# Patient Record
Sex: MALE | Race: WHITE | NOT HISPANIC OR LATINO | URBAN - METROPOLITAN AREA
[De-identification: names, ages, dates, MRNs, and addresses within clinical notes are randomized per-mention and may not be internally consistent; named-entity substitution may affect disease eponyms.]

---

## 2017-06-10 ENCOUNTER — EMERGENCY (EMERGENCY)
Facility: HOSPITAL | Age: 72
LOS: 1 days | Discharge: HOME | End: 2017-06-10
Attending: EMERGENCY MEDICINE | Admitting: EMERGENCY MEDICINE
Payer: MEDICARE

## 2017-06-10 VITALS
SYSTOLIC BLOOD PRESSURE: 149 MMHG | TEMPERATURE: 97 F | DIASTOLIC BLOOD PRESSURE: 91 MMHG | HEART RATE: 94 BPM | RESPIRATION RATE: 19 BRPM

## 2017-06-10 VITALS
HEART RATE: 85 BPM | TEMPERATURE: 97 F | SYSTOLIC BLOOD PRESSURE: 157 MMHG | OXYGEN SATURATION: 100 % | RESPIRATION RATE: 18 BRPM | DIASTOLIC BLOOD PRESSURE: 101 MMHG

## 2017-06-10 DIAGNOSIS — F39 UNSPECIFIED MOOD [AFFECTIVE] DISORDER: ICD-10-CM

## 2017-06-10 DIAGNOSIS — F32.9 MAJOR DEPRESSIVE DISORDER, SINGLE EPISODE, UNSPECIFIED: ICD-10-CM

## 2017-06-10 DIAGNOSIS — G47.00 INSOMNIA, UNSPECIFIED: ICD-10-CM

## 2017-06-10 DIAGNOSIS — F60.9 PERSONALITY DISORDER, UNSPECIFIED: ICD-10-CM

## 2017-06-10 DIAGNOSIS — F41.9 ANXIETY DISORDER, UNSPECIFIED: ICD-10-CM

## 2017-06-10 DIAGNOSIS — Z79.899 OTHER LONG TERM (CURRENT) DRUG THERAPY: ICD-10-CM

## 2017-06-10 DIAGNOSIS — Z91.048 OTHER NONMEDICINAL SUBSTANCE ALLERGY STATUS: ICD-10-CM

## 2017-06-10 LAB
ALBUMIN SERPL ELPH-MCNC: 4.5 G/DL — SIGNIFICANT CHANGE UP (ref 3.3–5)
ALP SERPL-CCNC: 67 U/L — SIGNIFICANT CHANGE UP (ref 40–120)
ALT FLD-CCNC: 22 U/L RC — SIGNIFICANT CHANGE UP (ref 10–45)
ANION GAP SERPL CALC-SCNC: 14 MMOL/L — SIGNIFICANT CHANGE UP (ref 5–17)
APAP SERPL-MCNC: <15 UG/ML — SIGNIFICANT CHANGE UP (ref 10–30)
AST SERPL-CCNC: 17 U/L — SIGNIFICANT CHANGE UP (ref 10–40)
BASOPHILS # BLD AUTO: 0 K/UL — SIGNIFICANT CHANGE UP (ref 0–0.2)
BASOPHILS NFR BLD AUTO: 0.2 % — SIGNIFICANT CHANGE UP (ref 0–2)
BILIRUB SERPL-MCNC: 1.4 MG/DL — HIGH (ref 0.2–1.2)
BUN SERPL-MCNC: 20 MG/DL — SIGNIFICANT CHANGE UP (ref 7–23)
CALCIUM SERPL-MCNC: 10.2 MG/DL — SIGNIFICANT CHANGE UP (ref 8.4–10.5)
CHLORIDE SERPL-SCNC: 103 MMOL/L — SIGNIFICANT CHANGE UP (ref 96–108)
CO2 SERPL-SCNC: 22 MMOL/L — SIGNIFICANT CHANGE UP (ref 22–31)
CREAT SERPL-MCNC: 1.08 MG/DL — SIGNIFICANT CHANGE UP (ref 0.5–1.3)
EOSINOPHIL # BLD AUTO: 0 K/UL — SIGNIFICANT CHANGE UP (ref 0–0.5)
EOSINOPHIL NFR BLD AUTO: 0.5 % — SIGNIFICANT CHANGE UP (ref 0–6)
GLUCOSE SERPL-MCNC: 85 MG/DL — SIGNIFICANT CHANGE UP (ref 70–99)
HCT VFR BLD CALC: 45.1 % — SIGNIFICANT CHANGE UP (ref 39–50)
HGB BLD-MCNC: 15.8 G/DL — SIGNIFICANT CHANGE UP (ref 13–17)
LYMPHOCYTES # BLD AUTO: 1.3 K/UL — SIGNIFICANT CHANGE UP (ref 1–3.3)
LYMPHOCYTES # BLD AUTO: 14.6 % — SIGNIFICANT CHANGE UP (ref 13–44)
MCHC RBC-ENTMCNC: 35 GM/DL — SIGNIFICANT CHANGE UP (ref 32–36)
MCHC RBC-ENTMCNC: 35.2 PG — HIGH (ref 27–34)
MCV RBC AUTO: 100 FL — SIGNIFICANT CHANGE UP (ref 80–100)
MONOCYTES # BLD AUTO: 0.7 K/UL — SIGNIFICANT CHANGE UP (ref 0–0.9)
MONOCYTES NFR BLD AUTO: 7.5 % — SIGNIFICANT CHANGE UP (ref 2–14)
NEUTROPHILS # BLD AUTO: 6.8 K/UL — SIGNIFICANT CHANGE UP (ref 1.8–7.4)
NEUTROPHILS NFR BLD AUTO: 77.2 % — HIGH (ref 43–77)
PLATELET # BLD AUTO: 267 K/UL — SIGNIFICANT CHANGE UP (ref 150–400)
POTASSIUM SERPL-MCNC: 4.1 MMOL/L — SIGNIFICANT CHANGE UP (ref 3.5–5.3)
POTASSIUM SERPL-SCNC: 4.1 MMOL/L — SIGNIFICANT CHANGE UP (ref 3.5–5.3)
PROT SERPL-MCNC: 7.7 G/DL — SIGNIFICANT CHANGE UP (ref 6–8.3)
RBC # BLD: 4.49 M/UL — SIGNIFICANT CHANGE UP (ref 4.2–5.8)
RBC # FLD: 12 % — SIGNIFICANT CHANGE UP (ref 10.3–14.5)
SALICYLATES SERPL-MCNC: <2 MG/DL — LOW (ref 15–30)
SODIUM SERPL-SCNC: 139 MMOL/L — SIGNIFICANT CHANGE UP (ref 135–145)
TSH SERPL-MCNC: 2.28 UIU/ML — SIGNIFICANT CHANGE UP (ref 0.27–4.2)
WBC # BLD: 8.8 K/UL — SIGNIFICANT CHANGE UP (ref 3.8–10.5)
WBC # FLD AUTO: 8.8 K/UL — SIGNIFICANT CHANGE UP (ref 3.8–10.5)

## 2017-06-10 PROCEDURE — 90792 PSYCH DIAG EVAL W/MED SRVCS: CPT

## 2017-06-10 PROCEDURE — 84443 ASSAY THYROID STIM HORMONE: CPT

## 2017-06-10 PROCEDURE — 99284 EMERGENCY DEPT VISIT MOD MDM: CPT | Mod: 25

## 2017-06-10 PROCEDURE — 93005 ELECTROCARDIOGRAM TRACING: CPT

## 2017-06-10 PROCEDURE — 80053 COMPREHEN METABOLIC PANEL: CPT

## 2017-06-10 PROCEDURE — 93010 ELECTROCARDIOGRAM REPORT: CPT

## 2017-06-10 PROCEDURE — 85027 COMPLETE CBC AUTOMATED: CPT

## 2017-06-10 PROCEDURE — 99284 EMERGENCY DEPT VISIT MOD MDM: CPT | Mod: 25,GC

## 2017-06-10 PROCEDURE — 80307 DRUG TEST PRSMV CHEM ANLYZR: CPT

## 2017-06-10 RX ADMIN — Medication 1 MILLIGRAM(S): at 14:48

## 2017-06-10 NOTE — ED PROVIDER NOTE - PHYSICAL EXAMINATION
Well Appearing, Nontoxic, NAD;  Symm Facies, PERRL 3mm, (-)Pallor, Anicteric, MMM;  No JVD/Bruits or stridor;  RRR w/o m/g/r;   CTAB w/o w/r/r;   Abd soft, nt/nd, +bs;  No CVAT;  No edema/calf tender;  No rash;  AOX3, Normal speech, normal strength/sensation/gait;  anxious/crying

## 2017-06-10 NOTE — ED BEHAVIORAL HEALTH ASSESSMENT NOTE - DESCRIPTION
prostate CA, melanoma lives alone, works from home, single and no kids slightly restless, offered ativan prn for anxiety

## 2017-06-10 NOTE — ED BEHAVIORAL HEALTH ASSESSMENT NOTE - DIFFERENTIAL
Mood disorder, Bipolar disorder, personality disorder with dependent, borderline and narcissistic traits

## 2017-06-10 NOTE — ED BEHAVIORAL HEALTH ASSESSMENT NOTE - OTHER PAST PSYCHIATRIC HISTORY (INCLUDE DETAILS REGARDING ONSET, COURSE OF ILLNESS, INPATIENT/OUTPATIENT TREATMENT)
multiple psych admissions, SA by OD 5 years ago, last admitted [facility] 1/17, received ect, was d/c on zyprexa, ativan, depakote

## 2017-06-10 NOTE — ED ADULT NURSE NOTE - OBJECTIVE STATEMENT
This pt is a 70 y/o male with history of psychiatric hospitalizations. Pt  complained that he is having a mental breakdown and denied feeling depressed, suicidal or homicidal. Pt was begging to be treated but was asking to leave at the same time. Pt stated he has not been seeing his therapist because "that last woman was crazy"(referring to therapist), Pt was loud and anxious upon his arrival to the ED.  Pt is currently on 1:1 for safety

## 2017-06-10 NOTE — ED PROVIDER NOTE - PLAN OF CARE
Follow up with PMD in 1-2 days.  Use tylenol as needed for pain. Tylenol 1000mg every 8 hours.    Read all handouts provided. Return to ED if you have worsening pain or further concerns.

## 2017-06-10 NOTE — ED BEHAVIORAL HEALTH ASSESSMENT NOTE - HPI (INCLUDE ILLNESS QUALITY, SEVERITY, DURATION, TIMING, CONTEXT, MODIFYING FACTORS, ASSOCIATED SIGNS AND SYMPTOMS)
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  Pt anxious during interview, yelling at times, perseverates on not being able to function, unable to give clear examples of how he is not able to function. Pt denies feeling depressed, no anhdonia, fair ADLs, fair sleep, poor appetite, no si/hi, no access to guns. Pt denies anxiety, panic attacks, no psychosis, bree. Pt is not in current psych tx, not on psych meds. Pt states he has difficult time focusing doing work from home, couldn't elaborate details. Pt demanding a pill to "take it all away". Pt repeatedly stated he has been "suffering from mental illness for decades", believes no medication will help him. 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  Pt anxious during interview, yelling at times, perseverates on not being able to function, unable to give clear examples of how he is not able to function. Pt denies feeling depressed, no anhdonia, fair ADLs, fair sleep, poor appetite, no si/hi, no access to guns. Pt denies anxiety, panic attacks, no psychosis, bree. Pt is not in current psych tx, not on psych meds. Pt states he has difficult time focusing doing work from home, couldn't elaborate details. Pt demanding a pill to "take it all away". Pt repeatedly stated he has been "suffering from mental illness for decades", believes no medication will help him. Pt states he doesn't want to be admitted to psych, doesn't feel its necessary. Pt was planning to call a psychiatrist on Monday and make an appointment.

## 2017-06-10 NOTE — ED BEHAVIORAL HEALTH ASSESSMENT NOTE - RISK ASSESSMENT
Risk factors: impulsivity, noncompliance, h/o SA, h/o multiple admissions, poor social supports, poor therapeutic relationship     Protective factors: no guns, no current SI/HI    Pt is at an acutely elevated risk and requires admission

## 2017-06-10 NOTE — ED PROVIDER NOTE - ATTENDING CONTRIBUTION TO CARE
------------ATTENDING NOTE------------   72 yo M c/o increasing depressed mood, describing "nothing matters anymore" and "no one can help", crying in room, denies and headache or confusion or pain, no new medications/changes, no recent trauma, no drug or intoxicant use, awaiting labs/imaging and d/w Psych -->  - Manjinder King MD   ------------------------------------------------------------------------------------------- ------------ATTENDING NOTE------------   70 yo M c/o increasing depressed mood, describing "nothing matters anymore" and "no one can help", crying in room, denies and headache or confusion or pain, no new medications/changes, no recent trauma, no drug or intoxicant use, awaiting labs/imaging and d/w Psych --> evaluated by Psych and very well known to them, he is at his baseline, pt asking only for dose oral benzo and now feels safe/appropriate for d/c, cleared by albert and they feel he is himself for d/c, pt understands ddx, tx, colin, alexia.  - Manjinder King MD   -------------------------------------------------------------------------------------------

## 2017-06-10 NOTE — ED PROVIDER NOTE - CARE PLAN
Principal Discharge DX:	Depression  Secondary Diagnosis:	Melancholia Principal Discharge DX:	Depression  Instructions for follow-up, activity and diet:	Follow up with PMD in 1-2 days.  Use tylenol as needed for pain. Tylenol 1000mg every 8 hours.    Read all handouts provided. Return to ED if you have worsening pain or further concerns.  Secondary Diagnosis:	Melancholia

## 2017-06-10 NOTE — ED BEHAVIORAL HEALTH ASSESSMENT NOTE - SUMMARY
71M single, domiciled, retired with PPhx Bipolar disorder, personality disorder with dependent and narcissistic traits, multiple ED visits and psychiatric admissions in the past, last one in July 2016, 1 SA by OD 5 years ago, noncompliant with outpatient care, no h/o legal/arrests/violence, no active substance abuse, no PMH, self-referred to ER c/o inability to function, pt had fair ADLS, denies mood symptoms, no si/h, appears anxious, med seeking, attention seeking behavior, received ativan in ER, 71M single, domiciled, retired with PPhx Bipolar disorder, personality disorder with dependent and narcissistic traits, multiple ED visits and psychiatric admissions in the past, last one in July 2016, 1 SA by OD 5 years ago, noncompliant with outpatient care, no h/o legal/arrests/violence, no active substance abuse, no PMH, self-referred to ER c/o inability to function, pt had fair ADLS, denies mood symptoms, no si/h, appears anxious, med seeking, attention seeking behavior, received ativan in ER, reported feeling some relief after Ativan, less anxious, wants to be d/c home with outpt f/u, pt doesn't warrant psych admission.

## 2017-06-11 ENCOUNTER — EMERGENCY (EMERGENCY)
Facility: HOSPITAL | Age: 72
LOS: 1 days | Discharge: HOME | End: 2017-06-11
Attending: EMERGENCY MEDICINE | Admitting: EMERGENCY MEDICINE
Payer: MEDICARE

## 2017-06-11 VITALS
RESPIRATION RATE: 19 BRPM | DIASTOLIC BLOOD PRESSURE: 88 MMHG | TEMPERATURE: 98 F | OXYGEN SATURATION: 99 % | HEART RATE: 82 BPM | SYSTOLIC BLOOD PRESSURE: 143 MMHG

## 2017-06-11 DIAGNOSIS — Z91.048 OTHER NONMEDICINAL SUBSTANCE ALLERGY STATUS: ICD-10-CM

## 2017-06-11 DIAGNOSIS — F41.9 ANXIETY DISORDER, UNSPECIFIED: ICD-10-CM

## 2017-06-11 DIAGNOSIS — R45.1 RESTLESSNESS AND AGITATION: ICD-10-CM

## 2017-06-11 DIAGNOSIS — Z79.899 OTHER LONG TERM (CURRENT) DRUG THERAPY: ICD-10-CM

## 2017-06-11 LAB
ALBUMIN SERPL ELPH-MCNC: 4.2 G/DL — SIGNIFICANT CHANGE UP (ref 3.3–5)
ALP SERPL-CCNC: 61 U/L — SIGNIFICANT CHANGE UP (ref 40–120)
ALT FLD-CCNC: 22 U/L RC — SIGNIFICANT CHANGE UP (ref 10–45)
ANION GAP SERPL CALC-SCNC: 14 MMOL/L — SIGNIFICANT CHANGE UP (ref 5–17)
APAP SERPL-MCNC: <15 UG/ML — SIGNIFICANT CHANGE UP (ref 10–30)
APPEARANCE UR: CLEAR — SIGNIFICANT CHANGE UP
AST SERPL-CCNC: 19 U/L — SIGNIFICANT CHANGE UP (ref 10–40)
BASOPHILS # BLD AUTO: 0.1 K/UL — SIGNIFICANT CHANGE UP (ref 0–0.2)
BASOPHILS NFR BLD AUTO: 0.9 % — SIGNIFICANT CHANGE UP (ref 0–2)
BILIRUB SERPL-MCNC: 1.1 MG/DL — SIGNIFICANT CHANGE UP (ref 0.2–1.2)
BILIRUB UR-MCNC: NEGATIVE — SIGNIFICANT CHANGE UP
BUN SERPL-MCNC: 22 MG/DL — SIGNIFICANT CHANGE UP (ref 7–23)
CALCIUM SERPL-MCNC: 9.8 MG/DL — SIGNIFICANT CHANGE UP (ref 8.4–10.5)
CHLORIDE SERPL-SCNC: 107 MMOL/L — SIGNIFICANT CHANGE UP (ref 96–108)
CO2 SERPL-SCNC: 23 MMOL/L — SIGNIFICANT CHANGE UP (ref 22–31)
COLOR SPEC: YELLOW — SIGNIFICANT CHANGE UP
CREAT SERPL-MCNC: 1.14 MG/DL — SIGNIFICANT CHANGE UP (ref 0.5–1.3)
DIFF PNL FLD: NEGATIVE — SIGNIFICANT CHANGE UP
EOSINOPHIL # BLD AUTO: 0.1 K/UL — SIGNIFICANT CHANGE UP (ref 0–0.5)
EOSINOPHIL NFR BLD AUTO: 1.2 % — SIGNIFICANT CHANGE UP (ref 0–6)
ETHANOL SERPL-MCNC: SIGNIFICANT CHANGE UP MG/DL (ref 0–10)
GLUCOSE SERPL-MCNC: 83 MG/DL — SIGNIFICANT CHANGE UP (ref 70–99)
GLUCOSE UR QL: NEGATIVE — SIGNIFICANT CHANGE UP
HCT VFR BLD CALC: 42.6 % — SIGNIFICANT CHANGE UP (ref 39–50)
HGB BLD-MCNC: 14.8 G/DL — SIGNIFICANT CHANGE UP (ref 13–17)
KETONES UR-MCNC: NEGATIVE — SIGNIFICANT CHANGE UP
LEUKOCYTE ESTERASE UR-ACNC: NEGATIVE — SIGNIFICANT CHANGE UP
LYMPHOCYTES # BLD AUTO: 1.8 K/UL — SIGNIFICANT CHANGE UP (ref 1–3.3)
LYMPHOCYTES # BLD AUTO: 20.3 % — SIGNIFICANT CHANGE UP (ref 13–44)
MCHC RBC-ENTMCNC: 34.7 GM/DL — SIGNIFICANT CHANGE UP (ref 32–36)
MCHC RBC-ENTMCNC: 35.1 PG — HIGH (ref 27–34)
MCV RBC AUTO: 101 FL — HIGH (ref 80–100)
MONOCYTES # BLD AUTO: 0.9 K/UL — SIGNIFICANT CHANGE UP (ref 0–0.9)
MONOCYTES NFR BLD AUTO: 9.8 % — SIGNIFICANT CHANGE UP (ref 2–14)
NEUTROPHILS # BLD AUTO: 6.2 K/UL — SIGNIFICANT CHANGE UP (ref 1.8–7.4)
NEUTROPHILS NFR BLD AUTO: 67.8 % — SIGNIFICANT CHANGE UP (ref 43–77)
NITRITE UR-MCNC: NEGATIVE — SIGNIFICANT CHANGE UP
PCP SPEC-MCNC: SIGNIFICANT CHANGE UP
PH UR: 6.5 — SIGNIFICANT CHANGE UP (ref 5–8)
PLATELET # BLD AUTO: 236 K/UL — SIGNIFICANT CHANGE UP (ref 150–400)
POTASSIUM SERPL-MCNC: 4.1 MMOL/L — SIGNIFICANT CHANGE UP (ref 3.5–5.3)
POTASSIUM SERPL-SCNC: 4.1 MMOL/L — SIGNIFICANT CHANGE UP (ref 3.5–5.3)
PROT SERPL-MCNC: 7.3 G/DL — SIGNIFICANT CHANGE UP (ref 6–8.3)
PROT UR-MCNC: SIGNIFICANT CHANGE UP
RBC # BLD: 4.22 M/UL — SIGNIFICANT CHANGE UP (ref 4.2–5.8)
RBC # FLD: 12.1 % — SIGNIFICANT CHANGE UP (ref 10.3–14.5)
SALICYLATES SERPL-MCNC: <2 MG/DL — LOW (ref 15–30)
SODIUM SERPL-SCNC: 144 MMOL/L — SIGNIFICANT CHANGE UP (ref 135–145)
SP GR SPEC: 1.03 — HIGH (ref 1.01–1.02)
TSH SERPL-MCNC: 2.57 UIU/ML — SIGNIFICANT CHANGE UP (ref 0.27–4.2)
UROBILINOGEN FLD QL: NEGATIVE — SIGNIFICANT CHANGE UP
WBC # BLD: 9.1 K/UL — SIGNIFICANT CHANGE UP (ref 3.8–10.5)
WBC # FLD AUTO: 9.1 K/UL — SIGNIFICANT CHANGE UP (ref 3.8–10.5)

## 2017-06-11 PROCEDURE — 99284 EMERGENCY DEPT VISIT MOD MDM: CPT | Mod: 25,GC

## 2017-06-11 PROCEDURE — 93010 ELECTROCARDIOGRAM REPORT: CPT

## 2017-06-11 PROCEDURE — 99285 EMERGENCY DEPT VISIT HI MDM: CPT

## 2017-06-11 RX ORDER — FOLIC ACID 0.8 MG
1 TABLET ORAL ONCE
Qty: 0 | Refills: 0 | Status: COMPLETED | OUTPATIENT
Start: 2017-06-11 | End: 2017-06-11

## 2017-06-11 RX ORDER — HYDROXYZINE HCL 10 MG
50 TABLET ORAL ONCE
Qty: 0 | Refills: 0 | Status: COMPLETED | OUTPATIENT
Start: 2017-06-11 | End: 2017-06-11

## 2017-06-11 RX ORDER — THIAMINE MONONITRATE (VIT B1) 100 MG
100 TABLET ORAL ONCE
Qty: 0 | Refills: 0 | Status: COMPLETED | OUTPATIENT
Start: 2017-06-11 | End: 2017-06-11

## 2017-06-11 RX ADMIN — Medication 50 MILLIGRAM(S): at 21:04

## 2017-06-11 RX ADMIN — Medication 2 MILLIGRAM(S): at 11:58

## 2017-06-11 NOTE — ED PROVIDER NOTE - OBJECTIVE STATEMENT
71 year old male female chart review noted to have dx of bipolar, pt states "I cannot function" but cannot describe how. States that he wants to go to [facility] and get ECT. Denies si / hi. Is able to perform ADLs but states "I want to be able to go to work" and states that he works with [employer] and [employer]. Was attempting to drive to [facility] but came here instead, seen yesterday but declined admission at that time. no cough / sneeze / urinary symptoms. states he feels well. Had been planning to see psych outpt on monday as discussed yesterday. denies alcohol use.

## 2017-06-11 NOTE — ED PROVIDER NOTE - PLAN OF CARE
1. Take Ativan as prescribed for anxiety  2. Please follow-up with psychiatry within the next 2-3 days, please refer to numbers given to you by psychiatry doctor  3. Return immediately for any worsening or concerning symptoms including any thoughts of wanting to hurt yourself

## 2017-06-11 NOTE — ED BEHAVIORAL HEALTH ASSESSMENT NOTE - OTHER PAST PSYCHIATRIC HISTORY (INCLUDE DETAILS REGARDING ONSET, COURSE OF ILLNESS, INPATIENT/OUTPATIENT TREATMENT)
multiple psych admissions, SA by OD 5 years ago, last admitted [facility] 1/17, received ect, was d/c on zyprexa, ativan, depakote

## 2017-06-11 NOTE — ED ADULT NURSE NOTE - CHPI ED SYMPTOMS NEG
no change in level of consciousness/no disorientation/no hallucinations/no homicidal/no confusion/no suicidal

## 2017-06-11 NOTE — ED PROVIDER NOTE - ATTENDING CONTRIBUTION TO CARE
------------ATTENDING NOTE------------   70 yo M returning to ED c/o increased anxiety and feeling unwell, evaluated for same yesterday,   - Manjinder King MD   ----------------------------------------------------- ------------ATTENDING NOTE------------   72 yo M returning to ED c/o increased anxiety and feeling unwell, evaluated for same yesterday, eval by Psych, transfer for inpt psych, no new/additional complaints/concerns.  - Manjinder King MD   -----------------------------------------------------

## 2017-06-11 NOTE — ED PROVIDER NOTE - NS ED ROS FT
ROS: GENERAL: no fevers, no chills EYE: no visual changes ENT: no epistaxis,  no throat pain CHEST: no pain with breathing,  no hemoptysis CARDIAC: no chest pain, no upper back pain GI: no abdominal pain, no hematemesis, no bright red blood per rectum : no dysuria, no hematuria MSK: no arm pain, no leg pain, no back pain SKIN: no rash NEURO: no headache, no neck pain HEME: no easy bruising, no easy bleeding -jesus

## 2017-06-11 NOTE — ED BEHAVIORAL HEALTH ASSESSMENT NOTE - HPI (INCLUDE ILLNESS QUALITY, SEVERITY, DURATION, TIMING, CONTEXT, MODIFYING FACTORS, ASSOCIATED SIGNS AND SYMPTOMS)
71M single, domiciled, retired with PPhx Bipolar disorder, personality disorder with dependent and narcissistic traits, multiple ED visits and psychiatric admissions in the past, last one in 1/17 SA rain by STARR 5 years ago, noncompliant with outpatient care, no h/o legal/arrests/violence, no active substance abuse, no PMH, self-referred to ER c/o inability to function,. pt was seen yesterday by writer as well for similar complaints. Pt returns today stating he cant wait til monday to call a psychiatrist and set up appointment. Pt requesting to be admitted to psych, "cant function", extremely anxious, couldn't sleep, no active si currently.   Pt anxious during interview, yelling at times, perseverates on not being able to function, unable to give clear examples of how he is not able to function. Pt denies feeling depressed, no anhdonia, fair ADLs, fair sleep, poor appetite, no si/hi, no access to guns. Pt denies psychosis, bree. Pt is not in current psych tx, not on psych meds. Pt states he has difficult time focusing doing work from home, couldn't elaborate details. Pt demanding a pill to "take it all away". Pt repeatedly stated he has been "suffering from mental illness for decades", believes no medication will help him.

## 2017-06-11 NOTE — ED PROVIDER NOTE - PROGRESS NOTE DETAILS
d/w psych will see pt -[facility] to transfer. d/w silas. still looking for bed -jesus Michael Daniel DO: Received sign out for patient awaiting transfer. Will start ativan 1mg PO BID as per psych recs. to be signed out to night attending, awaiting transfer. Attending MD Negrete: patient received in sign out from Dr. Michael, patient was awaiting transfer for voluntary admission to [facility] for severe anxiety. Patient now wishes to go home, denies SI or HI at this time. Psychiatry paged to review case to ensure patient has good follow up plan Attending MD Negrete: psych paged x3, no response yet. pt re-assessed by psychiatry. deneis SI/HI, no apparent risk to self, wishes to be d/c'd home and f/u with psychiatrist as outpatient. pt given multiple referral numbers for follow-up by psychiatry. will d/c with two day supply of ativan. pt stable for d/c home. - Jayant Lanier MD

## 2017-06-11 NOTE — ED ADULT NURSE NOTE - OBJECTIVE STATEMENT
Pt is a   y/o male who recently visited ER yesterday for the same complaint stating "I cant take it anymore" but not endorsing suicidal or homicidal thoughts. Pt was fairly dressed   and appropriate during interview but loudly emphasizes his diificulty coping with issues. Pt is very labile but redirectable. Pt is currently not on 1:1 observation, Pt is now seeking inpatient psychiatric hospitalization. Pt complied with security admission procedures and

## 2017-06-11 NOTE — ED BEHAVIORAL HEALTH ASSESSMENT NOTE - SUMMARY
71M single, domiciled, retired with PPhx Bipolar disorder, personality disorder with dependent and narcissistic traits, multiple ED visits and psychiatric admissions in the past, last one in July 2016, 1 SA by OD 5 years ago, noncompliant with outpatient care, no h/o legal/arrests/violence, no active substance abuse, no PMH, self-referred to ER c/o inability to function, pt was seen yesterday in [facility] ER for same complaints, today pt stating he has worsening anxiety, cant function, needs immediate treatment, no si/hi, cant wait til monday to make appointment, pt requesting psych admission, pt will be admitted to psych for mood stabilization, severe anxiety.

## 2017-06-11 NOTE — ED ADULT NURSE REASSESSMENT NOTE - NS ED NURSE REASSESS COMMENT FT1
patient refused to take folic acid and thiamine. Psychiatrist on call  [place] was informed. patient was anxious and irritable requested medication to feel better. Atarax 50mg po given at 9:07pm as per psychiatrist Dr. Mcmillan. Emotional support provided and encouraged to relax and practice deep breathing exercise. Will monitor effect. Behavior is in control.

## 2017-06-12 VITALS
SYSTOLIC BLOOD PRESSURE: 125 MMHG | RESPIRATION RATE: 18 BRPM | DIASTOLIC BLOOD PRESSURE: 75 MMHG | OXYGEN SATURATION: 98 % | HEART RATE: 68 BPM | TEMPERATURE: 98 F

## 2017-06-12 PROCEDURE — 80307 DRUG TEST PRSMV CHEM ANLYZR: CPT

## 2017-06-12 PROCEDURE — 80053 COMPREHEN METABOLIC PANEL: CPT

## 2017-06-12 PROCEDURE — 99285 EMERGENCY DEPT VISIT HI MDM: CPT | Mod: 25

## 2017-06-12 PROCEDURE — 85027 COMPLETE CBC AUTOMATED: CPT

## 2017-06-12 PROCEDURE — 84443 ASSAY THYROID STIM HORMONE: CPT

## 2017-06-12 PROCEDURE — 93005 ELECTROCARDIOGRAM TRACING: CPT

## 2017-06-12 PROCEDURE — 81003 URINALYSIS AUTO W/O SCOPE: CPT

## 2017-06-12 RX ORDER — TRAZODONE HCL 50 MG
50 TABLET ORAL ONCE
Qty: 0 | Refills: 0 | Status: COMPLETED | OUTPATIENT
Start: 2017-06-12 | End: 2017-06-12

## 2017-06-12 RX ADMIN — Medication 50 MILLIGRAM(S): at 01:37

## 2017-06-12 RX ADMIN — Medication 1 MILLIGRAM(S): at 01:23

## 2017-06-12 NOTE — ED ADULT NURSE REASSESSMENT NOTE - NS ED NURSE REASSESS COMMENT FT1
Patient is calm and cooperative. Behavior is in control. Had sandwiches and drank apple juice and ginger ale x 2. Affect bright. Awaiting for transfer to [facility] when bed available am

## 2017-06-12 NOTE — ED BEHAVIORAL HEALTH NOTE - BEHAVIORAL HEALTH NOTE
Pt seen and examined, chart reviewed, Pt states he "feels much better", doesn't want to be admitted to psych unit. Pt wants to be discharged home, and find his own psychiatrist for therapy/med mgt. Pt denies si/hi, is more comfortable, less anxious.   MSE: pt appears stated age, calm, cooperative, speech is loud volume at times, slightly rapid pressured, normal tone. mood is "Ok', affect is constricted, anxious at times. TP: tangential, overinclusive, denies si/hi/i/p, no delusions, preoccupied with getting an appointment with psychiatrist, denies hallucinations. judgment and insight is fair, impulse control is fair. Vitals wnl.   A/P: Pt is a 72y/o male, mult psych admissions, hx depression, anxiety, personality d/o, bib self for inablity to function, anxiety, pt denies si/hi, Pt can be discharged home, given list of referrals. can give ativan 1mg po q6hr prn anxiety 3 day supply. discussed with ER team, silas Bravo, aware of plan.

## 2017-06-12 NOTE — ED BEHAVIORAL HEALTH NOTE - BEHAVIORAL HEALTH NOTE
Pt  reports that he feels "much better " and "wants to go home"..pt is hyperverbal..but in control ..Pt stating that he "doesn't want to go to [facility] for ECT because it made (him) lose his memory the last time (he) had it"..Will monitor.

## 2017-06-12 NOTE — ED ADULT NURSE REASSESSMENT NOTE - NS ED NURSE REASSESS COMMENT FT1
patient intermittently loud, irritable and requested medication to calm and sleep. Ativan 1mg po given at 1:25am and trazodone 50mg po at 1:37am. V/S are stable. Resting in bed currently. Will monitor effect. Awaiting transfer to [facility] when bed available in am

## 2017-06-16 ENCOUNTER — EMERGENCY (EMERGENCY)
Facility: HOSPITAL | Age: 72
LOS: 1 days | Discharge: HOME | End: 2017-06-16
Admitting: EMERGENCY MEDICINE
Payer: MEDICARE

## 2017-06-16 VITALS
DIASTOLIC BLOOD PRESSURE: 100 MMHG | OXYGEN SATURATION: 100 % | HEART RATE: 75 BPM | TEMPERATURE: 98 F | RESPIRATION RATE: 18 BRPM | SYSTOLIC BLOOD PRESSURE: 145 MMHG

## 2017-06-16 DIAGNOSIS — F60.9 PERSONALITY DISORDER, UNSPECIFIED: ICD-10-CM

## 2017-06-16 DIAGNOSIS — F32.9 MAJOR DEPRESSIVE DISORDER, SINGLE EPISODE, UNSPECIFIED: ICD-10-CM

## 2017-06-16 PROCEDURE — 99284 EMERGENCY DEPT VISIT MOD MDM: CPT | Mod: 25

## 2017-06-16 PROCEDURE — 90792 PSYCH DIAG EVAL W/MED SRVCS: CPT

## 2017-06-16 PROCEDURE — 93010 ELECTROCARDIOGRAM REPORT: CPT

## 2017-06-16 RX ORDER — HALOPERIDOL DECANOATE 100 MG/ML
5 INJECTION INTRAMUSCULAR ONCE
Qty: 0 | Refills: 0 | Status: COMPLETED | OUTPATIENT
Start: 2017-06-16 | End: 2017-06-16

## 2017-06-16 RX ADMIN — Medication 2 MILLIGRAM(S): at 17:19

## 2017-06-16 RX ADMIN — HALOPERIDOL DECANOATE 5 MILLIGRAM(S): 100 INJECTION INTRAMUSCULAR at 17:19

## 2017-06-16 NOTE — ED BEHAVIORAL HEALTH ASSESSMENT NOTE - CASE SUMMARY
71 year old male with personality disorder with narcissistic, histrionic, and dependent traits, multiple prior psychiatric admissions including ECT treatment, 1 prior SA by OD 5 years ago, presenting stating he is unable to function. He endorses anxiety and depressive symptoms, including passive SI since yesterday. He denies active SI/HI/I/P, denies manic or psychotic symptoms and can care for himself. He does not present an acute danger to self or others. He declines voluntary admission, and he does not meet criteria for involuntary psychiatric hospitalization at this time. Plan as above.

## 2017-06-16 NOTE — ED BEHAVIORAL HEALTH NOTE - BEHAVIORAL HEALTH NOTE
Writer was asked to speak with the patient regarding an urgent referral, which the patient refused. Writer informed the evaluating clinician of same, and with his approval, provided listings and psychoeducation on [employer]. The patient mentioned before leaving, however, that he has the name of a different psychiatrist he plans to see.

## 2017-06-16 NOTE — ED BEHAVIORAL HEALTH ASSESSMENT NOTE - HPI (INCLUDE ILLNESS QUALITY, SEVERITY, DURATION, TIMING, CONTEXT, MODIFYING FACTORS, ASSOCIATED SIGNS AND SYMPTOMS)
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 Patient was seen twice over the past weekend at [facility] ED for the same complaints.. Pt returns today stating that he felt better Monday when he was discharged from the ED, but Wednesday, when he went to [place], he forgot to take his transistor radio. He says that he got lonely. He says that, Thursday, he became extremely anxious, anhedonic, spent all day in bed, had decreased appetite, anergia, difficulty concentrating, and insomnia. He also developed hopelessness and passive SI. He talks about how he has been suffering from this since he was 20 when he left college, which he still regrets. He says that no one knows what is wrong with him and asks writer if he has ever seen anything like what he has. He says that he is not seeing a psychiatrist or on medication, although he was given some medication (Ativan) when he was discharged from the ED on Monday. He says that he wanted to see a new psychiatrist today, but did not contact the psychiatrist because he was feeling too poor today. He is anxious during the interview, yelling at times, trying to grab interviewer's hand for consolation, perseverating on not being able to function, unable to give clear examples of how he is not able to function or what he is experiencing. He says that he is still showering, cooking, and caring for himself. He denies psychosis or bree currently or in the past. 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 Patient was seen twice over the past weekend at [facility] ED for the same complaints.. Pt returns today stating that he felt better Monday when he was discharged from the ED, but Wednesday, when he went to [place], he forgot to take his transistor radio. He says that he got lonely. He says that, Thursday, he became extremely anxious, anhedonic, spent all day in bed, had decreased appetite, anergia, difficulty concentrating, and insomnia. He also developed hopelessness and passive SI. He denies active SI, intent, or plan. He talks about how he has been suffering from this since he was 20 when he left college, which he still regrets. He says that no one knows what is wrong with him and asks writer if he has ever seen anything like what he has. He says that he is not seeing a psychiatrist or on medication, although he was given some medication (Ativan) when he was discharged from the ED on Monday. He says that he wanted to see a new psychiatrist today, but did not contact the psychiatrist because he was feeling too poor today. He is anxious during the interview, yelling at times, trying to grab interviewer's hand for consolation, perseverating on not being able to function, unable to give clear examples of how he is not able to function or what he is experiencing. He says that he is still showering, cooking, and caring for himself. He denies psychosis or bree currently or in the past.

## 2017-06-16 NOTE — ED PROVIDER NOTE - MEDICAL DECISION MAKING DETAILS
70 y/o M hx BPH, Depression, HTN  No evidence of physical injuries, broken skin or deformities.  Medical evaluation performed. There is no clinical evidence of intoxication or any acute medical problem requiring immediate intervention. Patient is awaiting psychiatric consultation. Final disposition will be determined by psychiatrist.

## 2017-06-16 NOTE — ED PROVIDER NOTE - PMH
BPH (Benign Prostatic Hypertrophy)    Depression    Environmental Allergies    HTN (hypertension)    Insomnia

## 2017-06-16 NOTE — ED PROVIDER NOTE - OBJECTIVE STATEMENT
70 y/o M hx BPH, Depression, HTN  presents to ER  w c/o " not feeling mentally well". States that he lives alone, bored and frequently suffers from insomnia.   Denies dizziness,  falling , punching or kicking any objects. Denies pain, SOB, fever, chills, chest/ abdominal discomfort. Denies SI/HI/AH/VH. Denies use of alcohol or  illicit drugs.

## 2017-06-16 NOTE — ED BEHAVIORAL HEALTH ASSESSMENT NOTE - OTHER PAST PSYCHIATRIC HISTORY (INCLUDE DETAILS REGARDING ONSET, COURSE OF ILLNESS, INPATIENT/OUTPATIENT TREATMENT)
multiple psych admissions, SA by OD 5 years ago, last admitted [facility] 1/17, received ect, was d/c on zyprexa, ativan, depakote

## 2017-06-16 NOTE — ED BEHAVIORAL HEALTH ASSESSMENT NOTE - SUMMARY
71 year old male with personality disorder with narcissistic, histrionic, and dependent traits, multiple prior psychiatric admission including ECT treatment, 1 prior SA by OD 5 years ago, presenting because he is unable to function. He endorses anxiety and depressive symptoms, including passive SI since yesterday. He denies active SI/HI/I/P or psychotic symptoms and can care for himself. He is not an acute risk to himself or anyone else and can be discharged to the outpatient level of care. 71 year old male with personality disorder with narcissistic, histrionic, and dependent traits, multiple prior psychiatric admissions including ECT treatment, 1 prior SA by OD 5 years ago, presenting stating he is unable to function. He endorses anxiety and depressive symptoms, including passive SI since yesterday. He denies active SI/HI/I/P, denies manic or psychotic symptoms and can care for himself. He is not an acute risk to himself or anyone else and can be discharged to the outpatient level of care.

## 2017-06-16 NOTE — ED BEHAVIORAL HEALTH ASSESSMENT NOTE - RISK ASSESSMENT
Patient has chronic risk factors, including a history of mood disorder, personality disorder, severe anxiety, multiple prior inpatient psychiatric hospitalizations, a prior suicide attempt. He has acute risk factors, including current depressive episode, passive SI, severe anxiety. He has protective factors, including seeking treatment, engagement in work, and future orientation. He does not have SI/HI/I/P and is not an acute risk of suicide or homicide. Patient can care for himself. Patient has chronic risk factors, including a history of mood disorder, personality disorder, severe anxiety, multiple prior inpatient psychiatric hospitalizations, a prior suicide attempt. He has acute risk factors, including current depressive episode, passive SI, severe anxiety. He has protective factors, including seeking treatment, engagement in work, and future orientation. He does not have active SI/HI/I/P and is not an acute risk of suicide or homicide. Patient can care for himself.

## 2017-06-16 NOTE — ED BEHAVIORAL HEALTH ASSESSMENT NOTE - REFERRAL / APPOINTMENT DETAILS
Patient to have  referral. Patient given information about [facility]. Patient agrees to set up his own appointment.

## 2017-07-10 ENCOUNTER — EMERGENCY (EMERGENCY)
Facility: HOSPITAL | Age: 72
LOS: 1 days | Discharge: PSYCHIATRIC OR SUBSTANCE USE FACILITY | End: 2017-07-10
Attending: EMERGENCY MEDICINE | Admitting: EMERGENCY MEDICINE
Payer: MEDICARE

## 2017-07-10 ENCOUNTER — INPATIENT (INPATIENT)
Facility: HOSPITAL | Age: 72
LOS: 17 days | Discharge: HOME | End: 2017-07-28
Attending: PSYCHIATRY & NEUROLOGY | Admitting: PSYCHIATRY & NEUROLOGY
Payer: MEDICARE

## 2017-07-10 VITALS — HEIGHT: 71 IN | RESPIRATION RATE: 18 BRPM | TEMPERATURE: 99 F | WEIGHT: 169.98 LBS

## 2017-07-10 VITALS
SYSTOLIC BLOOD PRESSURE: 144 MMHG | DIASTOLIC BLOOD PRESSURE: 88 MMHG | OXYGEN SATURATION: 99 % | TEMPERATURE: 98 F | HEART RATE: 86 BPM | RESPIRATION RATE: 17 BRPM

## 2017-07-10 VITALS
DIASTOLIC BLOOD PRESSURE: 66 MMHG | TEMPERATURE: 98 F | RESPIRATION RATE: 18 BRPM | OXYGEN SATURATION: 99 % | HEART RATE: 86 BPM | SYSTOLIC BLOOD PRESSURE: 132 MMHG

## 2017-07-10 DIAGNOSIS — F41.9 ANXIETY DISORDER, UNSPECIFIED: ICD-10-CM

## 2017-07-10 DIAGNOSIS — F31.9 BIPOLAR DISORDER, UNSPECIFIED: ICD-10-CM

## 2017-07-10 DIAGNOSIS — F60.9 PERSONALITY DISORDER, UNSPECIFIED: ICD-10-CM

## 2017-07-10 LAB
ANION GAP SERPL CALC-SCNC: 14 MMOL/L — SIGNIFICANT CHANGE UP (ref 5–17)
APAP SERPL-MCNC: <15 UG/ML — SIGNIFICANT CHANGE UP (ref 10–30)
BUN SERPL-MCNC: 17 MG/DL — SIGNIFICANT CHANGE UP (ref 7–23)
CALCIUM SERPL-MCNC: 10.2 MG/DL — SIGNIFICANT CHANGE UP (ref 8.4–10.5)
CHLORIDE SERPL-SCNC: 102 MMOL/L — SIGNIFICANT CHANGE UP (ref 96–108)
CO2 SERPL-SCNC: 22 MMOL/L — SIGNIFICANT CHANGE UP (ref 22–31)
CREAT SERPL-MCNC: 1.04 MG/DL — SIGNIFICANT CHANGE UP (ref 0.5–1.3)
ETHANOL SERPL-MCNC: SIGNIFICANT CHANGE UP MG/DL (ref 0–10)
GLUCOSE SERPL-MCNC: 99 MG/DL — SIGNIFICANT CHANGE UP (ref 70–99)
HCT VFR BLD CALC: 46.4 % — SIGNIFICANT CHANGE UP (ref 39–50)
HGB BLD-MCNC: 16.2 G/DL — SIGNIFICANT CHANGE UP (ref 13–17)
MCHC RBC-ENTMCNC: 35 GM/DL — SIGNIFICANT CHANGE UP (ref 32–36)
MCHC RBC-ENTMCNC: 35.1 PG — HIGH (ref 27–34)
MCV RBC AUTO: 100 FL — SIGNIFICANT CHANGE UP (ref 80–100)
PLATELET # BLD AUTO: 239 K/UL — SIGNIFICANT CHANGE UP (ref 150–400)
POTASSIUM SERPL-MCNC: 4.2 MMOL/L — SIGNIFICANT CHANGE UP (ref 3.5–5.3)
POTASSIUM SERPL-SCNC: 4.2 MMOL/L — SIGNIFICANT CHANGE UP (ref 3.5–5.3)
RBC # BLD: 4.63 M/UL — SIGNIFICANT CHANGE UP (ref 4.2–5.8)
RBC # FLD: 11.6 % — SIGNIFICANT CHANGE UP (ref 10.3–14.5)
SALICYLATES SERPL-MCNC: <2 MG/DL — LOW (ref 15–30)
SODIUM SERPL-SCNC: 138 MMOL/L — SIGNIFICANT CHANGE UP (ref 135–145)
TSH SERPL-MCNC: 2.01 UIU/ML — SIGNIFICANT CHANGE UP (ref 0.27–4.2)
WBC # BLD: 7.8 K/UL — SIGNIFICANT CHANGE UP (ref 3.8–10.5)
WBC # FLD AUTO: 7.8 K/UL — SIGNIFICANT CHANGE UP (ref 3.8–10.5)

## 2017-07-10 PROCEDURE — 85027 COMPLETE CBC AUTOMATED: CPT

## 2017-07-10 PROCEDURE — 99285 EMERGENCY DEPT VISIT HI MDM: CPT | Mod: 25,GC

## 2017-07-10 PROCEDURE — 84443 ASSAY THYROID STIM HORMONE: CPT

## 2017-07-10 PROCEDURE — 99285 EMERGENCY DEPT VISIT HI MDM: CPT

## 2017-07-10 PROCEDURE — 99285 EMERGENCY DEPT VISIT HI MDM: CPT | Mod: 25

## 2017-07-10 PROCEDURE — 99222 1ST HOSP IP/OBS MODERATE 55: CPT

## 2017-07-10 PROCEDURE — 80307 DRUG TEST PRSMV CHEM ANLYZR: CPT

## 2017-07-10 PROCEDURE — 93010 ELECTROCARDIOGRAM REPORT: CPT

## 2017-07-10 PROCEDURE — 93005 ELECTROCARDIOGRAM TRACING: CPT

## 2017-07-10 PROCEDURE — 80048 BASIC METABOLIC PNL TOTAL CA: CPT

## 2017-07-10 RX ORDER — QUETIAPINE FUMARATE 200 MG/1
25 TABLET, FILM COATED ORAL ONCE
Qty: 0 | Refills: 0 | Status: DISCONTINUED | OUTPATIENT
Start: 2017-07-10 | End: 2017-07-14

## 2017-07-10 RX ADMIN — Medication 1 MILLIGRAM(S): at 21:08

## 2017-07-10 NOTE — ED PROVIDER NOTE - ATTENDING CONTRIBUTION TO CARE
****ATTENDING**** 72yo male hx Bipolar disorder presents stating that "he does not feel well, he cannot sleep, and feels mentally unstable. States he is not able to control his thoughts and needs help." Denies SI/HI. Requesting shock treatments. Denies any recent illness, cough, URI, fever or chills. No cp, palp, sob. On exam, patient is anxious, screaming intermittently for help. PERRL, CTA B/L, +S1S2, Abd soft nd/nt +BS. Ext no swelling.   Patient placed in CO, Check Labs, EKG. Consult psych and closley monitor patient.

## 2017-07-10 NOTE — ED PROVIDER NOTE - PHYSICAL EXAMINATION
Gen: agitated, requires redirection, psychotic but responds to verbal commands, AOx3  Head: NCAT  HEENT: PERRL, +4 b/l, oral mucosa moist, normal conjunctiva, neck supple  Lung: CTAB, no respiratory distress  CV: rrr, no murmur, Normal perfusion  Abd: soft, NTND  MSK: No edema, no visible deformities  Neuro: No focal neurologic deficits, no rigidity, no clonus  Skin: No rash   Psych: anxious, hyperactive, poor eye contact, tearful

## 2017-07-10 NOTE — ED BEHAVIORAL HEALTH ASSESSMENT NOTE - HPI (INCLUDE ILLNESS QUALITY, SEVERITY, DURATION, TIMING, CONTEXT, MODIFYING FACTORS, ASSOCIATED SIGNS AND SYMPTOMS)
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 Pt was seen multiple times in the ED in the last month for similar complaints. Pt stated that he has been trying to get into treatment with OP providers and hasn't' been successful in getting an appt. Pt. stated that last time he was admitted he had ECT which helped him. Pt is c/o of being extremely anxious, cant sleep, no active si currently. Pt c/o decrease in ADLs. NO hi/i/p. no a/v/h. Pt has been calling psychiatric dept. multiple times in the last 2 weeks, asking for help and threatening to hurt self.   Pt anxious during interview, yelling at times, perseverates on not being able to function. no access to guns. Pt denies psychosis, bree. Pt is not in current psych tx, not on psych meds. Pt states he has difficult time focusing doing work from home.   Pt repeatedly stated he has been "suffering from mental illness for decades", believes no medication will help him.  at the end of the interview pt was agreeable to psychiatric admission.

## 2017-07-10 NOTE — ED ADULT NURSE NOTE - OBJECTIVE STATEMENT
Pt came in voluntarily complaining that he cannot function. Pt has a long psychiatric history and he stated that he wants to get better. Pt denied suicidal and homicidal ideation. Looking into inpatient admission. Pt is very labile and has tearful outbursts requiring redirection because he gets loud. He is evasive and loud at times. Pt is placed on 1:1 for safety. No acute physical compliants

## 2017-07-10 NOTE — ED BEHAVIORAL HEALTH ASSESSMENT NOTE - OTHER
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

## 2017-07-10 NOTE — ED BEHAVIORAL HEALTH ASSESSMENT NOTE - OTHER PAST PSYCHIATRIC HISTORY (INCLUDE DETAILS REGARDING ONSET, COURSE OF ILLNESS, INPATIENT/OUTPATIENT TREATMENT)
multiple psych admissions, SA by OD 5 years ago, last admitted [facility] 1/17, received ect, was d/c on zyprexa, ativan, depakote

## 2017-07-10 NOTE — ED PROVIDER NOTE - OBJECTIVE STATEMENT
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 no SI/HI. no hallucinations. +racing thoughts. getting worse in last week. no EtOH, no drugs. not on meds. requesting 'shock treatment'

## 2017-07-10 NOTE — ED ADULT NURSE NOTE - ASSIGNED BED LOCATION
[facility] 5/ Db [facility] 5 / Db [facility]

## 2017-07-10 NOTE — ED PROVIDER NOTE - MEDICAL DECISION MAKING DETAILS
patient manic/psychotic, responds to verbal commands, will hold off on meds, psych consult, EKG, labs, likely admission

## 2017-07-11 LAB
ALBUMIN SERPL ELPH-MCNC: 4.6 G/DL — SIGNIFICANT CHANGE UP (ref 3.3–5)
ALP SERPL-CCNC: 68 U/L — SIGNIFICANT CHANGE UP (ref 40–120)
ALT FLD-CCNC: 29 U/L — SIGNIFICANT CHANGE UP (ref 4–41)
AST SERPL-CCNC: 26 U/L — SIGNIFICANT CHANGE UP (ref 4–40)
BASOPHILS # BLD AUTO: 0.04 K/UL — SIGNIFICANT CHANGE UP (ref 0–0.2)
BASOPHILS NFR BLD AUTO: 0.5 % — SIGNIFICANT CHANGE UP (ref 0–2)
BILIRUB SERPL-MCNC: 1.5 MG/DL — HIGH (ref 0.2–1.2)
BUN SERPL-MCNC: 21 MG/DL — SIGNIFICANT CHANGE UP (ref 7–23)
CALCIUM SERPL-MCNC: 9.9 MG/DL — SIGNIFICANT CHANGE UP (ref 8.4–10.5)
CHLORIDE SERPL-SCNC: 103 MMOL/L — SIGNIFICANT CHANGE UP (ref 98–107)
CHOLEST SERPL-MCNC: 153 MG/DL — SIGNIFICANT CHANGE UP (ref 120–199)
CO2 SERPL-SCNC: 21 MMOL/L — LOW (ref 22–31)
CREAT SERPL-MCNC: 0.99 MG/DL — SIGNIFICANT CHANGE UP (ref 0.5–1.3)
EOSINOPHIL # BLD AUTO: 0.14 K/UL — SIGNIFICANT CHANGE UP (ref 0–0.5)
EOSINOPHIL NFR BLD AUTO: 1.8 % — SIGNIFICANT CHANGE UP (ref 0–6)
GLUCOSE SERPL-MCNC: 85 MG/DL — SIGNIFICANT CHANGE UP (ref 70–99)
HCT VFR BLD CALC: 47.5 % — SIGNIFICANT CHANGE UP (ref 39–50)
HDLC SERPL-MCNC: 54 MG/DL — SIGNIFICANT CHANGE UP (ref 35–55)
HGB BLD-MCNC: 16.7 G/DL — SIGNIFICANT CHANGE UP (ref 13–17)
IMM GRANULOCYTES # BLD AUTO: 0.02 # — SIGNIFICANT CHANGE UP
IMM GRANULOCYTES NFR BLD AUTO: 0.3 % — SIGNIFICANT CHANGE UP (ref 0–1.5)
LIPID PNL WITH DIRECT LDL SERPL: 90 MG/DL — SIGNIFICANT CHANGE UP
LYMPHOCYTES # BLD AUTO: 2.15 K/UL — SIGNIFICANT CHANGE UP (ref 1–3.3)
LYMPHOCYTES # BLD AUTO: 27.1 % — SIGNIFICANT CHANGE UP (ref 13–44)
MCHC RBC-ENTMCNC: 34.3 PG — HIGH (ref 27–34)
MCHC RBC-ENTMCNC: 35.2 % — SIGNIFICANT CHANGE UP (ref 32–36)
MCV RBC AUTO: 97.5 FL — SIGNIFICANT CHANGE UP (ref 80–100)
MONOCYTES # BLD AUTO: 0.8 K/UL — SIGNIFICANT CHANGE UP (ref 0–0.9)
MONOCYTES NFR BLD AUTO: 10.1 % — SIGNIFICANT CHANGE UP (ref 2–14)
NEUTROPHILS # BLD AUTO: 4.77 K/UL — SIGNIFICANT CHANGE UP (ref 1.8–7.4)
NEUTROPHILS NFR BLD AUTO: 60.2 % — SIGNIFICANT CHANGE UP (ref 43–77)
NRBC # FLD: 0 — SIGNIFICANT CHANGE UP
PLATELET # BLD AUTO: 229 K/UL — SIGNIFICANT CHANGE UP (ref 150–400)
PMV BLD: 10.5 FL — SIGNIFICANT CHANGE UP (ref 7–13)
POTASSIUM SERPL-MCNC: 4.3 MMOL/L — SIGNIFICANT CHANGE UP (ref 3.5–5.3)
POTASSIUM SERPL-SCNC: 4.3 MMOL/L — SIGNIFICANT CHANGE UP (ref 3.5–5.3)
PROT SERPL-MCNC: 7.5 G/DL — SIGNIFICANT CHANGE UP (ref 6–8.3)
RBC # BLD: 4.87 M/UL — SIGNIFICANT CHANGE UP (ref 4.2–5.8)
RBC # FLD: 13.1 % — SIGNIFICANT CHANGE UP (ref 10.3–14.5)
SODIUM SERPL-SCNC: 141 MMOL/L — SIGNIFICANT CHANGE UP (ref 135–145)
TRIGL SERPL-MCNC: 97 MG/DL — SIGNIFICANT CHANGE UP (ref 10–149)
TSH SERPL-MCNC: 3.69 UIU/ML — SIGNIFICANT CHANGE UP (ref 0.27–4.2)
VALPROATE SERPL-MCNC: < 3.2 UG/ML — LOW (ref 50–100)
WBC # BLD: 7.92 K/UL — SIGNIFICANT CHANGE UP (ref 3.8–10.5)
WBC # FLD AUTO: 7.92 K/UL — SIGNIFICANT CHANGE UP (ref 3.8–10.5)

## 2017-07-11 PROCEDURE — 99222 1ST HOSP IP/OBS MODERATE 55: CPT

## 2017-07-11 RX ADMIN — Medication 0.5 MILLIGRAM(S): at 09:12

## 2017-07-11 RX ADMIN — Medication 0.5 MILLIGRAM(S): at 13:11

## 2017-07-11 RX ADMIN — Medication 1 MILLIGRAM(S): at 16:34

## 2017-07-11 RX ADMIN — Medication 0.5 MILLIGRAM(S): at 21:20

## 2017-07-11 NOTE — CHART NOTE - NSCHARTNOTEFT_GEN_A_CORE
Patient Admitted from: [facility] ED     [facility] admitting diagnosis: Bipolar affective disorder    PAST MEDICAL & SURGICAL HISTORY:  HTN (hypertension)  BPH (Benign Prostatic Hypertrophy)  Environmental Allergies  Insomnia  Depression  S/P TURP (Transurethral Resection of Prostate)        Allergies    No Known Drug Allergies  POLLEN AND GRASS (Unknown)    Intolerances        Social History:     FAMILY HISTORY:  No pertinent family history in first degree relatives      MEDICATIONS  (STANDING):  LORazepam     Tablet 0.5 milliGRAM(s) Oral three times a day    MEDICATIONS  (PRN):  LORazepam     Tablet 1 milliGRAM(s) Oral every 6 hours PRN Agitation  LORazepam   Injectable 1 milliGRAM(s) IntraMuscular once PRN Agitation      Vital Signs Last 24 Hrs  T(C): 36.8 (07-11-17 @ 15:26), Max: 36.8 (07-11-17 @ 15:26)  HR: -- 74  BP: -- 102/67  RR: 18 (07-11-17 @ 20:42) (18 - 20)  SpO2: --  CAPILLARY BLOOD GLUCOSE        I&O's Summary      PHYSICAL EXAM:  GENERAL: NAD, well-developed  HEAD:  Atraumatic, Normocephalic  EYES: EOMI, PERRLA, conjunctiva and sclera clear  NECK: Supple, No JVD  CHEST/LUNG: Clear to auscultation bilaterally; No wheeze  HEART: Regular rate and rhythm; No murmurs, rubs, or gallops  ABDOMEN: Soft, Nontender, Nondistended; Bowel sounds present  EXTREMITIES:  2+ Peripheral Pulses, No clubbing, cyanosis, or edema  PSYCH: AAOx3  NEUROLOGY: non-focal  SKIN: No rashes or lesions    LABS:                        16.7   7.92  )-----------( 229      ( 11 Jul 2017 08:30 )             47.5     07-11    141  |  103  |  21  ----------------------------<  85  4.3   |  21<L>  |  0.99    Ca    9.9      11 Jul 2017 08:30    TPro  7.5  /  Alb  4.6  /  TBili  1.5<H>  /  DBili  x   /  AST  26  /  ALT  29  /  AlkPhos  68  07-11          RADIOLOGY & ADDITIONAL TESTS:    EKG ordered     Assessment and Plan: 71 yr. old male with a PMH of HTN and BPH (corrected with TURP), and a psychiatric h/o of bipolar affective disorder. He is awaiting a dental consult, an ECG, and a chest x-ray pending ECT clearance. He denies any symptoms or complaints at this time.   1. Bipolar affective disorder: Continue treatment as per primary psychiatry team: Lorazepam standing; Lorazepam PRN  2. HTN: Pt's BP is optimized with no medication

## 2017-07-12 DIAGNOSIS — F32.9 MAJOR DEPRESSIVE DISORDER, SINGLE EPISODE, UNSPECIFIED: ICD-10-CM

## 2017-07-12 DIAGNOSIS — I10 ESSENTIAL (PRIMARY) HYPERTENSION: ICD-10-CM

## 2017-07-12 DIAGNOSIS — N40.0 BENIGN PROSTATIC HYPERPLASIA WITHOUT LOWER URINARY TRACT SYMPTOMS: ICD-10-CM

## 2017-07-12 PROCEDURE — 99223 1ST HOSP IP/OBS HIGH 75: CPT

## 2017-07-12 PROCEDURE — 99232 SBSQ HOSP IP/OBS MODERATE 35: CPT

## 2017-07-12 RX ORDER — FINASTERIDE 5 MG/1
5 TABLET, FILM COATED ORAL DAILY
Qty: 0 | Refills: 0 | Status: DISCONTINUED | OUTPATIENT
Start: 2017-07-12 | End: 2017-07-28

## 2017-07-12 RX ORDER — CHOLECALCIFEROL (VITAMIN D3) 125 MCG
400 CAPSULE ORAL DAILY
Qty: 0 | Refills: 0 | Status: DISCONTINUED | OUTPATIENT
Start: 2017-07-12 | End: 2017-07-28

## 2017-07-12 RX ADMIN — Medication 1 MILLIGRAM(S): at 12:00

## 2017-07-12 RX ADMIN — Medication 1 MILLIGRAM(S): at 09:11

## 2017-07-12 RX ADMIN — Medication 1 MILLIGRAM(S): at 20:25

## 2017-07-12 RX ADMIN — Medication 1 MILLIGRAM(S): at 15:35

## 2017-07-12 NOTE — CONSULT NOTE ADULT - PROBLEM SELECTOR RECOMMENDATION 9
ECT specific risk assessment: pt without history of increased ICP, aneurysm, active pulmonary disease, valvular disease, CAD, cerebral vascular disease.   EKG TWI are partly chronic. in the absence of symptoms and chronicity of these inversions, low suspicion for underlying ischemia.   Cardiovascular risk assessment: Pt evaluated using the revised cardiac risk index and is felt to be low cardiovascular risk for a low cardiovascular risk procedure based on these criteria. The risk has been discussed with the patient  and the patient wishes to proceed understanding that ECT is a low risk procedure.     Risk for complication is low. Patient is optimized for ECT treatment without further intervention and can proceed with treatment.

## 2017-07-12 NOTE — CONSULT NOTE ADULT - SUBJECTIVE AND OBJECTIVE BOX
Patient is a 71y old  Male who presents with a chief complaint of depression  HPI: 71 M with PMHx of HTN, BPH here for ECT. patient is very anxious but directable and can answer questions denies any hc of cardiovascular disease. no hx of smoking, DM, or significant family hx of CAD. has good exercise tolerance and functional capacity. denies CP, SOB, LOC, palpitations. no prior ischemic w.u.     SUBJECTIVE / OVERNIGHT EVENTS: no events     MEDICATIONS  (STANDING):  LORazepam     Tablet 1 milliGRAM(s) Oral three times a day    MEDICATIONS  (PRN):  LORazepam     Tablet 1 milliGRAM(s) Oral every 6 hours PRN Agitation  LORazepam   Injectable 1 milliGRAM(s) IntraMuscular once PRN Agitation      T(C): 36.4 (07-12-17 @ 08:10)  HR: --68-89  BP: --120/76  RR: 12 (07-11-17 @ 20:42)  SpO2: --  CAPILLARY BLOOD GLUCOSE        I&O's Summary      PHYSICAL EXAM:  GENERAL: NAD, well-developed, AOx3  HEAD:  Atraumatic, Normocephalic  EYES: EOMI, PERRL, conjunctiva and sclera clear  NECK: Supple, No JVD  CHEST/LUNG: Clear to auscultation bilaterally  HEART: Regular rate and rhythm; No murmurs, rubs, or gallops, No Edema  ABDOMEN: Soft, Nontender, Nondistended; Bowel sounds present  EXTREMITIES:  2+ Peripheral Pulses, No clubbing, cyanosis  PSYCH: No SI/HI  NEUROLOGY: non-focal  SKIN: No rashes or lesions    LABS:                        16.7   7.92  )-----------( 229      ( 11 Jul 2017 08:30 )             47.5     07-11    141  |  103  |  21  ----------------------------<  85  4.3   |  21<L>  |  0.99    Ca    9.9      11 Jul 2017 08:30    TPro  7.5  /  Alb  4.6  /  TBili  1.5<H>  /  DBili  x   /  AST  26  /  ALT  29  /  AlkPhos  68  07-11                  RADIOLOGY & ADDITIONAL TESTS: personally reviewed EKG with TWI 2 and AVF, compared to prior EKG TWI present in 2, flat T wave in AVF    Imaging Personally Reviewed:    Consultant(s) Notes Reviewed:      Care Discussed with Consultants/Other Providers:

## 2017-07-13 PROCEDURE — 99232 SBSQ HOSP IP/OBS MODERATE 35: CPT

## 2017-07-13 RX ADMIN — Medication 1 MILLIGRAM(S): at 22:15

## 2017-07-13 RX ADMIN — FINASTERIDE 5 MILLIGRAM(S): 5 TABLET, FILM COATED ORAL at 08:11

## 2017-07-13 RX ADMIN — Medication 1 MILLIGRAM(S): at 06:37

## 2017-07-13 RX ADMIN — Medication 1 MILLIGRAM(S): at 21:20

## 2017-07-13 RX ADMIN — Medication 1 MILLIGRAM(S): at 12:18

## 2017-07-13 RX ADMIN — Medication 400 UNIT(S): at 08:11

## 2017-07-14 PROCEDURE — 99232 SBSQ HOSP IP/OBS MODERATE 35: CPT

## 2017-07-14 PROCEDURE — 71020: CPT | Mod: 26

## 2017-07-14 RX ADMIN — FINASTERIDE 5 MILLIGRAM(S): 5 TABLET, FILM COATED ORAL at 08:11

## 2017-07-14 RX ADMIN — Medication 400 UNIT(S): at 08:11

## 2017-07-14 RX ADMIN — Medication 1 MILLIGRAM(S): at 15:21

## 2017-07-14 RX ADMIN — Medication 1 MILLIGRAM(S): at 21:11

## 2017-07-14 RX ADMIN — Medication 1 MILLIGRAM(S): at 06:26

## 2017-07-15 RX ADMIN — Medication 400 UNIT(S): at 09:11

## 2017-07-15 RX ADMIN — Medication 1 MILLIGRAM(S): at 16:20

## 2017-07-15 RX ADMIN — Medication 1 MILLIGRAM(S): at 12:02

## 2017-07-15 RX ADMIN — FINASTERIDE 5 MILLIGRAM(S): 5 TABLET, FILM COATED ORAL at 09:11

## 2017-07-15 RX ADMIN — Medication 1 MILLIGRAM(S): at 21:14

## 2017-07-15 RX ADMIN — Medication 1 MILLIGRAM(S): at 06:37

## 2017-07-16 PROCEDURE — 99231 SBSQ HOSP IP/OBS SF/LOW 25: CPT

## 2017-07-16 RX ADMIN — Medication 1 MILLIGRAM(S): at 20:52

## 2017-07-16 RX ADMIN — Medication 1 MILLIGRAM(S): at 06:07

## 2017-07-16 RX ADMIN — Medication 1 MILLIGRAM(S): at 12:31

## 2017-07-16 RX ADMIN — Medication 400 UNIT(S): at 09:11

## 2017-07-16 RX ADMIN — Medication 1 MILLIGRAM(S): at 23:04

## 2017-07-16 RX ADMIN — FINASTERIDE 5 MILLIGRAM(S): 5 TABLET, FILM COATED ORAL at 09:11

## 2017-07-17 PROCEDURE — 90870 ELECTROCONVULSIVE THERAPY: CPT

## 2017-07-17 RX ADMIN — FINASTERIDE 5 MILLIGRAM(S): 5 TABLET, FILM COATED ORAL at 15:20

## 2017-07-17 RX ADMIN — Medication 1 MILLIGRAM(S): at 06:23

## 2017-07-17 RX ADMIN — Medication 1 MILLIGRAM(S): at 20:34

## 2017-07-17 RX ADMIN — Medication 400 UNIT(S): at 15:19

## 2017-07-17 RX ADMIN — Medication 1 MILLIGRAM(S): at 17:30

## 2017-07-17 RX ADMIN — Medication 1 MILLIGRAM(S): at 15:19

## 2017-07-18 PROCEDURE — 90832 PSYTX W PT 30 MINUTES: CPT

## 2017-07-18 PROCEDURE — 99232 SBSQ HOSP IP/OBS MODERATE 35: CPT

## 2017-07-18 RX ADMIN — Medication 400 UNIT(S): at 08:47

## 2017-07-18 RX ADMIN — Medication 1 MILLIGRAM(S): at 13:19

## 2017-07-18 RX ADMIN — Medication 1 MILLIGRAM(S): at 06:02

## 2017-07-18 RX ADMIN — Medication 1 MILLIGRAM(S): at 20:16

## 2017-07-18 RX ADMIN — FINASTERIDE 5 MILLIGRAM(S): 5 TABLET, FILM COATED ORAL at 08:47

## 2017-07-18 RX ADMIN — Medication 1 MILLIGRAM(S): at 09:10

## 2017-07-19 PROCEDURE — 99232 SBSQ HOSP IP/OBS MODERATE 35: CPT | Mod: 25

## 2017-07-19 PROCEDURE — 90870 ELECTROCONVULSIVE THERAPY: CPT

## 2017-07-19 RX ADMIN — Medication 1 MILLIGRAM(S): at 21:51

## 2017-07-19 RX ADMIN — FINASTERIDE 5 MILLIGRAM(S): 5 TABLET, FILM COATED ORAL at 14:10

## 2017-07-19 RX ADMIN — Medication 1 MILLIGRAM(S): at 06:09

## 2017-07-19 RX ADMIN — Medication 400 UNIT(S): at 14:10

## 2017-07-19 RX ADMIN — Medication 1 MILLIGRAM(S): at 14:09

## 2017-07-19 RX ADMIN — Medication 1 MILLIGRAM(S): at 20:30

## 2017-07-20 PROCEDURE — 99232 SBSQ HOSP IP/OBS MODERATE 35: CPT

## 2017-07-20 PROCEDURE — 90832 PSYTX W PT 30 MINUTES: CPT

## 2017-07-20 RX ORDER — HALOPERIDOL DECANOATE 100 MG/ML
0.5 INJECTION INTRAMUSCULAR EVERY 6 HOURS
Qty: 0 | Refills: 0 | Status: DISCONTINUED | OUTPATIENT
Start: 2017-07-20 | End: 2017-07-28

## 2017-07-20 RX ADMIN — Medication 1 MILLIGRAM(S): at 12:35

## 2017-07-20 RX ADMIN — Medication 400 UNIT(S): at 08:27

## 2017-07-20 RX ADMIN — HALOPERIDOL DECANOATE 0.5 MILLIGRAM(S): 100 INJECTION INTRAMUSCULAR at 07:36

## 2017-07-20 RX ADMIN — FINASTERIDE 5 MILLIGRAM(S): 5 TABLET, FILM COATED ORAL at 08:27

## 2017-07-20 RX ADMIN — Medication 1 MILLIGRAM(S): at 03:10

## 2017-07-20 RX ADMIN — HALOPERIDOL DECANOATE 0.5 MILLIGRAM(S): 100 INJECTION INTRAMUSCULAR at 15:28

## 2017-07-20 RX ADMIN — Medication 1 MILLIGRAM(S): at 08:55

## 2017-07-20 RX ADMIN — Medication 1 MILLIGRAM(S): at 20:37

## 2017-07-21 PROCEDURE — 99232 SBSQ HOSP IP/OBS MODERATE 35: CPT | Mod: 25

## 2017-07-21 PROCEDURE — 90870 ELECTROCONVULSIVE THERAPY: CPT

## 2017-07-21 RX ADMIN — HALOPERIDOL DECANOATE 0.5 MILLIGRAM(S): 100 INJECTION INTRAMUSCULAR at 15:39

## 2017-07-21 RX ADMIN — Medication 1 MILLIGRAM(S): at 20:42

## 2017-07-21 RX ADMIN — Medication 1 MILLIGRAM(S): at 13:34

## 2017-07-21 RX ADMIN — Medication 1 MILLIGRAM(S): at 06:27

## 2017-07-22 PROCEDURE — 99232 SBSQ HOSP IP/OBS MODERATE 35: CPT

## 2017-07-22 RX ADMIN — Medication 400 UNIT(S): at 08:56

## 2017-07-22 RX ADMIN — Medication 1 MILLIGRAM(S): at 13:40

## 2017-07-22 RX ADMIN — Medication 1 MILLIGRAM(S): at 06:06

## 2017-07-22 RX ADMIN — Medication 1 MILLIGRAM(S): at 20:32

## 2017-07-22 RX ADMIN — FINASTERIDE 5 MILLIGRAM(S): 5 TABLET, FILM COATED ORAL at 08:56

## 2017-07-23 PROCEDURE — 99232 SBSQ HOSP IP/OBS MODERATE 35: CPT

## 2017-07-23 RX ORDER — LANOLIN ALCOHOL/MO/W.PET/CERES
3 CREAM (GRAM) TOPICAL AT BEDTIME
Qty: 0 | Refills: 0 | Status: DISCONTINUED | OUTPATIENT
Start: 2017-07-23 | End: 2017-07-28

## 2017-07-23 RX ADMIN — Medication 1 MILLIGRAM(S): at 06:29

## 2017-07-23 RX ADMIN — Medication 400 UNIT(S): at 09:17

## 2017-07-23 RX ADMIN — Medication 1 MILLIGRAM(S): at 20:01

## 2017-07-23 RX ADMIN — Medication 1 MILLIGRAM(S): at 13:58

## 2017-07-23 RX ADMIN — HALOPERIDOL DECANOATE 0.5 MILLIGRAM(S): 100 INJECTION INTRAMUSCULAR at 11:09

## 2017-07-23 RX ADMIN — FINASTERIDE 5 MILLIGRAM(S): 5 TABLET, FILM COATED ORAL at 09:17

## 2017-07-24 PROCEDURE — 90870 ELECTROCONVULSIVE THERAPY: CPT

## 2017-07-24 PROCEDURE — 99232 SBSQ HOSP IP/OBS MODERATE 35: CPT | Mod: 25

## 2017-07-24 RX ORDER — HALOPERIDOL DECANOATE 100 MG/ML
2 INJECTION INTRAMUSCULAR
Qty: 0 | Refills: 0 | Status: DISCONTINUED | OUTPATIENT
Start: 2017-07-24 | End: 2017-07-28

## 2017-07-24 RX ADMIN — Medication 1 MILLIGRAM(S): at 06:22

## 2017-07-24 RX ADMIN — HALOPERIDOL DECANOATE 2 MILLIGRAM(S): 100 INJECTION INTRAMUSCULAR at 21:08

## 2017-07-24 RX ADMIN — HALOPERIDOL DECANOATE 0.5 MILLIGRAM(S): 100 INJECTION INTRAMUSCULAR at 20:22

## 2017-07-24 RX ADMIN — Medication 1 MILLIGRAM(S): at 21:04

## 2017-07-24 RX ADMIN — Medication 1 MILLIGRAM(S): at 13:43

## 2017-07-24 RX ADMIN — HALOPERIDOL DECANOATE 0.5 MILLIGRAM(S): 100 INJECTION INTRAMUSCULAR at 14:16

## 2017-07-24 RX ADMIN — Medication 3 MILLIGRAM(S): at 21:52

## 2017-07-25 PROCEDURE — 99232 SBSQ HOSP IP/OBS MODERATE 35: CPT

## 2017-07-25 RX ADMIN — FINASTERIDE 5 MILLIGRAM(S): 5 TABLET, FILM COATED ORAL at 08:37

## 2017-07-25 RX ADMIN — Medication 1 MILLIGRAM(S): at 12:22

## 2017-07-25 RX ADMIN — Medication 1 MILLIGRAM(S): at 06:08

## 2017-07-25 RX ADMIN — HALOPERIDOL DECANOATE 2 MILLIGRAM(S): 100 INJECTION INTRAMUSCULAR at 08:37

## 2017-07-25 RX ADMIN — Medication 400 UNIT(S): at 08:37

## 2017-07-25 RX ADMIN — Medication 1 MILLIGRAM(S): at 20:27

## 2017-07-25 RX ADMIN — HALOPERIDOL DECANOATE 2 MILLIGRAM(S): 100 INJECTION INTRAMUSCULAR at 20:27

## 2017-07-26 PROCEDURE — 99232 SBSQ HOSP IP/OBS MODERATE 35: CPT | Mod: 25

## 2017-07-26 PROCEDURE — 90870 ELECTROCONVULSIVE THERAPY: CPT

## 2017-07-26 RX ADMIN — Medication 1 MILLIGRAM(S): at 13:00

## 2017-07-26 RX ADMIN — HALOPERIDOL DECANOATE 0.5 MILLIGRAM(S): 100 INJECTION INTRAMUSCULAR at 18:21

## 2017-07-26 RX ADMIN — HALOPERIDOL DECANOATE 2 MILLIGRAM(S): 100 INJECTION INTRAMUSCULAR at 11:46

## 2017-07-26 RX ADMIN — HALOPERIDOL DECANOATE 2 MILLIGRAM(S): 100 INJECTION INTRAMUSCULAR at 21:15

## 2017-07-26 RX ADMIN — Medication 1 MILLIGRAM(S): at 21:15

## 2017-07-26 RX ADMIN — Medication 400 UNIT(S): at 11:45

## 2017-07-26 RX ADMIN — Medication 1 MILLIGRAM(S): at 06:34

## 2017-07-26 RX ADMIN — FINASTERIDE 5 MILLIGRAM(S): 5 TABLET, FILM COATED ORAL at 11:46

## 2017-07-27 PROCEDURE — 99232 SBSQ HOSP IP/OBS MODERATE 35: CPT

## 2017-07-27 RX ORDER — HALOPERIDOL DECANOATE 100 MG/ML
1 INJECTION INTRAMUSCULAR
Qty: 0 | Refills: 0 | COMMUNITY
Start: 2017-07-27

## 2017-07-27 RX ORDER — FINASTERIDE 5 MG/1
1 TABLET, FILM COATED ORAL
Qty: 0 | Refills: 0 | COMMUNITY
Start: 2017-07-27

## 2017-07-27 RX ORDER — CHOLECALCIFEROL (VITAMIN D3) 125 MCG
400 CAPSULE ORAL
Qty: 0 | Refills: 0 | COMMUNITY
Start: 2017-07-27

## 2017-07-27 RX ORDER — LANOLIN ALCOHOL/MO/W.PET/CERES
1 CREAM (GRAM) TOPICAL
Qty: 0 | Refills: 0 | COMMUNITY
Start: 2017-07-27

## 2017-07-27 RX ADMIN — HALOPERIDOL DECANOATE 2 MILLIGRAM(S): 100 INJECTION INTRAMUSCULAR at 20:34

## 2017-07-27 RX ADMIN — HALOPERIDOL DECANOATE 2 MILLIGRAM(S): 100 INJECTION INTRAMUSCULAR at 09:16

## 2017-07-27 RX ADMIN — Medication 400 UNIT(S): at 09:15

## 2017-07-27 RX ADMIN — Medication 1 MILLIGRAM(S): at 05:57

## 2017-07-27 RX ADMIN — Medication 1 MILLIGRAM(S): at 20:34

## 2017-07-27 RX ADMIN — FINASTERIDE 5 MILLIGRAM(S): 5 TABLET, FILM COATED ORAL at 09:16

## 2017-07-27 RX ADMIN — Medication 1 MILLIGRAM(S): at 12:29

## 2017-07-28 ENCOUNTER — OUTPATIENT (OUTPATIENT)
Dept: OUTPATIENT SERVICES | Facility: HOSPITAL | Age: 72
LOS: 1 days | Discharge: HOME | End: 2017-07-28

## 2017-07-28 VITALS — TEMPERATURE: 97 F

## 2017-07-28 DIAGNOSIS — F33.9 MAJOR DEPRESSIVE DISORDER, RECURRENT, UNSPECIFIED: ICD-10-CM

## 2017-07-28 PROCEDURE — 99238 HOSP IP/OBS DSCHRG MGMT 30/<: CPT | Mod: 25

## 2017-07-28 PROCEDURE — 90870 ELECTROCONVULSIVE THERAPY: CPT

## 2017-07-28 RX ADMIN — Medication 400 UNIT(S): at 12:43

## 2017-07-28 RX ADMIN — Medication 1 MILLIGRAM(S): at 06:35

## 2017-07-28 RX ADMIN — Medication 1 MILLIGRAM(S): at 12:43

## 2017-07-28 RX ADMIN — FINASTERIDE 5 MILLIGRAM(S): 5 TABLET, FILM COATED ORAL at 12:43

## 2017-08-30 ENCOUNTER — EMERGENCY (EMERGENCY)
Facility: HOSPITAL | Age: 72
LOS: 1 days | Discharge: HOME | End: 2017-08-30
Attending: EMERGENCY MEDICINE | Admitting: EMERGENCY MEDICINE
Payer: COMMERCIAL

## 2017-08-30 VITALS
HEIGHT: 70 IN | WEIGHT: 179.02 LBS | DIASTOLIC BLOOD PRESSURE: 82 MMHG | SYSTOLIC BLOOD PRESSURE: 125 MMHG | HEART RATE: 62 BPM | TEMPERATURE: 98 F | OXYGEN SATURATION: 98 % | RESPIRATION RATE: 16 BRPM

## 2017-08-30 VITALS
HEART RATE: 71 BPM | DIASTOLIC BLOOD PRESSURE: 85 MMHG | SYSTOLIC BLOOD PRESSURE: 134 MMHG | RESPIRATION RATE: 17 BRPM | OXYGEN SATURATION: 100 % | TEMPERATURE: 98 F

## 2017-08-30 PROCEDURE — 71100 X-RAY EXAM RIBS UNI 2 VIEWS: CPT | Mod: 26,RT

## 2017-08-30 PROCEDURE — 71020: CPT | Mod: 26

## 2017-08-30 PROCEDURE — 71046 X-RAY EXAM CHEST 2 VIEWS: CPT

## 2017-08-30 PROCEDURE — 71101 X-RAY EXAM UNILAT RIBS/CHEST: CPT

## 2017-08-30 PROCEDURE — 99284 EMERGENCY DEPT VISIT MOD MDM: CPT

## 2017-08-30 PROCEDURE — 99284 EMERGENCY DEPT VISIT MOD MDM: CPT | Mod: 25
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
## 2017-08-30 NOTE — ED PROVIDER NOTE - CONSTITUTIONAL, MLM
normal... Well appearing, older white male, well nourished, awake, alert, oriented to person, place, time/situation and in no apparent distress.
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
## 2017-09-03 DIAGNOSIS — R07.9 CHEST PAIN, UNSPECIFIED: ICD-10-CM

## 2017-09-03 DIAGNOSIS — S20.211A CONTUSION OF RIGHT FRONT WALL OF THORAX, INITIAL ENCOUNTER: ICD-10-CM

## 2017-09-03 DIAGNOSIS — I10 ESSENTIAL (PRIMARY) HYPERTENSION: ICD-10-CM
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
## 2018-03-23 ENCOUNTER — INPATIENT (INPATIENT)
Facility: HOSPITAL | Age: 73
LOS: 11 days | Discharge: HOME | End: 2018-04-04
Attending: PSYCHIATRY & NEUROLOGY | Admitting: PSYCHIATRY & NEUROLOGY
Payer: MEDICARE

## 2018-03-23 VITALS — HEART RATE: 104 BPM | DIASTOLIC BLOOD PRESSURE: 102 MMHG | RESPIRATION RATE: 20 BRPM | SYSTOLIC BLOOD PRESSURE: 159 MMHG

## 2018-03-23 DIAGNOSIS — F60.7 DEPENDENT PERSONALITY DISORDER: ICD-10-CM

## 2018-03-23 LAB
ALBUMIN SERPL ELPH-MCNC: 4.9 G/DL — SIGNIFICANT CHANGE UP (ref 3.3–5)
ALP SERPL-CCNC: 78 U/L — SIGNIFICANT CHANGE UP (ref 40–120)
ALT FLD-CCNC: 25 U/L — SIGNIFICANT CHANGE UP (ref 4–41)
APAP SERPL-MCNC: < 15 UG/ML — LOW (ref 15–25)
AST SERPL-CCNC: 20 U/L — SIGNIFICANT CHANGE UP (ref 4–40)
BARBITURATES MEASUREMENT: NEGATIVE — SIGNIFICANT CHANGE UP
BASOPHILS # BLD AUTO: 0.03 K/UL — SIGNIFICANT CHANGE UP (ref 0–0.2)
BASOPHILS NFR BLD AUTO: 0.3 % — SIGNIFICANT CHANGE UP (ref 0–2)
BENZODIAZ SERPL-MCNC: NEGATIVE — SIGNIFICANT CHANGE UP
BILIRUB SERPL-MCNC: 0.7 MG/DL — SIGNIFICANT CHANGE UP (ref 0.2–1.2)
BUN SERPL-MCNC: 19 MG/DL — SIGNIFICANT CHANGE UP (ref 7–23)
CALCIUM SERPL-MCNC: 9.5 MG/DL — SIGNIFICANT CHANGE UP (ref 8.4–10.5)
CHLORIDE SERPL-SCNC: 103 MMOL/L — SIGNIFICANT CHANGE UP (ref 98–107)
CO2 SERPL-SCNC: 28 MMOL/L — SIGNIFICANT CHANGE UP (ref 22–31)
CREAT SERPL-MCNC: 1.09 MG/DL — SIGNIFICANT CHANGE UP (ref 0.5–1.3)
EOSINOPHIL # BLD AUTO: 0.15 K/UL — SIGNIFICANT CHANGE UP (ref 0–0.5)
EOSINOPHIL NFR BLD AUTO: 1.7 % — SIGNIFICANT CHANGE UP (ref 0–6)
ETHANOL BLD-MCNC: < 10 MG/DL — SIGNIFICANT CHANGE UP
GLUCOSE SERPL-MCNC: 91 MG/DL — SIGNIFICANT CHANGE UP (ref 70–99)
HCT VFR BLD CALC: 46.4 % — SIGNIFICANT CHANGE UP (ref 39–50)
HGB BLD-MCNC: 15.9 G/DL — SIGNIFICANT CHANGE UP (ref 13–17)
IMM GRANULOCYTES # BLD AUTO: 0.03 # — SIGNIFICANT CHANGE UP
IMM GRANULOCYTES NFR BLD AUTO: 0.3 % — SIGNIFICANT CHANGE UP (ref 0–1.5)
LYMPHOCYTES # BLD AUTO: 1.98 K/UL — SIGNIFICANT CHANGE UP (ref 1–3.3)
LYMPHOCYTES # BLD AUTO: 22.9 % — SIGNIFICANT CHANGE UP (ref 13–44)
MCHC RBC-ENTMCNC: 32.9 PG — SIGNIFICANT CHANGE UP (ref 27–34)
MCHC RBC-ENTMCNC: 34.3 % — SIGNIFICANT CHANGE UP (ref 32–36)
MCV RBC AUTO: 96.1 FL — SIGNIFICANT CHANGE UP (ref 80–100)
MONOCYTES # BLD AUTO: 0.97 K/UL — HIGH (ref 0–0.9)
MONOCYTES NFR BLD AUTO: 11.2 % — SIGNIFICANT CHANGE UP (ref 2–14)
NEUTROPHILS # BLD AUTO: 5.49 K/UL — SIGNIFICANT CHANGE UP (ref 1.8–7.4)
NEUTROPHILS NFR BLD AUTO: 63.6 % — SIGNIFICANT CHANGE UP (ref 43–77)
NRBC # FLD: 0 — SIGNIFICANT CHANGE UP
PLATELET # BLD AUTO: 275 K/UL — SIGNIFICANT CHANGE UP (ref 150–400)
PMV BLD: 10.2 FL — SIGNIFICANT CHANGE UP (ref 7–13)
POTASSIUM SERPL-MCNC: 4.2 MMOL/L — SIGNIFICANT CHANGE UP (ref 3.5–5.3)
POTASSIUM SERPL-SCNC: 4.2 MMOL/L — SIGNIFICANT CHANGE UP (ref 3.5–5.3)
PROT SERPL-MCNC: 7.9 G/DL — SIGNIFICANT CHANGE UP (ref 6–8.3)
RBC # BLD: 4.83 M/UL — SIGNIFICANT CHANGE UP (ref 4.2–5.8)
RBC # FLD: 12.9 % — SIGNIFICANT CHANGE UP (ref 10.3–14.5)
SALICYLATES SERPL-MCNC: < 5 MG/DL — LOW (ref 15–30)
SODIUM SERPL-SCNC: 142 MMOL/L — SIGNIFICANT CHANGE UP (ref 135–145)
TSH SERPL-MCNC: 2.64 UIU/ML — SIGNIFICANT CHANGE UP (ref 0.27–4.2)
WBC # BLD: 8.65 K/UL — SIGNIFICANT CHANGE UP (ref 3.8–10.5)
WBC # FLD AUTO: 8.65 K/UL — SIGNIFICANT CHANGE UP (ref 3.8–10.5)

## 2018-03-23 PROCEDURE — 99285 EMERGENCY DEPT VISIT HI MDM: CPT

## 2018-03-23 RX ORDER — FINASTERIDE 5 MG/1
5 TABLET, FILM COATED ORAL DAILY
Qty: 0 | Refills: 0 | Status: DISCONTINUED | OUTPATIENT
Start: 2018-03-23 | End: 2018-04-04

## 2018-03-23 RX ORDER — HALOPERIDOL DECANOATE 100 MG/ML
5 INJECTION INTRAMUSCULAR ONCE
Qty: 0 | Refills: 0 | Status: DISCONTINUED | OUTPATIENT
Start: 2018-03-23 | End: 2018-03-26

## 2018-03-23 RX ORDER — HALOPERIDOL DECANOATE 100 MG/ML
5 INJECTION INTRAMUSCULAR EVERY 6 HOURS
Qty: 0 | Refills: 0 | Status: DISCONTINUED | OUTPATIENT
Start: 2018-03-23 | End: 2018-03-26

## 2018-03-23 RX ADMIN — Medication 2 MILLIGRAM(S): at 21:36

## 2018-03-23 NOTE — ED BEHAVIORAL HEALTH ASSESSMENT NOTE - DETAILS
SA by OD 5 years ago parkinsonism from antipsychotic handoff given to MARIN Dr. Arango self-referred

## 2018-03-23 NOTE — ED PROVIDER NOTE - MEDICAL DECISION MAKING DETAILS
73 y/o  M  hx BPH, Depression, HTN  Labs, Urine Tox/UA, EKG. No evidence of physical injuries, broken skin or deformities.   Medical evaluation performed. There is no clinical evidence of intoxication or any acute medical problem requiring immediate intervention. Patient is awaiting psychiatric consultation. Final disposition will be determined by psychiatrist.

## 2018-03-23 NOTE — ED ADULT NURSE NOTE - OBJECTIVE STATEMENT
72y M AaOx3 came in by cab with worsening depression and evidence of failure to thrive. pt has stated that his depression is slowly getting worse and it is affecting his ADL and IADL's. pt denies any thoughts of having SI or HI. pt states that he does not take his medications because he claimed that they do not work. pt denies any medical problems. pt denies any pain/discomfort at this 72y M AaOx3 came in by cab with worsening depression and evidence of failure to thrive. pt has stated that his depression is slowly getting worse and it is affecting his ADL and IADL's. pt denies any thoughts of having SI or HI. pt states that he does not take his medications because he claimed that they do not work. pt denies any medical problems. pt denies any pain/discomfort at this. pt request to have ECT done, but did not wish to have an appointment. pt agreed to be admitted. labs collected and sent , awaiting MD orders.

## 2018-03-23 NOTE — ED BEHAVIORAL HEALTH ASSESSMENT NOTE - DESCRIPTION
prostate CA, melanoma lives alone, works from home, single and no kids very anxious but in behavioral control    Vital Signs Last 24 Hrs  T(C): --  T(F): --  HR: 104 (23 Mar 2018 20:33) (104 - 104)  BP: 159/102 (23 Mar 2018 20:33) (159/102 - 159/102)  BP(mean): --  RR: 20 (23 Mar 2018 20:33) (20 - 20)  SpO2: --

## 2018-03-23 NOTE — ED ADULT NURSE NOTE - NS_BH TRG QUESTION8_ED_ALL_ED
Depression (without Suicidality or Psychosis)/Queenie (includes Bipolar Disorder)/Anxiety (includes Panic, OCD)

## 2018-03-23 NOTE — ED ADULT NURSE NOTE - CHIEF COMPLAINT QUOTE
Pt arrives noted to be extremely anxious and tearful on assessment keeps repeating "I just don't feel right I can't function". Reports admission @ [facility] for depression 7/17. Denies SI or HI, denies drug or alcohol use, denies medical complaints or history at this time, unable to obtain temperature in triage. PHYLLIS Thorne made aware, pt to go to .

## 2018-03-23 NOTE — ED ADULT TRIAGE NOTE - CHIEF COMPLAINT QUOTE
Pt arrives noted to be extremely anxious and tearful on assessment keeps repeating "I just don't feel right I can't function". Reports admission @ [facility] for depression 7/17. Denies SI or HI, denies drug or alcohol use, denies medical complaints or history at this time, unable to obtain temperature in triage. PHYLLIS Thorne made aware, pt to go to .

## 2018-03-23 NOTE — ED BEHAVIORAL HEALTH ASSESSMENT NOTE - OTHER
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

## 2018-03-23 NOTE — ED BEHAVIORAL HEALTH ASSESSMENT NOTE - OTHER PAST PSYCHIATRIC HISTORY (INCLUDE DETAILS REGARDING ONSET, COURSE OF ILLNESS, INPATIENT/OUTPATIENT TREATMENT)
multiple psych admissions, SA by OD 5 years ago, last admitted [facility] 7/17, received ect, was d/c on zyprexa, ativan, depakote

## 2018-03-23 NOTE — ED BEHAVIORAL HEALTH ASSESSMENT NOTE - HPI (INCLUDE ILLNESS QUALITY, SEVERITY, DURATION, TIMING, CONTEXT, MODIFYING FACTORS, ASSOCIATED SIGNS AND SYMPTOMS)
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   Following 7/17 [facility] hospitalization, pt states he felt well and therefore didn't take medication or follow-up with an outpatient psychiatrist. Pt also insists that medications don't help him. He acknowledges ECT helped him during [facility] hospitalization. Of note, pt presents severely anxious throughout assessment and is tearful at times. He is unkempt. Pt states he was feeling well until 3 weeks ago. He denies acute stressors. Pt repeatedly states "I can't function." He has difficulty elaborating on this but ultimately states he isn't caring for himself. States his sleep is poor, he is eating very little, and he is bathing infrequently. He usually works from home but has barely been working the past few weeks. He hasn't been going outside. States he goes out when he feels well. Pt also repeatedly states "I don't feel like myself." Pt is unable to elaborate. When asked if he has been feeling anxious and/or depressed, he responds "I just don't feel like myself!" He denies suicidal ideation, intent, or plan. He is unable/unwilling to answer remaining questions about depressive/anxiety symptoms. He does deny homicidal or violent ideation, intent, or plan. He denies manic or psychotic symptoms. He denies substance use. Pt requests voluntary psychiatric admission for stabilization.

## 2018-03-23 NOTE — ED ADULT TRIAGE NOTE - NS_BH TRG QUESTION8_ED_ALL_ED
Depression (without Suicidality or Psychosis)/Anxiety (includes Panic, OCD)/Queenie (includes Bipolar Disorder)

## 2018-03-24 PROCEDURE — 99223 1ST HOSP IP/OBS HIGH 75: CPT

## 2018-03-24 RX ORDER — LANOLIN ALCOHOL/MO/W.PET/CERES
3 CREAM (GRAM) TOPICAL AT BEDTIME
Qty: 0 | Refills: 0 | Status: DISCONTINUED | OUTPATIENT
Start: 2018-03-24 | End: 2018-03-28

## 2018-03-24 RX ADMIN — Medication 3 MILLIGRAM(S): at 02:10

## 2018-03-24 RX ADMIN — FINASTERIDE 5 MILLIGRAM(S): 5 TABLET, FILM COATED ORAL at 09:45

## 2018-03-25 PROCEDURE — 99231 SBSQ HOSP IP/OBS SF/LOW 25: CPT

## 2018-03-25 RX ADMIN — FINASTERIDE 5 MILLIGRAM(S): 5 TABLET, FILM COATED ORAL at 09:40

## 2018-03-25 RX ADMIN — Medication 1 MILLIGRAM(S): at 11:14

## 2018-03-26 PROCEDURE — 99232 SBSQ HOSP IP/OBS MODERATE 35: CPT

## 2018-03-26 RX ADMIN — Medication 2 MILLIGRAM(S): at 07:44

## 2018-03-26 RX ADMIN — FINASTERIDE 5 MILLIGRAM(S): 5 TABLET, FILM COATED ORAL at 07:44

## 2018-03-27 LAB
CHOLEST SERPL-MCNC: 134 MG/DL — SIGNIFICANT CHANGE UP (ref 120–199)
HBA1C BLD-MCNC: 4.9 % — SIGNIFICANT CHANGE UP (ref 4–5.6)
HDLC SERPL-MCNC: 53 MG/DL — SIGNIFICANT CHANGE UP (ref 35–55)
LIPID PNL WITH DIRECT LDL SERPL: 76 MG/DL — SIGNIFICANT CHANGE UP
TRIGL SERPL-MCNC: 99 MG/DL — SIGNIFICANT CHANGE UP (ref 10–149)

## 2018-03-27 PROCEDURE — 99232 SBSQ HOSP IP/OBS MODERATE 35: CPT

## 2018-03-27 RX ORDER — CHOLECALCIFEROL (VITAMIN D3) 125 MCG
1000 CAPSULE ORAL DAILY
Qty: 0 | Refills: 0 | Status: DISCONTINUED | OUTPATIENT
Start: 2018-03-27 | End: 2018-04-04

## 2018-03-27 RX ADMIN — FINASTERIDE 5 MILLIGRAM(S): 5 TABLET, FILM COATED ORAL at 09:22

## 2018-03-27 RX ADMIN — Medication 3 MILLIGRAM(S): at 01:10

## 2018-03-27 NOTE — ED BEHAVIORAL HEALTH NOTE - BEHAVIORAL HEALTH NOTE
Writer was informed Db determined Patient had insurance today and wanted writer to call to obtain pre-certification for db on 3/23.  Writer called [facility]na   and spoke to Chelsea Dubon.  Ling transferred call to clinician to accept clinical and writer was placed on HOLD. Lynetter was informed Db determined Patient had insurance today and wanted lynetter to call to obtain pre-certification for db on 3/23.  Writer called tna   and spoke to Chelsea Dubon.  Ling transferred call to clinician to accept clinical and  was provided a pending auth #[id number] placed on HOLD for a clinician. Writer was informed Db determined Patient had insurance today and wanted writer to call to obtain pre-certification for db on 3/23.  Writer called [employer]   and spoke to Chelsea Dubon.  Ling transferred call to clinician to accept clinical and writer was provided a pending auth #[id number] placed on HOLD for a clinician.  Writer spoke to Freya ROWE who accepted clinical information.  She states precert must be done within 24 hours based on clinical necessity. Writer was informed [facility] determined Patient had insurance today and wanted writer to call to obtain pre-certification for [facility] on 3/23.  Writer called [facility]   and spoke to Cheslea Dubon.  Ling transferred call to clinician to accept clinical and writer was provided a pending auth #[id number] placed on HOLD for a clinician.  Writer spoke to Freya ROWE who accepted clinical information.  She states precert must be done within 24 hours based on clinical necessity.  She states patient is not meeting criteria for admission at this time.  She requests [facility] send Urine drug screen Urinalysis, Notes regarding suicidality, outpatient providers phone numbers and Primary care doctor, updated LABS, MAR, H+P, and does he need ECT ??   She states it needs to be received by 2:20 pm 3/28/18  [phone]  fax : [phone] Lynetter was informed Db determined Patient had insurance today and wanted writer to call to obtain pre-certification for [facility] on 3/23.  Writer called [facility]   and spoke to Chelsea Dubon.  Ling transferred call to clinician to accept clinical and writer was provided a pending auth #[id number] placed on HOLD for a clinician.  Writer spoke to Freya ROWE who accepted clinical information.  She states precert must be done within 24 hours based on clinical necessity.  She states patient is not meeting criteria for admission at this time.  She requests [facility] send Urine drug screen Urinalysis, Notes regarding suicidality, outpatient providers phone numbers and Primary care doctor, updated LABS, MAR, H+P, and does he need ECT ??   She states it needs to be received by 2:20 pm 3/28/18  [phone]  fax : [phone].   Lynetter emailed [facility] with above information to send patient's chart to the insurance company for authorization.

## 2018-03-28 PROCEDURE — 90870 ELECTROCONVULSIVE THERAPY: CPT

## 2018-03-28 PROCEDURE — 99232 SBSQ HOSP IP/OBS MODERATE 35: CPT | Mod: 25

## 2018-03-28 RX ORDER — LANOLIN ALCOHOL/MO/W.PET/CERES
5 CREAM (GRAM) TOPICAL AT BEDTIME
Qty: 0 | Refills: 0 | Status: DISCONTINUED | OUTPATIENT
Start: 2018-03-28 | End: 2018-03-29

## 2018-03-28 RX ADMIN — Medication 5 MILLIGRAM(S): at 20:21

## 2018-03-28 RX ADMIN — Medication 1 MILLIGRAM(S): at 15:40

## 2018-03-28 RX ADMIN — Medication 1000 UNIT(S): at 10:25

## 2018-03-28 RX ADMIN — Medication 1 MILLIGRAM(S): at 14:40

## 2018-03-28 RX ADMIN — Medication 1 MILLIGRAM(S): at 20:40

## 2018-03-28 RX ADMIN — Medication 1 MILLIGRAM(S): at 02:12

## 2018-03-28 RX ADMIN — FINASTERIDE 5 MILLIGRAM(S): 5 TABLET, FILM COATED ORAL at 10:25

## 2018-03-29 RX ORDER — LANOLIN ALCOHOL/MO/W.PET/CERES
10 CREAM (GRAM) TOPICAL AT BEDTIME
Qty: 0 | Refills: 0 | Status: DISCONTINUED | OUTPATIENT
Start: 2018-03-29 | End: 2018-03-29

## 2018-03-29 RX ORDER — LANOLIN ALCOHOL/MO/W.PET/CERES
5 CREAM (GRAM) TOPICAL AT BEDTIME
Qty: 0 | Refills: 0 | Status: DISCONTINUED | OUTPATIENT
Start: 2018-03-29 | End: 2018-04-04

## 2018-03-29 RX ADMIN — Medication 1000 UNIT(S): at 09:40

## 2018-03-29 RX ADMIN — Medication 5 MILLIGRAM(S): at 22:26

## 2018-03-29 RX ADMIN — FINASTERIDE 5 MILLIGRAM(S): 5 TABLET, FILM COATED ORAL at 09:40

## 2018-03-29 NOTE — ED BEHAVIORAL HEALTH NOTE - BEHAVIORAL HEALTH NOTE
On 3/27/18, lynetter called Freya, but she had left the office. Lynetter gathered the following from [facility] and [place] and faxed it to her: labs and urine tox screen done at time of ED presentation (do not have updated labs in [place]), notes regarding the planned start of ECT for tomorrow, and the fact that the patient was screened for it and agreed, all orders at [facility] ED and on the unit at [facility] (cannot access MAR’s), H + P done in [facility] ED, and the one statement that the patient made at [facility] regarding suicidality.  left Radhika voicemail message stating the OP provider and PCP can be provided at the time of concurrent review, as well as more information regarding suicidality, as per the [facility] . Lynetter emailed colleagues the above information.

## 2018-03-30 PROCEDURE — 90870 ELECTROCONVULSIVE THERAPY: CPT

## 2018-03-30 PROCEDURE — 99232 SBSQ HOSP IP/OBS MODERATE 35: CPT | Mod: 25

## 2018-03-30 RX ADMIN — Medication 1000 UNIT(S): at 12:37

## 2018-03-30 RX ADMIN — Medication 1 MILLIGRAM(S): at 21:30

## 2018-03-30 RX ADMIN — FINASTERIDE 5 MILLIGRAM(S): 5 TABLET, FILM COATED ORAL at 12:37

## 2018-03-30 RX ADMIN — Medication 5 MILLIGRAM(S): at 21:30

## 2018-03-30 RX ADMIN — Medication 1 MILLIGRAM(S): at 16:22

## 2018-03-31 PROCEDURE — 99231 SBSQ HOSP IP/OBS SF/LOW 25: CPT

## 2018-03-31 RX ADMIN — FINASTERIDE 5 MILLIGRAM(S): 5 TABLET, FILM COATED ORAL at 08:21

## 2018-03-31 RX ADMIN — Medication 1000 UNIT(S): at 08:21

## 2018-03-31 RX ADMIN — Medication 1 MILLIGRAM(S): at 08:59

## 2018-04-01 RX ADMIN — Medication 1000 UNIT(S): at 10:30

## 2018-04-01 RX ADMIN — FINASTERIDE 5 MILLIGRAM(S): 5 TABLET, FILM COATED ORAL at 10:28

## 2018-04-02 PROCEDURE — 99232 SBSQ HOSP IP/OBS MODERATE 35: CPT | Mod: 25

## 2018-04-02 PROCEDURE — 90870 ELECTROCONVULSIVE THERAPY: CPT

## 2018-04-02 RX ADMIN — FINASTERIDE 5 MILLIGRAM(S): 5 TABLET, FILM COATED ORAL at 10:43

## 2018-04-02 RX ADMIN — Medication 1 MILLIGRAM(S): at 20:30

## 2018-04-02 RX ADMIN — Medication 1000 UNIT(S): at 10:43

## 2018-04-02 RX ADMIN — Medication 5 MILLIGRAM(S): at 20:30

## 2018-04-03 PROCEDURE — 99232 SBSQ HOSP IP/OBS MODERATE 35: CPT

## 2018-04-03 RX ADMIN — Medication 1000 UNIT(S): at 08:59

## 2018-04-03 RX ADMIN — Medication 5 MILLIGRAM(S): at 21:55

## 2018-04-03 RX ADMIN — FINASTERIDE 5 MILLIGRAM(S): 5 TABLET, FILM COATED ORAL at 08:59

## 2018-04-04 VITALS — RESPIRATION RATE: 18 BRPM | TEMPERATURE: 98 F

## 2018-04-04 PROCEDURE — 99238 HOSP IP/OBS DSCHRG MGMT 30/<: CPT | Mod: 25

## 2018-04-04 RX ORDER — FINASTERIDE 5 MG/1
1 TABLET, FILM COATED ORAL
Qty: 0 | Refills: 0 | DISCHARGE
Start: 2018-04-04

## 2018-04-04 RX ORDER — CHOLECALCIFEROL (VITAMIN D3) 125 MCG
1000 CAPSULE ORAL
Qty: 0 | Refills: 0 | DISCHARGE
Start: 2018-04-04

## 2018-04-04 RX ADMIN — Medication 1000 UNIT(S): at 10:59

## 2018-04-04 RX ADMIN — FINASTERIDE 5 MILLIGRAM(S): 5 TABLET, FILM COATED ORAL at 10:59

## 2018-04-09 ENCOUNTER — OUTPATIENT (OUTPATIENT)
Dept: OUTPATIENT SERVICES | Facility: HOSPITAL | Age: 73
LOS: 1 days | Discharge: HOME | End: 2018-04-09

## 2018-04-09 DIAGNOSIS — F33.9 MAJOR DEPRESSIVE DISORDER, RECURRENT, UNSPECIFIED: ICD-10-CM

## 2018-04-30 ENCOUNTER — EMERGENCY (EMERGENCY)
Facility: HOSPITAL | Age: 73
LOS: 1 days | Discharge: HOME | End: 2018-04-30
Admitting: EMERGENCY MEDICINE
Payer: MEDICARE

## 2018-04-30 VITALS
HEART RATE: 96 BPM | DIASTOLIC BLOOD PRESSURE: 92 MMHG | SYSTOLIC BLOOD PRESSURE: 151 MMHG | OXYGEN SATURATION: 100 % | RESPIRATION RATE: 18 BRPM | TEMPERATURE: 98 F

## 2018-04-30 DIAGNOSIS — F39 UNSPECIFIED MOOD [AFFECTIVE] DISORDER: ICD-10-CM

## 2018-04-30 DIAGNOSIS — F84.0 AUTISTIC DISORDER: ICD-10-CM

## 2018-04-30 PROCEDURE — 90792 PSYCH DIAG EVAL W/MED SRVCS: CPT

## 2018-04-30 PROCEDURE — 99284 EMERGENCY DEPT VISIT MOD MDM: CPT

## 2018-04-30 NOTE — ED BEHAVIORAL HEALTH ASSESSMENT NOTE - HPI (INCLUDE ILLNESS QUALITY, SEVERITY, DURATION, TIMING, CONTEXT, MODIFYING FACTORS, ASSOCIATED SIGNS AND SYMPTOMS)
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 Pt states that he is here get connected with ECT. Pt states he was sent here by his most recent inpatient psychiatrist and he Crisis clinic told him to get medical clearance for possible future ECT. Pt states that he doesn't know why they sent him her to the ED because he is not having any active suicidal ideation, homicidal ideation, auditory/visual hallucinations, or manic sx.  Pt states that he need is the name of any doctor who performs ECT. Pt states that multiple times, " I'm not having suicidal ideation; it ridiculous that they sent me her."

## 2018-04-30 NOTE — ED ADULT TRIAGE NOTE - CHIEF COMPLAINT QUOTE
"I need permission to go for ECT so they sent me here."  Pt requesting to see psychiatrist.  "savannah had this condition for 50 years."  Pt unable to state diagnosis.  denies suicidal/homicidal ideation.   h/o psych

## 2018-04-30 NOTE — ED PROVIDER NOTE - OBJECTIVE STATEMENT
This is a 72  year old Male PM hx BPH, Depression, HTN came to ED for ECT treatment. patient reports he wants ECT Treatment and is here for an evaluation to have it tomorrow. Reports he does not have an appointment . States " I need it It will help me" Reports recent discharge from [facility] and states he was not discharged on any medication. Patient becomes easily and increasingly agitated, yelling, and belligerent. Denies SI/HI Denies AH/VH Denies ETOH/Illicit drugs

## 2018-04-30 NOTE — ED BEHAVIORAL HEALTH ASSESSMENT NOTE - SUMMARY
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 Pt now presents asking for referral to ECT clinic. Pt states that he was told to come to the ED to get medical clearance for his ECT and that he is not suicidal. Pt states that its ridiculous that he was told to come into the ER. Pt denies any active suicidal ideation, homicidal ideation, auditory/visual hallucinations, manic sx.  Pt report  no safety concern in going home and states that all he needs is a referral to an outpatient psychiatrist who does ECT.  Pt offered VOL which he feel is not needed and would be ridiculous to do so.  Pt also doesn't meet criteria 9.39 admission and was given resource and referral for ECT.

## 2018-04-30 NOTE — ED BEHAVIORAL HEALTH ASSESSMENT NOTE - DIFFERENTIAL
Mood disorder, Bipolar disorder, personality disorder with dependent, borderline and narcissistic traits, r/o Autism spectrum d/o.

## 2018-04-30 NOTE — ED BEHAVIORAL HEALTH ASSESSMENT NOTE - RISK ASSESSMENT
Pt is currently at low imminent risk given that he reports no active suicidal ideation, homicidal ideation, auditory/visual hallucinations or bree. Pt is however, chronic moderate risk 2/2 h/o treatment  non-compliance.

## 2018-04-30 NOTE — ED ADULT NURSE REASSESSMENT NOTE - NS ED NURSE REASSESS COMMENT FT1
Patient in improved and stable condition, discharged as per NP Dania order, discharge instructions given, pt verbalized understanding and left ER a&ox3

## 2018-04-30 NOTE — ED BEHAVIORAL HEALTH ASSESSMENT NOTE - OTHER PAST PSYCHIATRIC HISTORY (INCLUDE DETAILS REGARDING ONSET, COURSE OF ILLNESS, INPATIENT/OUTPATIENT TREATMENT)
multiple psych admissions, SA by OD 5 years ago, last admitted [facility] 3/23/18, received ECT, was d/c on zyprexa, ativan, depakote

## 2018-04-30 NOTE — ED PROVIDER NOTE - MEDICAL DECISION MAKING DETAILS
This is a 72  year old Male PM hx BPH, Depression, HTN came to ED for ECT treatment. patient reports he wants ECT Treatment and is here for an evaluation to have it tomorrow. Medical evaluation performed. There is no clinical evidence of intoxication or any acute medical problem requiring immediate intervention. Final disposition will be determined by psychiatrist.

## 2018-04-30 NOTE — ED BEHAVIORAL HEALTH ASSESSMENT NOTE - DESCRIPTION
Calm and cooperative   Vital Signs Last 24 Hrs  T(C): 36.4 (30 Apr 2018 15:52), Max: 36.4 (30 Apr 2018 15:52)  T(F): 97.6 (30 Apr 2018 15:52), Max: 97.6 (30 Apr 2018 15:52)  HR: 96 (30 Apr 2018 15:52) (96 - 96)  BP: 151/92 (30 Apr 2018 15:52) (151/92 - 151/92)  BP(mean): --  RR: 18 (30 Apr 2018 15:52) (18 - 18)  SpO2: 100% (30 Apr 2018 15:52) (100% - 100%) prostate CA, melanoma lives alone, works from home, single and no kids

## 2018-04-30 NOTE — ED BEHAVIORAL HEALTH NOTE - BEHAVIORAL HEALTH NOTE
Writer attempted to obtain collateral information. The patient stated there is no one in his life that he keeps in touch with. He stated he was referred to go to ECT by inpatient providers. The EMR [facility], but he has not attended there. He stated there is no one is his life to contact for collateral information. Writer called numbers in the EMR, including a Lynetter attempted to obtain collateral information. The patient stated there is no one in his life that he keeps in touch with. He stated he was referred to go to ECT by inpatient providers. The EMR reports the [facility] would [facility], but he has not attended there. He stated there is no one is his life to contact for collateral information. Lynetter called numbers in the EMR, including a second cousin, Abdirizak Flowers, [phone], who verified that patient's brother, Keyon Radford, [phone] (number is not currently in service) is , which the patient had reported. The patient stated he does not speak with his sisters Melba and Joe, which Mr. Flowers verified. Mr. Flowers stated he last spoke with the patient in 2017, at which time the patient told Mr. Flowers to never call him again, and to stop sending him letters. Collateral information could not be obtained.      was asked to discuss going to [facility] with the patient, and getting referred to ECT by providers there, to which he agreed. Lynetter provided material regarding the [facility] to the patient.

## 2018-05-16 ENCOUNTER — EMERGENCY (EMERGENCY)
Facility: HOSPITAL | Age: 73
LOS: 1 days | Discharge: HOME | End: 2018-05-16
Attending: EMERGENCY MEDICINE | Admitting: EMERGENCY MEDICINE
Payer: MEDICARE

## 2018-05-16 ENCOUNTER — OUTPATIENT (OUTPATIENT)
Dept: OUTPATIENT SERVICES | Facility: HOSPITAL | Age: 73
LOS: 1 days | Discharge: STILL ADMITTED OR ADMINISTRATIVE | End: 2018-05-16
Payer: MEDICARE

## 2018-05-16 VITALS
RESPIRATION RATE: 16 BRPM | DIASTOLIC BLOOD PRESSURE: 101 MMHG | TEMPERATURE: 98 F | OXYGEN SATURATION: 100 % | SYSTOLIC BLOOD PRESSURE: 158 MMHG | HEART RATE: 76 BPM

## 2018-05-16 LAB
ALBUMIN SERPL ELPH-MCNC: 4.6 G/DL — SIGNIFICANT CHANGE UP (ref 3.3–5)
ALP SERPL-CCNC: 81 U/L — SIGNIFICANT CHANGE UP (ref 40–120)
ALT FLD-CCNC: 22 U/L — SIGNIFICANT CHANGE UP (ref 4–41)
APAP SERPL-MCNC: < 15 UG/ML — LOW (ref 15–25)
AST SERPL-CCNC: 20 U/L — SIGNIFICANT CHANGE UP (ref 4–40)
BASOPHILS # BLD AUTO: 0.04 K/UL — SIGNIFICANT CHANGE UP (ref 0–0.2)
BASOPHILS NFR BLD AUTO: 0.5 % — SIGNIFICANT CHANGE UP (ref 0–2)
BILIRUB SERPL-MCNC: 1.1 MG/DL — SIGNIFICANT CHANGE UP (ref 0.2–1.2)
BUN SERPL-MCNC: 19 MG/DL — SIGNIFICANT CHANGE UP (ref 7–23)
CALCIUM SERPL-MCNC: 9.9 MG/DL — SIGNIFICANT CHANGE UP (ref 8.4–10.5)
CHLORIDE SERPL-SCNC: 101 MMOL/L — SIGNIFICANT CHANGE UP (ref 98–107)
CO2 SERPL-SCNC: 22 MMOL/L — SIGNIFICANT CHANGE UP (ref 22–31)
CREAT SERPL-MCNC: 1.05 MG/DL — SIGNIFICANT CHANGE UP (ref 0.5–1.3)
EOSINOPHIL # BLD AUTO: 0.12 K/UL — SIGNIFICANT CHANGE UP (ref 0–0.5)
EOSINOPHIL NFR BLD AUTO: 1.5 % — SIGNIFICANT CHANGE UP (ref 0–6)
ETHANOL BLD-MCNC: < 10 MG/DL — SIGNIFICANT CHANGE UP
GLUCOSE SERPL-MCNC: 105 MG/DL — HIGH (ref 70–99)
HCT VFR BLD CALC: 47.6 % — SIGNIFICANT CHANGE UP (ref 39–50)
HGB BLD-MCNC: 16.6 G/DL — SIGNIFICANT CHANGE UP (ref 13–17)
IMM GRANULOCYTES # BLD AUTO: 0.04 # — SIGNIFICANT CHANGE UP
IMM GRANULOCYTES NFR BLD AUTO: 0.5 % — SIGNIFICANT CHANGE UP (ref 0–1.5)
LYMPHOCYTES # BLD AUTO: 1.87 K/UL — SIGNIFICANT CHANGE UP (ref 1–3.3)
LYMPHOCYTES # BLD AUTO: 24 % — SIGNIFICANT CHANGE UP (ref 13–44)
MCHC RBC-ENTMCNC: 33.9 PG — SIGNIFICANT CHANGE UP (ref 27–34)
MCHC RBC-ENTMCNC: 34.9 % — SIGNIFICANT CHANGE UP (ref 32–36)
MCV RBC AUTO: 97.1 FL — SIGNIFICANT CHANGE UP (ref 80–100)
MONOCYTES # BLD AUTO: 0.71 K/UL — SIGNIFICANT CHANGE UP (ref 0–0.9)
MONOCYTES NFR BLD AUTO: 9.1 % — SIGNIFICANT CHANGE UP (ref 2–14)
NEUTROPHILS # BLD AUTO: 5.02 K/UL — SIGNIFICANT CHANGE UP (ref 1.8–7.4)
NEUTROPHILS NFR BLD AUTO: 64.4 % — SIGNIFICANT CHANGE UP (ref 43–77)
NRBC # FLD: 0 — SIGNIFICANT CHANGE UP
PLATELET # BLD AUTO: 247 K/UL — SIGNIFICANT CHANGE UP (ref 150–400)
PMV BLD: 10.4 FL — SIGNIFICANT CHANGE UP (ref 7–13)
POTASSIUM SERPL-MCNC: 4.2 MMOL/L — SIGNIFICANT CHANGE UP (ref 3.5–5.3)
POTASSIUM SERPL-SCNC: 4.2 MMOL/L — SIGNIFICANT CHANGE UP (ref 3.5–5.3)
PROT SERPL-MCNC: 7.7 G/DL — SIGNIFICANT CHANGE UP (ref 6–8.3)
RBC # BLD: 4.9 M/UL — SIGNIFICANT CHANGE UP (ref 4.2–5.8)
RBC # FLD: 13 % — SIGNIFICANT CHANGE UP (ref 10.3–14.5)
SALICYLATES SERPL-MCNC: < 5 MG/DL — LOW (ref 15–30)
SODIUM SERPL-SCNC: 138 MMOL/L — SIGNIFICANT CHANGE UP (ref 135–145)
TSH SERPL-MCNC: 4.05 UIU/ML — SIGNIFICANT CHANGE UP (ref 0.27–4.2)
WBC # BLD: 7.8 K/UL — SIGNIFICANT CHANGE UP (ref 3.8–10.5)
WBC # FLD AUTO: 7.8 K/UL — SIGNIFICANT CHANGE UP (ref 3.8–10.5)

## 2018-05-16 PROCEDURE — 90792 PSYCH DIAG EVAL W/MED SRVCS: CPT

## 2018-05-16 PROCEDURE — 99283 EMERGENCY DEPT VISIT LOW MDM: CPT

## 2018-05-16 NOTE — ED BEHAVIORAL HEALTH ASSESSMENT NOTE - DIFFERENTIAL
personality disorder with dependent, borderline and narcissistic traits, r/o Autism spectrum d/o.  Mood disorder, Bipolar disorder by history

## 2018-05-16 NOTE — ED PROVIDER NOTE - OBJECTIVE STATEMENT
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  Denies fever/chills, cp, sob, n/v/d.  Was recently admitted last month to [facility].  Patient presents today requesting admission and ECT.  Denies SI/HI and hallucinations.

## 2018-05-16 NOTE — ED BEHAVIORAL HEALTH NOTE - BEHAVIORAL HEALTH NOTE
Worker met with patient to obtain collateral information. Patient reports that he does not have any close family members or friends for worker to call. Patient refused to provided any information for worker to utilize for collateral.

## 2018-05-16 NOTE — ED BEHAVIORAL HEALTH ASSESSMENT NOTE - HPI (INCLUDE ILLNESS QUALITY, SEVERITY, DURATION, TIMING, CONTEXT, MODIFYING FACTORS, ASSOCIATED SIGNS AND SYMPTOMS)
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 where he received ECT but subsequently didn't follow-up outpatient, SA by OD 5 years ago, noncompliant with outpatient care, no h/o legal/arrests/violence, no active substance abuse, no PMH, self-referred to ER c/o inability to function.    Per 4/4/18 [facility] discharge summary, "pt was regressed, needy, intrusive, loud with poor boundaries and impulse control, throughout hospitalization." "He consistently refused to consider starting medication, stating that no medication has ever helped him. He also did not want SW to make a referral at time of discharge, for after care, stating that he would find his own private psychiatrist in [place]. His personality disorder greatly undermined his ability to be accepting of substantive treatment and this should be considered in the future, if the patient returns, requesting inpatient hospitalization without acute safety concerns."     Today (5/16/18) in  ED, pt is regressed, needy, demanding. States he came to the ED today because "I can't function!" He states "I don't feel well mentally." When asked if he has been feeling depressed or anxious, he responds "I don't know, I can't function!" Pt alleges that for the past 2.5 weeks, he hasn't been able to sleep, eat, or bathe. Upon further questioning, pt states he is sleeping at least 5 hours per night, he is eating 3 small meals per day, and he is bathing 5 days per week. He denies suicidal ideation, intent, or plan. When writer pointed out that he has a history of not following up outpatient, including most recent hospitalization when he refused outpatient referrals, pt exclaims "that's not true! They (inpatient team) dismissed me!" He adds "you think you're a know-it-all, you don't know anything! I've had this condition for 50 years, and I know that medication doesn't work for me! Just be quiet and give me ECT!"   Pt denies homicidal or violent ideation, intent, or plan. He denies manic or psychotic symptoms. He denies substance use.  Pt states he has "nobody" in his life that ED team can speak with for collateral information.

## 2018-05-16 NOTE — ED ADULT NURSE REASSESSMENT NOTE - NS ED NURSE REASSESS COMMENT FT1
Patient presents anxious, denies S/I, pt placed in BH room 3, blood work and EKG done, needs met, pt currently awaiting further psych MD evaluation, will continue to monitor pt.

## 2018-05-16 NOTE — ED BEHAVIORAL HEALTH ASSESSMENT NOTE - OTHER PAST PSYCHIATRIC HISTORY (INCLUDE DETAILS REGARDING ONSET, COURSE OF ILLNESS, INPATIENT/OUTPATIENT TREATMENT)
multiple psych admissions, SA by OD 5 years ago, last admitted [facility] 3/23/18, received ECT, refused outpatient follow-up

## 2018-05-16 NOTE — ED ADULT TRIAGE NOTE - CHIEF COMPLAINT QUOTE
c/o increased depression, states he's unable to function, unable to sleep or eat, crying in traige, denies any SI or HI, no drugs or alcohol, calm and cooperative,  x 2.5 weeks PMH: depression, insomnia, BPH, TURP,

## 2018-05-16 NOTE — ED BEHAVIORAL HEALTH ASSESSMENT NOTE - SUMMARY
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    On further exploration, pt is in fact bathing, eating, and sleeping adequately. Pt denies suicidal ideation, intent, or plan. He denies homicidal or violent ideation, intent, or plan. He does not appear acutely depressed, he is not manic or psychotic. Pt exhibits prominent Axis II pathology. He continues to refuse outpatient referrals. Of note, at 4/30/18  ED visit, pt was provided with resources and referrals for outpatient ECT/psychiatric treatment.

## 2018-05-16 NOTE — ED ADULT NURSE REASSESSMENT NOTE - NS ED NURSE REASSESS COMMENT FT1
Patient medically cleared, pt in improved and stable condition, discharged as per MD Phillips order, discharge instructions given, pt verbalized understanding and left ER a&ox3.

## 2018-05-16 NOTE — ED BEHAVIORAL HEALTH ASSESSMENT NOTE - OTHER
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

## 2018-05-16 NOTE — ED BEHAVIORAL HEALTH ASSESSMENT NOTE - DESCRIPTION
needy, loud, demanding but remains in behavioral control; he did not require prn medications or restraints     Vital Signs Last 24 Hrs  T(C): 36.6 (16 May 2018 08:28), Max: 36.6 (16 May 2018 08:28)  T(F): 97.8 (16 May 2018 08:28), Max: 97.8 (16 May 2018 08:28)  HR: 76 (16 May 2018 08:28) (76 - 76)  BP: 158/101 (16 May 2018 08:28) (158/101 - 158/101)  BP(mean): --  RR: 16 (16 May 2018 08:28) (16 - 16)  SpO2: 100% (16 May 2018 08:28) (100% - 100%) prostate CA, melanoma lives alone, works from home, single and no kids

## 2018-05-17 DIAGNOSIS — F60.9 PERSONALITY DISORDER, UNSPECIFIED: ICD-10-CM

## 2018-05-17 DIAGNOSIS — F31.9 BIPOLAR DISORDER, UNSPECIFIED: ICD-10-CM

## 2018-05-17 DIAGNOSIS — N40.0 BENIGN PROSTATIC HYPERPLASIA WITHOUT LOWER URINARY TRACT SYMPTOMS: ICD-10-CM

## 2018-08-24 ENCOUNTER — EMERGENCY (EMERGENCY)
Facility: HOSPITAL | Age: 73
LOS: 1 days | Discharge: HOME | End: 2018-08-24
Attending: EMERGENCY MEDICINE
Payer: MEDICARE

## 2018-08-24 VITALS — DIASTOLIC BLOOD PRESSURE: 98 MMHG | SYSTOLIC BLOOD PRESSURE: 153 MMHG | HEART RATE: 81 BPM

## 2018-08-24 VITALS
TEMPERATURE: 98 F | HEART RATE: 57 BPM | RESPIRATION RATE: 18 BRPM | WEIGHT: 175.93 LBS | SYSTOLIC BLOOD PRESSURE: 167 MMHG | OXYGEN SATURATION: 99 % | HEIGHT: 70 IN | DIASTOLIC BLOOD PRESSURE: 96 MMHG

## 2018-08-24 PROBLEM — I10 ESSENTIAL (PRIMARY) HYPERTENSION: Chronic | Status: ACTIVE | Noted: 2017-06-16

## 2018-08-24 PROCEDURE — 99282 EMERGENCY DEPT VISIT SF MDM: CPT

## 2018-08-24 PROCEDURE — 99283 EMERGENCY DEPT VISIT LOW MDM: CPT

## 2018-08-24 NOTE — ED PROVIDER NOTE - MEDICAL DECISION MAKING DETAILS
Eleanor Grace MD - Attending Physician: Pt known to this facility, here wanting someone to talk to. Feels better after speaking with TERRY Hughes. Does not feel like he needs psych. Denies SI/HI, denies hallucinations, does not want admission. Clean, cared for, oriented and aware of his surroundings. Has had no SI or S attempts in past. No concern for harm to self or others. Follow-up outpatient

## 2018-08-24 NOTE — ED ADULT NURSE NOTE - NSIMPLEMENTINTERV_GEN_ALL_ED
Implemented All Universal Safety Interventions:  [place] to call system. Call bell, personal items and telephone within reach. Instruct patient to call for assistance. Room bathroom lighting operational. Non-slip footwear when patient is off stretcher. Physically safe environment: no spills, clutter or unnecessary equipment. Stretcher in lowest position, wheels locked, appropriate side rails in place.

## 2018-08-24 NOTE — ED ADULT NURSE NOTE - OBJECTIVE STATEMENT
72 year old male BIB self for decompensation. A&O; denies JIMMY; denies AVH; denies ETOH and drug use; denies Axis III. C/O "not functioning I can't take it anymore"; pt is well known to this writer, multiple prior hospitalizations with similar presentation, recent ECT at [facility] in April; endorses depression anxiety and hopelessness but states "I would never hurt myself I never hurt myself I just want to feel better"; upon arrival pt is loud and tearful but responding somewhat to support; pronounced personalty component with resistance to TX on the past. Security applied metal detection and took belongings, valuables sent to safe. Continue to monitor.

## 2018-08-24 NOTE — ED PROVIDER NOTE - OBJECTIVE STATEMENT
Pt history of bipolar/depression, long history of noncompliance. No prior SI/S attempts. Has received ECT 4/18. Presents today for "not functioning." Pt denies SI/HI, denies hallucinations, not currently under the care of psychiatry. He denies feeling unsafe at home. He admits to being here wanting someone to talk to. Wants to go home. Denies any acute concerns and does not want to see psychiatry as he feels safe and would "never do anything to myself." Denies medication overuse, alcohol, drug use.

## 2018-08-25 ENCOUNTER — EMERGENCY (EMERGENCY)
Facility: HOSPITAL | Age: 73
LOS: 1 days | Discharge: HOME | End: 2018-08-25
Attending: EMERGENCY MEDICINE
Payer: MEDICARE

## 2018-08-25 VITALS
SYSTOLIC BLOOD PRESSURE: 139 MMHG | DIASTOLIC BLOOD PRESSURE: 90 MMHG | HEART RATE: 98 BPM | RESPIRATION RATE: 17 BRPM | OXYGEN SATURATION: 98 %

## 2018-08-25 VITALS
HEART RATE: 92 BPM | OXYGEN SATURATION: 100 % | HEIGHT: 66 IN | SYSTOLIC BLOOD PRESSURE: 147 MMHG | TEMPERATURE: 98 F | DIASTOLIC BLOOD PRESSURE: 96 MMHG | WEIGHT: 175.93 LBS | RESPIRATION RATE: 16 BRPM

## 2018-08-25 DIAGNOSIS — F39 UNSPECIFIED MOOD [AFFECTIVE] DISORDER: ICD-10-CM

## 2018-08-25 DIAGNOSIS — F60.9 PERSONALITY DISORDER, UNSPECIFIED: ICD-10-CM

## 2018-08-25 LAB
ALBUMIN SERPL ELPH-MCNC: 5 G/DL — SIGNIFICANT CHANGE UP (ref 3.3–5)
ALP SERPL-CCNC: 85 U/L — SIGNIFICANT CHANGE UP (ref 40–120)
ALT FLD-CCNC: 22 U/L — SIGNIFICANT CHANGE UP (ref 10–45)
ANION GAP SERPL CALC-SCNC: 13 MMOL/L — SIGNIFICANT CHANGE UP (ref 5–17)
APAP SERPL-MCNC: <15 UG/ML — SIGNIFICANT CHANGE UP (ref 10–30)
AST SERPL-CCNC: 22 U/L — SIGNIFICANT CHANGE UP (ref 10–40)
BASOPHILS # BLD AUTO: 0.1 K/UL — SIGNIFICANT CHANGE UP (ref 0–0.2)
BASOPHILS NFR BLD AUTO: 0.8 % — SIGNIFICANT CHANGE UP (ref 0–2)
BILIRUB SERPL-MCNC: 1.3 MG/DL — HIGH (ref 0.2–1.2)
BUN SERPL-MCNC: 18 MG/DL — SIGNIFICANT CHANGE UP (ref 7–23)
CALCIUM SERPL-MCNC: 10.3 MG/DL — SIGNIFICANT CHANGE UP (ref 8.4–10.5)
CHLORIDE SERPL-SCNC: 103 MMOL/L — SIGNIFICANT CHANGE UP (ref 96–108)
CO2 SERPL-SCNC: 20 MMOL/L — LOW (ref 22–31)
CREAT SERPL-MCNC: 1.05 MG/DL — SIGNIFICANT CHANGE UP (ref 0.5–1.3)
EOSINOPHIL # BLD AUTO: 0.1 K/UL — SIGNIFICANT CHANGE UP (ref 0–0.5)
EOSINOPHIL NFR BLD AUTO: 1.2 % — SIGNIFICANT CHANGE UP (ref 0–6)
ETHANOL SERPL-MCNC: SIGNIFICANT CHANGE UP MG/DL (ref 0–10)
GLUCOSE SERPL-MCNC: 113 MG/DL — HIGH (ref 70–99)
HCT VFR BLD CALC: 48.7 % — SIGNIFICANT CHANGE UP (ref 39–50)
HGB BLD-MCNC: 16.6 G/DL — SIGNIFICANT CHANGE UP (ref 13–17)
LYMPHOCYTES # BLD AUTO: 1.6 K/UL — SIGNIFICANT CHANGE UP (ref 1–3.3)
LYMPHOCYTES # BLD AUTO: 20.3 % — SIGNIFICANT CHANGE UP (ref 13–44)
MCHC RBC-ENTMCNC: 33.1 PG — SIGNIFICANT CHANGE UP (ref 27–34)
MCHC RBC-ENTMCNC: 34.1 GM/DL — SIGNIFICANT CHANGE UP (ref 32–36)
MCV RBC AUTO: 97.1 FL — SIGNIFICANT CHANGE UP (ref 80–100)
MONOCYTES # BLD AUTO: 0.7 K/UL — SIGNIFICANT CHANGE UP (ref 0–0.9)
MONOCYTES NFR BLD AUTO: 8.7 % — SIGNIFICANT CHANGE UP (ref 2–14)
NEUTROPHILS # BLD AUTO: 5.4 K/UL — SIGNIFICANT CHANGE UP (ref 1.8–7.4)
NEUTROPHILS NFR BLD AUTO: 69 % — SIGNIFICANT CHANGE UP (ref 43–77)
PLATELET # BLD AUTO: 279 K/UL — SIGNIFICANT CHANGE UP (ref 150–400)
POTASSIUM SERPL-MCNC: 4.2 MMOL/L — SIGNIFICANT CHANGE UP (ref 3.5–5.3)
POTASSIUM SERPL-SCNC: 4.2 MMOL/L — SIGNIFICANT CHANGE UP (ref 3.5–5.3)
PROT SERPL-MCNC: 8.1 G/DL — SIGNIFICANT CHANGE UP (ref 6–8.3)
RBC # BLD: 5.02 M/UL — SIGNIFICANT CHANGE UP (ref 4.2–5.8)
RBC # FLD: 12.1 % — SIGNIFICANT CHANGE UP (ref 10.3–14.5)
SALICYLATES SERPL-MCNC: <2 MG/DL — LOW (ref 15–30)
SODIUM SERPL-SCNC: 136 MMOL/L — SIGNIFICANT CHANGE UP (ref 135–145)
WBC # BLD: 7.8 K/UL — SIGNIFICANT CHANGE UP (ref 3.8–10.5)
WBC # FLD AUTO: 7.8 K/UL — SIGNIFICANT CHANGE UP (ref 3.8–10.5)

## 2018-08-25 PROCEDURE — 99284 EMERGENCY DEPT VISIT MOD MDM: CPT | Mod: 25

## 2018-08-25 PROCEDURE — 80053 COMPREHEN METABOLIC PANEL: CPT

## 2018-08-25 PROCEDURE — 90792 PSYCH DIAG EVAL W/MED SRVCS: CPT | Mod: GC

## 2018-08-25 PROCEDURE — 93010 ELECTROCARDIOGRAM REPORT: CPT

## 2018-08-25 PROCEDURE — 99285 EMERGENCY DEPT VISIT HI MDM: CPT | Mod: 25

## 2018-08-25 PROCEDURE — 85027 COMPLETE CBC AUTOMATED: CPT

## 2018-08-25 PROCEDURE — 84443 ASSAY THYROID STIM HORMONE: CPT

## 2018-08-25 PROCEDURE — 93005 ELECTROCARDIOGRAM TRACING: CPT

## 2018-08-25 PROCEDURE — 80307 DRUG TEST PRSMV CHEM ANLYZR: CPT

## 2018-08-25 NOTE — ED BEHAVIORAL HEALTH ASSESSMENT NOTE - HPI (INCLUDE ILLNESS QUALITY, SEVERITY, DURATION, TIMING, CONTEXT, MODIFYING FACTORS, ASSOCIATED SIGNS AND SYMPTOMS)
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 , with PMH prostate cancer, Parkinson disease, past psychiatric history of personality disorder NOS (narcissistic and dependant traits), multiple psychiatric admissions for bipolar disorder, with last admission to [facility] in March/April 2018 during which he had 3 ECT treatments, multiple ED visits for depression, 1 previous SA by overdose (no known sequelae), no current psychiatric outpatient provider, self-presenting for "suffering from a very complicated illness."    When approached in ED room patient is laying down on bed with hand theatrically placed over his eyes, after introducing myself patient loudly states "I don't want to talk to you! I don't feel safe with you!" When I proceed to terminate interview patient states: "No! Come back! I'm so sorry I'll tell you anything you want!" Throughout interview patient cycles through apologetic and help-rejecting behavior, with extremely labile and superficial affect, frequent tearfulness that easily resolves with re-direction, raising his voice in one moment then becoming very quiet in the next. He is also intrusive and grandiose, and calls me 'the intellectual type,' and 'you're only a resident, you're not even a real doctor.'    Patient states that for the last 3 weeks, he has been feeling depressed and is "unable to function." He cannot identify any recent stressors and stated the feelings come out of the blue. He states "I can't eat, I can't sleep, I can't drive, I can't do anything." When questioned further however, patient reports he ate just now in the ER, was able to sleep through the night  yesterday evening, drove to the [employer] on Tuesday, and has been taking part in his normal hobbies and activities, including going to a dance recently and betting on the stock market. Patient denies suicidality, stating he does not want to die but in fact wants to feel better. Patient requests ECT and states he wants to be admitted to [facility], but in same sentence states that "nothing could help me, the doctors don't know what to do with me! I have a very complicated illness!"    Denies HI. Denies symptom of or history of bree, denies former or current substance use or abuse. Denies history of trauma. 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 , with PMH prostate cancer, Parkinson disease, past psychiatric history of personality disorder NOS (narcissistic and dependant traits), multiple psychiatric admissions for bipolar disorder, with last admission to [facility] in March/2018 during which he had 3 ECT treatments, multiple ED visits for depression, 1 previous SA by overdose (no known sequelae), history of 11 years in outpatient therapy with 2 therapists that , no current psychiatric outpatient provider, self-presenting for "suffering from a very complicated illness."    When approached in ED room patient is laying down on bed with hand theatrically placed over his eyes, after introducing myself patient loudly states "I don't want to talk to you! I don't feel safe with you!" When I proceed to terminate interview patient states: "No! Come back! I'm so sorry I'll tell you anything you want!" Throughout interview patient cycles through apologetic and help-rejecting behavior, with extremely labile and superficial affect, frequent tearfulness that easily resolves with re-direction, raising his voice in one moment then becoming very quiet in the next. He is also intrusive and grandiose, and calls me 'the intellectual type,' and 'you're only a resident, you're not even a real doctor.'    Patient states that for the last 3 weeks, he has been feeling depressed and is "unable to function." He cannot identify any recent stressors and stated the feelings come out of the blue. He states "I can't eat, I can't sleep, I can't drive, I can't do anything." When questioned further however, patient reports he ate just now in the ER, was able to sleep through the night  yesterday evening, drove to the [employer] on Tuesday, and has been taking part in his normal hobbies and activities, including going to a dance recently and betting on the stock market. He states that even though it's hard he is able to push through and get through his normal activities. Patient denies suicidality, stating he does not want to die but in fact wants to feel better. Patient requests ECT and states he wants to be admitted to [facility], but in same sentence states that "nothing could help me, the doctors don't know what to do with me! I have a very complicated illness!" When told that he could have ECT as an outpatient, the patient cries that he called the service, and was told he would have to wait 3 weeks for an appointment, and cannot wait that long.     Denies HI. Denies symptom of or history of bree, denies former or current substance use or abuse. Denies history of trauma. 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 , with PMH prostate cancer, Parkinson disease, past psychiatric history of personality disorder NOS (narcissistic and dependent traits), multiple psychiatric admissions for bipolar disorder, with last admission to [facility] in 2018 during which he had 3 ECT treatments, multiple ED visits for depression, 1 previous SA by overdose (no known sequelae), history of 11 years in outpatient therapy with 2 therapists that , no current psychiatric outpatient provider, self-presenting for "suffering from a very complicated illness."    When approached in ED room patient is laying down on bed with hand theatrically placed over his eyes, after introducing myself patient loudly states "I don't want to talk to you! I don't feel safe with you!" When I proceed to terminate interview patient states: "No! Come back! I'm so sorry I'll tell you anything you want!" Throughout interview patient cycles through apologetic and help-rejecting behavior, with extremely labile and superficial affect, frequent tearfulness that easily resolves with re-direction, raising his voice in one moment then becoming very quiet in the next. He is also intrusive and grandiose, and calls me 'the intellectual type,' and 'you're only a resident, you're not even a real doctor.'    Patient states that for the last 3 weeks, he has been feeling depressed and is "unable to function." He cannot identify any recent stressors and stated the feelings come out of the blue. He states "I can't eat, I can't sleep, I can't drive, I can't do anything." When questioned further however, patient reports he ate just now in the ER, was able to sleep through the night  yesterday evening, drove to the [employer] on Tuesday, and has been taking part in his normal hobbies and activities, including going to a dance recently and betting on the stock market. He states that even though it's hard he is able to push through and get through his normal activities. Patient denies suicidality, stating he does not want to die but in fact wants to feel better. Patient requests ECT and states he wants to be admitted to [facility], but in same sentence states that "nothing could help me, the doctors don't know what to do with me! I have a very complicated illness!" When told that he could have ECT as an outpatient, the patient cries that he called the service, and was told he would have to wait 3 weeks for an appointment, and cannot wait that long.     Denies HI. Denies symptom of or history of bree, denies former or current substance use or abuse. Denies history of trauma.

## 2018-08-25 NOTE — ED BEHAVIORAL HEALTH ASSESSMENT NOTE - SUMMARY
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 , with PMH prostate cancer, Parkinson disease, past psychiatric history of personality disorder NOS (narcissistic and dependant traits), multiple psychiatric admissions for bipolar disorder, with last admission to [facility] in March/April 2018 during which he had 3 ECT treatments, multiple ED visits for depression, 1 previous SA by overdose (no known sequelae), no current psychiatric outpatient provider, self-presenting for "suffering from a very complicated illness."    Patient does not meet criteria for inpatient hospitalization because he is not suicidal, homicidal, and is able to take care of ADLs. His presentation is most consistent with personality pathology, with histrionic, dependent, narcissitic traits. He may also have an underlying mood disorder on the bipolar spectrum, however, this seems less likely given history of multiple admissions with none for bree, and patient denies consistent symptoms. 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 in March/April 2018 during which he had 3 ECT treatments, multiple ED visits for depression, 1 previous SA by overdose (no known sequelae), no current psychiatric outpatient provider, self-presenting for "suffering from a very complicated illness."    Patient does not meet criteria for inpatient hospitalization because he is not suicidal, homicidal, and is able to take care of ADLs. His presentation is most consistent with personality pathology, with histrionic, dependent, narcissistic traits. He may also have an underlying mood disorder on the bipolar spectrum, however, this seems less likely given history of multiple admissions with none for bree, and patient denies consistent symptoms.

## 2018-08-25 NOTE — ED PROVIDER NOTE - OBJECTIVE STATEMENT
attending Mily: 72yM h/o bipolar, prior admissions to [facility] for depression, remote ECT treatments not currently following with psychiatrist or taking psych meds presents with depression and requesting psychiatric admission. Seen in ED yesterday for evaluation, reportedly didn't want to see psych and discharged after evaluation in ED. Not taking PO or sleeping. Feels his "life is out of control". +SI without plan. Denies HI.

## 2018-08-25 NOTE — ED ADULT NURSE NOTE - OBJECTIVE STATEMENT
72 year old male BIB self after being  discharged yesterday in this ER. A&O; denies JIMMY; denies AVH; denies ETOH and drug use; denies Axis III. C/O same compliant as yesterday stating he is "not functioning I and he can't take it anymore"",  pt is well known to this writer, multiple prior hospitalizations with similar presentation, recent ECT at [facility] in April; endorses depression anxiety and hopelessness but states "I would never hurt myself I never hurt myself I just want to feel better"; upon arrival pt is loud and tearful but responding somewhat to support; pronounced personalty component with resistance to TX on the past. Security applied metal detection and took belongings, valuables sent to safe. Continue to monitor.

## 2018-08-25 NOTE — ED BEHAVIORAL HEALTH ASSESSMENT NOTE - DESCRIPTION
Patient received ativan 1mg for anxiety an agitation melanoma, Prostate CA, Mik macario. lives alone, works from home, single and no kids

## 2018-08-25 NOTE — ED BEHAVIORAL HEALTH ASSESSMENT NOTE - OTHER PAST PSYCHIATRIC HISTORY (INCLUDE DETAILS REGARDING ONSET, COURSE OF ILLNESS, INPATIENT/OUTPATIENT TREATMENT)
multiple psych admissions, SA by OD 5 years ago, last admitted [facility] 3/23/18, received ECT, refused outpatient follow-up

## 2018-08-25 NOTE — ED BEHAVIORAL HEALTH ASSESSMENT NOTE - RISK ASSESSMENT
He is at chronically elevated risk of harm given severe personality pathology with lack of social supports, past psychiatric history of multiple admissions, and history of 1 suicide attempt. Protective factors include access to treatment, lack of substance use, future orientation. His current risk is elevated in the setting of mood lability, and he would benefit from outpatient therapy to modify this risk factor. 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 Protective factors include access to treatment, lack of substance use, future orientation. His risk is chronically elevated in the setting of mood lability, and he would benefit from outpatient therapy to modify this risk factor.

## 2018-08-25 NOTE — ED BEHAVIORAL HEALTH ASSESSMENT NOTE - CASE SUMMARY
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  Pt states he is unable to eat, sleep, or bathe, although later reports he ate in ER this morning, has been sleeping 6 hours, showering daily, and went to the grocery store and a dance recently.  Denies SIIP/HIIP, denies substance abuse or access to guns.  Denies manic or psychotic symptoms.  States "I have a complicated illness, no one can help me."  Wants to be admitted to inpatient psychiatry because he wants immediate ECT, although he does acknowledge that ECT has not been consistently helpful in the past, either.  Has not been seeing a therapist recently.  Pt is currently at low imminent risk given that he reports no active suicidal ideation, homicidal ideation, auditory/visual hallucinations or bree. Pt is however, chronic moderate risk 2/2 h/o treatment non-compliance and h/o SA.  Dx: Mood d/o NOS, personality d/o NOS.  Recs: 1. Pt does not meet criteria for inpatient psychiatric hospitalization at this time.  2. Was provided with outpatient psychiatry referral resource list as well as information for [facility].  3. Pt was made aware to call 911 or return to ER for imminent risk of harm to self/others.

## 2018-08-25 NOTE — ED PROVIDER NOTE - MEDICAL DECISION MAKING DETAILS
attending Mily: pt with longstanding history of depression with prior psych admissions and remote ECT presents requesting psych admission for worsening depression. Will obtain ekg, screening labs, psych eval and reassess

## 2018-08-25 NOTE — ED BEHAVIORAL HEALTH ASSESSMENT NOTE - ORAL MEDICATION DETAILS
Patient received ativan 1mg for anxiety an agitation Patient received ativan 1mg for anxiety and agitation

## 2018-08-25 NOTE — ED ADULT NURSE NOTE - NSIMPLEMENTINTERV_GEN_ALL_ED
Implemented All Universal Safety Interventions:  [place] to call system. Call bell, personal items and telephone within reach. Instruct patient to call for assistance. Room bathroom lighting operational. Non-slip footwear when patient is off stretcher. Physically safe environment: no spills, clutter or unnecessary equipment. Stretcher in lowest position, wheels locked, appropriate side rails in place.

## 2018-08-26 LAB — TSH SERPL-MCNC: 2.03 UIU/ML — SIGNIFICANT CHANGE UP (ref 0.27–4.2)

## 2018-10-05 NOTE — ED ADULT TRIAGE NOTE - MODE OF ARRIVAL
EMS
Implemented All Universal Safety Interventions:  [place] to call system. Call bell, personal items and telephone within reach. Instruct patient to call for assistance. Room bathroom lighting operational. Non-slip footwear when patient is off stretcher. Physically safe environment: no spills, clutter or unnecessary equipment. Stretcher in lowest position, wheels locked, appropriate side rails in place.

## 2018-10-23 NOTE — ED PROVIDER NOTE - NORMAL, MLM
2 RN skin check complete. 2 scabs noted on R ear. Pt has genrealized dryness. Pt has back acne that he states itches. Non blanchable area noted on R buttocks. Pressure ulcer with mepilex noted on R hip.   Small pressure ulcer O@A noted on L hip. R foot has large blister on inner foot and pressure ulcer on R pinky. Pressure ulcer noted on L foot.     macrnia all pertinent systems normal

## 2019-01-01 NOTE — ED ADULT NURSE NOTE - RN DISCHARGE SIGNATURE
Chief Complaint   Patient presents with     Ent Problem     Here for tongue tie evaluation. Mother and father present       Wt 24 lb (10.9 kg)     Job Cordon LPN  
30-Apr-2018

## 2019-10-04 NOTE — ED BEHAVIORAL HEALTH ASSESSMENT NOTE - AFFECT CONGRUENCE
[FreeTextEntry1] : tolerated hpv and ipv vaccines without adverse effects\par vis and consent given for flu shot\par rtc 10/2 for hep b #3
Congruent

## 2021-03-15 NOTE — ED BEHAVIORAL HEALTH ASSESSMENT NOTE - AXIS III
[facility] () Dr [place] [facility] Parkinson disease, prostate cancer

## 2021-09-27 NOTE — ED ADULT TRIAGE NOTE - LOCATION:
Right arm; Cephalexin Pregnancy And Lactation Text: This medication is Pregnancy Category B and considered safe during pregnancy.  It is also excreted in breast milk but can be used safely for shorter doses.

## 2022-02-27 ENCOUNTER — EMERGENCY (EMERGENCY)
Facility: HOSPITAL | Age: 77
LOS: 1 days | Discharge: HOME | End: 2022-02-27
Attending: STUDENT IN AN ORGANIZED HEALTH CARE EDUCATION/TRAINING PROGRAM
Payer: MEDICARE

## 2022-02-27 VITALS — HEIGHT: 66 IN

## 2022-02-27 LAB
ALBUMIN SERPL ELPH-MCNC: 4.7 G/DL — SIGNIFICANT CHANGE UP (ref 3.3–5)
ALP SERPL-CCNC: 88 U/L — SIGNIFICANT CHANGE UP (ref 40–120)
ALT FLD-CCNC: 16 U/L — SIGNIFICANT CHANGE UP (ref 10–45)
ANION GAP SERPL CALC-SCNC: 14 MMOL/L — SIGNIFICANT CHANGE UP (ref 5–17)
APAP SERPL-MCNC: <15 UG/ML — SIGNIFICANT CHANGE UP (ref 10–30)
AST SERPL-CCNC: 14 U/L — SIGNIFICANT CHANGE UP (ref 10–40)
BASOPHILS # BLD AUTO: 0.05 K/UL — SIGNIFICANT CHANGE UP (ref 0–0.2)
BASOPHILS NFR BLD AUTO: 0.6 % — SIGNIFICANT CHANGE UP (ref 0–2)
BILIRUB SERPL-MCNC: 0.9 MG/DL — SIGNIFICANT CHANGE UP (ref 0.2–1.2)
BUN SERPL-MCNC: 17 MG/DL — SIGNIFICANT CHANGE UP (ref 7–23)
CALCIUM SERPL-MCNC: 10.2 MG/DL — SIGNIFICANT CHANGE UP (ref 8.4–10.5)
CHLORIDE SERPL-SCNC: 105 MMOL/L — SIGNIFICANT CHANGE UP (ref 96–108)
CO2 SERPL-SCNC: 22 MMOL/L — SIGNIFICANT CHANGE UP (ref 22–31)
COVID-19 SPIKE DOMAIN AB INTERP: POSITIVE
COVID-19 SPIKE DOMAIN ANTIBODY RESULT: >250 U/ML — HIGH
CREAT SERPL-MCNC: 0.97 MG/DL — SIGNIFICANT CHANGE UP (ref 0.5–1.3)
EOSINOPHIL # BLD AUTO: 0.16 K/UL — SIGNIFICANT CHANGE UP (ref 0–0.5)
EOSINOPHIL NFR BLD AUTO: 2 % — SIGNIFICANT CHANGE UP (ref 0–6)
ETHANOL SERPL-MCNC: SIGNIFICANT CHANGE UP MG/DL (ref 0–10)
GLUCOSE SERPL-MCNC: 134 MG/DL — HIGH (ref 70–99)
HCT VFR BLD CALC: 45.5 % — SIGNIFICANT CHANGE UP (ref 39–50)
HGB BLD-MCNC: 15.6 G/DL — SIGNIFICANT CHANGE UP (ref 13–17)
IMM GRANULOCYTES NFR BLD AUTO: 0.2 % — SIGNIFICANT CHANGE UP (ref 0–1.5)
LYMPHOCYTES # BLD AUTO: 1.8 K/UL — SIGNIFICANT CHANGE UP (ref 1–3.3)
LYMPHOCYTES # BLD AUTO: 22.2 % — SIGNIFICANT CHANGE UP (ref 13–44)
MCHC RBC-ENTMCNC: 33.9 PG — SIGNIFICANT CHANGE UP (ref 27–34)
MCHC RBC-ENTMCNC: 34.3 GM/DL — SIGNIFICANT CHANGE UP (ref 32–36)
MCV RBC AUTO: 98.9 FL — SIGNIFICANT CHANGE UP (ref 80–100)
MONOCYTES # BLD AUTO: 0.73 K/UL — SIGNIFICANT CHANGE UP (ref 0–0.9)
MONOCYTES NFR BLD AUTO: 9 % — SIGNIFICANT CHANGE UP (ref 2–14)
NEUTROPHILS # BLD AUTO: 5.34 K/UL — SIGNIFICANT CHANGE UP (ref 1.8–7.4)
NEUTROPHILS NFR BLD AUTO: 66 % — SIGNIFICANT CHANGE UP (ref 43–77)
NRBC # BLD: 0 /100 WBCS — SIGNIFICANT CHANGE UP (ref 0–0)
PLATELET # BLD AUTO: 257 K/UL — SIGNIFICANT CHANGE UP (ref 150–400)
POTASSIUM SERPL-MCNC: 4 MMOL/L — SIGNIFICANT CHANGE UP (ref 3.5–5.3)
POTASSIUM SERPL-SCNC: 4 MMOL/L — SIGNIFICANT CHANGE UP (ref 3.5–5.3)
PROT SERPL-MCNC: 7.5 G/DL — SIGNIFICANT CHANGE UP (ref 6–8.3)
RBC # BLD: 4.6 M/UL — SIGNIFICANT CHANGE UP (ref 4.2–5.8)
RBC # FLD: 12.7 % — SIGNIFICANT CHANGE UP (ref 10.3–14.5)
SALICYLATES SERPL-MCNC: <2 MG/DL — LOW (ref 15–30)
SARS-COV-2 IGG+IGM SERPL QL IA: >250 U/ML — HIGH
SARS-COV-2 IGG+IGM SERPL QL IA: POSITIVE
SODIUM SERPL-SCNC: 141 MMOL/L — SIGNIFICANT CHANGE UP (ref 135–145)
WBC # BLD: 8.1 K/UL — SIGNIFICANT CHANGE UP (ref 3.8–10.5)
WBC # FLD AUTO: 8.1 K/UL — SIGNIFICANT CHANGE UP (ref 3.8–10.5)

## 2022-02-27 PROCEDURE — 85025 COMPLETE CBC W/AUTO DIFF WBC: CPT

## 2022-02-27 PROCEDURE — 80307 DRUG TEST PRSMV CHEM ANLYZR: CPT

## 2022-02-27 PROCEDURE — 84443 ASSAY THYROID STIM HORMONE: CPT

## 2022-02-27 PROCEDURE — U0003: CPT

## 2022-02-27 PROCEDURE — 99285 EMERGENCY DEPT VISIT HI MDM: CPT

## 2022-02-27 PROCEDURE — 80053 COMPREHEN METABOLIC PANEL: CPT

## 2022-02-27 PROCEDURE — 93005 ELECTROCARDIOGRAM TRACING: CPT

## 2022-02-27 PROCEDURE — U0005: CPT

## 2022-02-27 PROCEDURE — 86769 SARS-COV-2 COVID-19 ANTIBODY: CPT

## 2022-02-27 NOTE — ED BEHAVIORAL HEALTH ASSESSMENT NOTE - REFERRAL / APPOINTMENT DETAILS
gave patient outpatient referrals list as well as phone number for therapists [facility] information provided

## 2022-02-27 NOTE — ED PROVIDER NOTE - OBJECTIVE STATEMENT
77yo M Hx of bipolar disorder, personality disorder with past ECT treatment and psychiatric admissions presenting to the ED for psychiatric eval. pt called EMS stating that he didn't feel well mentally. currently not following with a psychiatrist and taking no medications. states that he has been struggling with his illness on and off for 50 years. has not been able to function mentally for the past 2 weeks, lives alone and states that he has no family. per pt he has been eating and drinking and able to care for himself but that he is not able to function mentally. denies any visual or auditory hallucinations. denies SI/HI. denies alcohol or drug use. wants to see a psychiatrist. pt extremely labile, alternating between crying and laughing throughout exam. he denies any physical complaints at this time.

## 2022-02-27 NOTE — ED ADULT NURSE NOTE - OBJECTIVE STATEMENT
75 y/o male bib ems/police from home, pt. called 911 since he needs help,  " I have mental breakdown". pt. has multiple inpt psych admissions, unable to state the medications for his bipolar, admit to have suicide attempt in the past, but denies any current s/hi, no a/v hallucinations or delusions. pt. is labile, easily distracted and w/ flight of ideas. pt. has been noncompliant w/ current medications or treatment.

## 2022-02-27 NOTE — ED BEHAVIORAL HEALTH ASSESSMENT NOTE - SUMMARY
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 , with PMH prostate cancer, Parkinson disease, past psychiatric history of personality disorder NOS (narcissistic and dependant traits), multiple psychiatric admissions for bipolar disorder, with last admission to [facility] in March/April 2018 during which he had 3 ECT treatments, multiple ED visits for depression, 1 previous SA by overdose (no known sequelae), no current psychiatric outpatient provider, self-presenting for "suffering from a very complicated illness."    Patient does not meet criteria for inpatient hospitalization because he is not suicidal, homicidal, and is able to take care of ADLs. His presentation is most consistent with personality pathology, with histrionic, dependent, narcissistic traits. He may also have an underlying mood disorder on the bipolar spectrum, however, this seems less likely given history of multiple admissions with none for bree, and patient denies consistent symptoms. 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 On evaluation, patient with considerable lability and initial irritability, though both of these are fairly redirectable with firm structure to interview. Patient relays vague complaints of feeling unlike himself, though is without clear depressive/hypo/manic/psychotic symptoms, acute safety concerns noted/reported (SI, SIB, or HI/VI), and is attending to ADLs. As such, he does not meet criteria for inpatient psychiatric hospitalization. His presentation is most consistent with personality pathology, with histrionic, dependent, narcissistic traits. He may also have an underlying mood disorder on the bipolar spectrum, however, this seems less likely given history of multiple admissions with none for bree, and patient denies consistent symptoms. He is amenable to returning home with resources for outpatient follow-up, and as such, it is recommended he be discharged with o/p resources.

## 2022-02-27 NOTE — ED PROVIDER NOTE - PATIENT PORTAL LINK FT
You can access the FollowMyHealth Patient Portal offered by [facility] by registering at the following website: [email or web address]. By joining [employer]’s FollowMyHealth portal, you will also be able to view your health information using other applications (apps) compatible with our system.

## 2022-02-27 NOTE — ED PROVIDER NOTE - ATTENDING CONTRIBUTION TO CARE
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 Pt was bibems after pt called 911 due to his symptoms of "not feeling well" No cp, no sob, no nausea no vomiting, denies any home medications, no auditory nor visual hallucinations, no homicidal/suicidal ideation. pt denies following w/ outpt psychiatry. On exam, pt is awake and alert in no distress he has labile mood intermittently screaming and then is crying and sobbing in the room, he has clear lungs soft abdomen, no lower extremity edema. pt w/ labile mood. able to be verbally descalated, pt concerned about cost of the visit and then intermittently will sob and scream. Concern for acute bree. Plan for labs psych consult.

## 2022-02-27 NOTE — ED BEHAVIORAL HEALTH ASSESSMENT NOTE - DIFFERENTIAL
personality disorder NOS  r/o persistent depressive disorder  r/o bipolar II disorder Attending Only Personality disorder NOS  r/o persistent depressive disorder vs BP2D

## 2022-02-27 NOTE — ED PROVIDER NOTE - NSICDXPASTMEDICALHX_GEN_ALL_CORE_FT
PAST MEDICAL HISTORY:  BPH (Benign Prostatic Hypertrophy)     Depression     Environmental Allergies     HTN (hypertension)     Insomnia

## 2022-02-27 NOTE — ED BEHAVIORAL HEALTH ASSESSMENT NOTE - DESCRIPTION
melanoma, Prostate cca, Parkinson dz Irritable but cooperative    Vital Signs Last 24 Hrs  T(C): 36.6 (27 Feb 2022 19:30), Max: 36.6 (27 Feb 2022 19:30)  T(F): 97.9 (27 Feb 2022 19:30), Max: 97.9 (27 Feb 2022 19:30)  HR: 98 (27 Feb 2022 19:30) (98 - 98)  BP: 174/96 (27 Feb 2022 19:30) (174/96 - 174/96)  BP(mean): --  RR: 20 (27 Feb 2022 19:30) (20 - 20)  SpO2: 97% (27 Feb 2022 19:30) (97% - 97%) lives alone, works from home, single and no kids

## 2022-02-27 NOTE — ED PROVIDER NOTE - PROGRESS NOTE DETAILS
psychiatry consulted and aware of patient psych currently assessing the patient elliot longo pgy3: pt repeatedly stating that he wants to go home. spoke with psychiatry, pt not meeting with criteria at this time for inpatient psychiatric admission at this time. His presentation is most consistent with personality pathology, with histrionic, dependent, narcissistic traits. states that the patient had been amenable to discharge home with outpatient resources. security called to bring belongings.

## 2022-02-27 NOTE — ED PROVIDER NOTE - CLINICAL SUMMARY MEDICAL DECISION MAKING FREE TEXT BOX
77yo M with Hx of bipolar disorder, personality disorder, Hx of several psychiatric admissions and ECT treatment, on no medications and not following with a psychiatrist presenting for psych eval. not feeling well mentally for the past 2 weeks. reports being able to care for himself but unable to function mentally. pt with labile behavior, alternating between crying and laughing throughout exam, rapid, loud and pressured speech. no physical complaints at this time. will obtain labs, ekg and psych eval.

## 2022-02-27 NOTE — ED BEHAVIORAL HEALTH ASSESSMENT NOTE - CASE SUMMARY
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 in 2018 during which he had 3 ECT treatments, 1 previous SA by overdose (, no known sequelae), history of 11 years in outpatient therapy with 2 therapists that , no current psychiatric outpatient treatment, brought in by EMS activated by self for bipolar disorder/bree. On evaluation, patient with considerable lability and initial irritability, though both of these are fairly redirectable with firm structure to interview. Patient relays vague complaints of feeling unlike himself, though is without clear depressive/hypo/manic/psychotic symptoms, acute safety concerns noted/reported (SI, SIB, or HI/VI), and is attending to ADLs. As such, he does not meet criteria for inpatient psychiatric hospitalization. His presentation is most consistent with personality pathology, with histrionic, dependent, narcissistic traits. He may also have an underlying mood disorder on the bipolar spectrum, however, this seems less likely given history of multiple admissions with none for bree, and patient denies consistent symptoms. He is amenable to returning home with resources for outpatient follow-up, and as such, it is recommended he be discharged with o/p resources.

## 2022-02-27 NOTE — ED ADULT NURSE NOTE - ACTIVATING EVENTS/STRESSORS
Triggering events leading to humiliation, shame, and/or despair (e.g. Loss of relationship, financial or health status) (real or anticipated)/Non-compliant or not receiving treatment/Inadequate social supports

## 2022-02-27 NOTE — ED BEHAVIORAL HEALTH ASSESSMENT NOTE - HPI (INCLUDE ILLNESS QUALITY, SEVERITY, DURATION, TIMING, CONTEXT, MODIFYING FACTORS, ASSOCIATED SIGNS AND SYMPTOMS)
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    On interview, patient initially yells at writer, "why aren't you here, I don't want to talk to you!" and turns away from video. Shortly thereafter, he turns back around and pleads with writer, "I need help for my incurable illness!" Patient initially displays poor impulse control during questioning though later is able to answer questions directly when writer maintains firm structure in interview. Patient states he self-activated EMS due to "[not feeling] like myself" for the last several weeks, denying issues with mood/psychosis/ADLs, but rather (points to own head) "up here!" Patient states he needs help, denying currently being in treatment but stating he wants to start seeing a psychiatrist "because of this problem I've been dealing with for 50 years!" and noting specifically "I don't want a therapist, I want a psychiatrist!" Patient states he has not been doing "chores" around the house but is unable to identify which specific tasks when asked; he additionally denies difficulty with eating (states he is eating regular meals), sleeping (avg 5h/night, unchanged from baseline), showering/hygiene (performing tasks daily), or working (states he still works). Patient denies low mood/anhedonia, elated mood or other hypo/manic symptoms including FOI, distractibility, racing thoughts, impulsivity, reckless behaviors, grandiosity, AVH, paranoia, SIIP, NSSIB urges, or HI/VI. He states he would be open to restarting psychiatric care in the outpatient setting, though notes "I want someone who I can see in person!" Patient denies feeling unsafe returning home.

## 2022-02-27 NOTE — ED BEHAVIORAL HEALTH NOTE - BEHAVIORAL HEALTH NOTE
===================      PRE-HOSPITAL COURSE      ===================      SOURCE:  Secondhand EMR documentation.       DETAILS:  Patient BIBEMS; chief complaint of psychiatric evaluation.     ============      ED COURSE:      ============      SOURCE:  RN Note/secondhand EMR documentation.       ARRIVAL:  Patient was cooperative with hospital protocol and allowed for gowning/wanding without incident. Patient presents with fair hygiene/grooming. Patient placed on  1:1 In private room for consult.       BELONGINGS:  None notable.      BEHAVIOR: Blood/urine provided for routine labs without noted incident. Patient denies SI/HI/AH/VH. Patient is AOx4 and does make eye contact; speech of loud volume/ quickened rate accompanied by an illogical/abnormal thought process. Presents with poor judgement/insight to illness and current events. Patient observed to be laughing and crying while in ED, presenting as anxious. Patient has been sitting while in hospital bed.      TREATMENT: Patient did not require medication intervention while in ED; has been in behavioral control.      VISITORS:  Patient presently unaccompanied by social supports while in ED.            COVID Exposure Screen- collateral (i.e. third-party, chart review, belongings, etc; include EMS and ED staff)     1. *Has the patient been tested for COVID-19 in the last 90 days? ( ) Yes (X) No (  ) Unknown- Reason: _____      IF YES PROCEED TO QUESTION #2. IF NO OR UNKNOWN, PLEASE SKIP TO QUESTION #3.      2. Date of test(s), type of test(s), result(s) for ALL tests in last 90 days: _______     3. *Has the patient received a COVID-19 vaccine? ( X Yes ( ) No ( ) Unknown- Reason: _____      IF YES PROCEED TO QUESTION #4. IF NO or UNKNOWN, PLEASE SKIP TO QUESTION #7.      4. Moderna ( ) Pfizer (X) J&J ( )       5. Number of doses: ____3____      6. Date of last dose: ________      7. *In the past 10 days, has the patient been around anyone with a positive COVID-19 test?* ( ) Yes (X) No ( ) Unknown- Reason: ____      IF YES PLEASE ANSWER THE FOLLOWING QUESTIONS:      8. Was the patient within 6 feet of them for at least 15 minutes? ( ) Yes ( ) No ( ) Unknown- Reason: _____      9. Did the patient provide care for them? ( ) Yes ( ) No ( ) Unknown- Reason: ______      10. Did the patient have direct physical contact with them (touched, hugged, or kissed them)? ( ) Yes ( ) No ( ) Unknown- Reason: __      11. Did the patient share eating or drinking utensils with them? ( ) Yes ( ) No ( ) Unknown- Reason: ____      12. Did they sneeze, cough, or somehow get respiratory droplets on the patient? ( ) Yes ( ) No ( ) Unknown- Reason: ______ ===================      PRE-HOSPITAL COURSE      ===================      SOURCE:  Secondhand EMR documentation.       DETAILS:  Patient BIBEMS; chief complaint of psychiatric evaluation.     ============      ED COURSE:      ============      SOURCE:  RN Note/secondhand EMR documentation.       ARRIVAL:  Patient was cooperative with hospital protocol and allowed for gowning/wanding without incident. Patient presents with fair hygiene/grooming. Patient placed on  1:1 In private room for consult.       BELONGINGS:  None notable.      BEHAVIOR: Blood/urine provided for routine labs without noted incident. Patient denies SI/HI/AH/VH. Patient is AOx4 and does make eye contact; speech of loud volume/ quickened rate accompanied by an illogical/abnormal thought process. Presents with poor judgement/insight to illness and current events. Patient observed to be laughing and crying while in ED, presenting as anxious. Patient has been sitting while in hospital bed.      TREATMENT: Patient did not require medication intervention while in ED; has been in behavioral control.      VISITORS:  Patient presently unaccompanied by social supports while in ED.             COVID Exposure Screen- collateral (i.e. third-party, chart review, belongings, etc; include EMS and ED staff)     1. *Has the patient been tested for COVID-19 in the last 90 days? ( ) Yes (X) No (  ) Unknown- Reason: _____      IF YES PROCEED TO QUESTION #2. IF NO OR UNKNOWN, PLEASE SKIP TO QUESTION #3.      2. Date of test(s), type of test(s), result(s) for ALL tests in last 90 days: _______     3. *Has the patient received a COVID-19 vaccine? ( X Yes ( ) No ( ) Unknown- Reason: _____      IF YES PROCEED TO QUESTION #4. IF NO or UNKNOWN, PLEASE SKIP TO QUESTION #7.      4. Moderna ( ) Pfizer (X) J&J ( )       5. Number of doses: ____3____      6. Date of last dose: ________      7. *In the past 10 days, has the patient been around anyone with a positive COVID-19 test?* ( ) Yes (X) No ( ) Unknown- Reason: ____      IF YES PLEASE ANSWER THE FOLLOWING QUESTIONS:      8. Was the patient within 6 feet of them for at least 15 minutes? ( ) Yes ( ) No ( ) Unknown- Reason: _____      9. Did the patient provide care for them? ( ) Yes ( ) No ( ) Unknown- Reason: ______      10. Did the patient have direct physical contact with them (touched, hugged, or kissed them)? ( ) Yes ( ) No ( ) Unknown- Reason: __      11. Did the patient share eating or drinking utensils with them? ( ) Yes ( ) No ( ) Unknown- Reason: ____      12. Did they sneeze, cough, or somehow get respiratory droplets on the patient? ( ) Yes ( ) No ( ) Unknown- Reason: ______

## 2022-02-27 NOTE — ED PROVIDER NOTE - NSFOLLOWUPINSTRUCTIONS_ED_ALL_ED_FT
No signs of emergency medical condition on today's workup.  Presumptive diagnosis made, but further evaluation may be required by your primary care doctor or specialist for a definitive diagnosis.  Therefore, follow up as directed and if symptoms change/worsen or any emergency conditions, please return to the ER.   you were seen in the ED today for psychiatric evaluation.   you had blood work with no emergent findings.   you were seen by a psychiatrist and do not need an inpatient admission at this time.   you were given outpatient resources, please follow up.   continue taking any prescribed medications.   return to the emergency department for any new or worsening symptoms.

## 2022-02-27 NOTE — ED BEHAVIORAL HEALTH ASSESSMENT NOTE - RISK ASSESSMENT
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 Protective factors include access to treatment, lack of substance use, future orientation. His risk is chronically elevated in the setting of mood lability, and he would benefit from outpatient therapy to modify this risk factor. He is at chronically elevated risk of harm to self given personality pathology with lack of social supports, past psychiatric history of multiple admissions, and history of 1 suicide attempt (remote). Modifiable risk factors include not currently being in treatment, present albeit vague MH complaints, and poor therapeutic relationships. Protective factors include access to treatment, lack of substance use, future orientation. His risk is chronically elevated in the setting of mood lability, and he would benefit from outpatient therapy to modify this risk factor. Low Acute Suicide Risk

## 2022-02-27 NOTE — ED BEHAVIORAL HEALTH ASSESSMENT NOTE - OTHER PAST PSYCHIATRIC HISTORY (INCLUDE DETAILS REGARDING ONSET, COURSE OF ILLNESS, INPATIENT/OUTPATIENT TREATMENT)
Multiple psych admissions (9+), SA by OD in 2013, last admitted [facility] 3/23/18, received ECT, refused outpatient follow-up

## 2022-02-28 VITALS
TEMPERATURE: 98 F | OXYGEN SATURATION: 98 % | DIASTOLIC BLOOD PRESSURE: 87 MMHG | RESPIRATION RATE: 20 BRPM | HEART RATE: 68 BPM | SYSTOLIC BLOOD PRESSURE: 152 MMHG

## 2022-02-28 LAB
SARS-COV-2 RNA SPEC QL NAA+PROBE: SIGNIFICANT CHANGE UP
TSH SERPL-MCNC: 4.77 UIU/ML — HIGH (ref 0.27–4.2)

## 2022-03-01 NOTE — ED POST DISCHARGE NOTE - DETAILS
3/1/22: left vm for c/b. -Katlyn Eugene PA-C 3/2/22: No answer, left voice message for pt to call back admin line. - Olivier Powell PA-C 3/3: 3rd attempt without success. Will send certified letter - Josie Botello PA-C

## 2022-03-23 NOTE — ED BEHAVIORAL HEALTH ASSESSMENT NOTE - GAF
30 Interpolation Flap Text: A decision was made to reconstruct the defect utilizing an interpolation axial flap and a staged reconstruction.  A telfa template was made of the defect.  This telfa template was then used to outline the interpolation flap.  The donor area for the pedicle flap was then injected with anesthesia.  The flap was excised through the skin and subcutaneous tissue down to the layer of the underlying musculature.  The interpolation flap was carefully excised within this deep plane to maintain its blood supply.  The edges of the donor site were undermined.   The donor site was closed in a primary fashion.  The pedicle was then rotated into position and sutured.  Once the tube was sutured into place, adequate blood supply was confirmed with blanching and refill.  The pedicle was then wrapped with xeroform gauze and dressed appropriately with a telfa and gauze bandage to ensure continued blood supply and protect the attached pedicle.

## 2022-06-09 ENCOUNTER — NON-APPOINTMENT (OUTPATIENT)
Age: 77
End: 2022-06-09

## 2022-07-23 NOTE — ED BEHAVIORAL HEALTH ASSESSMENT NOTE - NS ED [facility] MED ROS GASTROINTESTINAL
HA


Aspirin (Aspirin 81 Mg Tab Chew)   mg PO DAILY MARIAH


Carvedilol (Carvedilol 12.5 Mg Tab)  12.5 mg PO BID MARIAH


Docusate Sodium (Docusate Sodium 100 Mg Cap)  100 mg PO BID MARIAH


Famotidine (Famotidine 20 Mg Tab)  20 mg PO DAILY MARIAH


Furosemide (Furosemide 40 Mg/4 Ml Inj)  40 mg IV BID MARIAH


Levothyroxine Sodium (Levothyroxine 100 Mcg Tab)   mcg PO DAILY MARIAH


Losartan Potassium (Losartan 25 Mg Tab)  25 mg PO QDAY MARIAH


Ondansetron HCl (Ondansetron 4 Mg/2 Ml Inj)  4 mg IV Q8H PRN


   PRN Reason: Nausea And Vomiting


Sodium Chloride (Sodium Chloride 0.9% 10 Ml Flush Syringe)  10 ml IV PRN PRN


   PRN Reason: LINE FLUSH


Tramadol HCl (Tramadol 50 Mg Tab)  50 mg PO Q6HR PRN


   PRN Reason: PAIN











Exam





- Physical Exam


Narrative exam: 





 Patient was on Ventimask and getting 10 L of oxygen.


 The patient appeared well nourished and normally developed.


 Vital signs as documented.


 Head exam is unremarkable.


 No scleral icterus .


 Neck is without jugular venous distension, thyromegaly, or carotid bruits. 


 Lungs decreased air entry on the bibasilar area


Cardiac exam reveals regular rate and  Rhythm. 


Abdominal exam reveals normal bowel sounds, nontender, no organomegaly.


Extremities are nonedematous and both femoral and pedal pulses are normal.


CNS: 


 


Mono % (


 


Mono # (
No complaints

## 2022-08-08 ENCOUNTER — NON-APPOINTMENT (OUTPATIENT)
Age: 77
End: 2022-08-08

## 2022-12-05 NOTE — ED ADULT NURSE NOTE - PT NEEDS ASSIST
[facility] and [address],  Sports Performance and Rehabilitation, [facility] [address]196 [address]  [address] [facility],5[address] Floor   Carlo Perez  Phone: [phone]  Fax: [phone]       Physical Therapy Treatment Note/ Progress Report:           Date:  2022    Patient Name:  Joellen Vázquez    :  1956  MRN: [id number]  Restrictions/Precautions:    Medical/Treatment Diagnosis Information:     Left shoulder pain - m25.512      Insurance/Certification information:     Physician Information:     Has the plan of care been signed (Y/N):        []  Yes  [x]  No     Date of Patient follow up with Physician:       Is this a Progress Report:     []  Yes  [x]  No        If Yes:  Date Range for reporting period:  Beginning  Ending    Progress report will be due (10 Rx or 30 days whichever is less):        Recertification will be due (POC Duration  / 90 days whichever is less):          Visit # Insurance Allowable Auth Required   16  []  Yes []  No        Functional Scale:     Date assessed:        Latex Allergy:  [x]NO      []YES  Preferred Language for Healthcare:   [x]English       []other:      Pain level:  0/10     SUBJECTIVE:  \"feeling good\"  -  OBJECTIVE:    Observation:   4/5 safe zone   Test measurements:      RESTRICTIONS/PRECAUTIONS:     Exercises/Interventions:       Therapeutic Ex (00656) Sets/sec Reps Notes/CUES               Rows/ext's    30x/25x  Blue tb   Bicep stretch   8 x 30 sec In supine/PT assist   Supine punches/alphabet/rhyth stab  30x/2x/5 x 20\"    Bicep curl  X 30 2 lb   Tricep extensions   X 30 1#   Wall walk   5 x 10\"    Supine elevations   25x mr    Pulleys   3'    Sidelying er   25x    Prone: rows, ext, m trap    25x, 25x     Standing elevations     Wall alphabet   25x 1#    2x     Manual Intervention (45718)      Prom/stm   15' Therapeutic Exercise and NMR EXR  [x] (61976) Provided verbal/tactile cueing for activities related to strengthening, flexibility, endurance, ROM for improvements in LE, proximal hip, and core control with self care, mobility, lifting, ambulation. [x] (04585) Provided verbal/tactile cueing for activities related to improving balance, coordination, kinesthetic sense, posture, motor skill, proprioception to assist with LE, proximal hip, and core control in self-care, mobility, lifting, ambulation and eccentric single leg control.      NMR and Therapeutic Activities:    [x] (36898 or 26513) Provided verbal/tactile cueing for activities related to improving balance, coordination, kinesthetic sense, posture, motor skill, proprioception and motor activation to allow for proper function of core, proximal hip and LE with self-care and ADLs and functional mobility.   [] (98967) Gait Re-education- Provided training and instruction to the patient for proper LE, core and proximal hip recruitment and positioning and eccentric body weight control with ambulation re-education including up and down stairs     Home Exercise Program:    [x] (41779) Reviewed/Progressed HEP activities related to strengthening, flexibility, endurance, ROM of core, proximal hip and LE for functional self-care, mobility, lifting and ambulation/stair navigation   [] (05903) Reviewed/Progressed HEP activities related to improving balance, coordination, kinesthetic sense, posture, motor skill, proprioception of core, proximal hip and LE for self-care, mobility, lifting, and ambulation/stair navigation      Manual Treatments:  PROM / STM / Oscillations-Mobs:  G-I, II, III, IV (PA's, Inf., Post.)  [x] (65795) Provided manual therapy to mobilize LE, proximal hip and/or LS spine soft tissue/joints for the purpose of modulating pain, promoting relaxation, increasing ROM, reducing/eliminating soft tissue swelling/inflammation/restriction, improving soft tissue extensibility and allowing for proper ROM for normal function with self-care, mobility, lifting and ambulation. Modalities:     [] GAME READY (VASO)- for significant edema, swelling, pain control. Charges:  Timed Code Treatment Minutes: 43'   Total Treatment Minutes: 43'      [] EVAL (LOW) 66457 (typically 20 minutes face-to-face)  [] EVAL (MOD) 70052 (typically 30 minutes face-to-face)  [] EVAL (HIGH) 11031 (typically 45 minutes face-to-face)  [] RE-EVAL     [x] QP(52136)  X 2 [] IONTO   NMR (24365) x     [] VASO  [][x] Manual (77103) x  1 [] Other:  [] TA x      [] Mech Traction (67241)  [] ES(attended) (08349)      [] ES (un) (30580):         ASSESSMENT:  See eval      GOALS:   Patient stated goal:     [x] Progressing: [] Met: [] Not Met: [] Adjusted    Therapist goals for Patient:   Short Term Goals: To be achieved in: 2 weeks  1. Independent in HEP and progression per patient tolerance, in order to prevent re-injury. [x] Progressing: [] Met: [] Not Met: [] Adjusted   2. Patient will have a decrease in pain to facilitate improvement in movement, function, and ADLs as indicated by Functional Deficits. [x] Progressing: [] Met: [] Not Met: [] Adjusted    Long Term Goals: To be achieved in:    12 weeks  - The patient is expected to demonstrate less than 25% impairment, limitation or restriction in:  - carrying, moving and handling objects. - Patient will demonstrate increased passive ROM to a deficiency of only 15% to allow for proper joint functioning as indicated by patients Functional Deficits. [x] Progressing: [] Met: [] Not Met: [] Adjusted  - Patient will demonstrate an increase in Strength to 4-/5 to allow for proper functional mobility as indicated by patients Functional Deficits. [x] Progressing: [] Met: [] Not Met: [] Adjusted  - Patient will return to desired, higher level upper extremity functional activities without increased symptoms or restriction.        [x] Progressing: [] Met: [] Not Met: [] Adjusted              Overall Progression Towards Functional goals/ Treatment Progress Update:  [] Patient is progressing as expected towards functional goals listed. [] Progression is slowed due to complexities/Impairments listed. [] Progression has been slowed due to co-morbidities. [x] Plan just implemented, too soon to assess goals progression <30days   [] Goals require adjustment due to lack of progress  [] Patient is not progressing as expected and requires additional follow up with physician  [] Other    Prognosis for POC: [x] Good [] Fair  [] Poor      Patient requires continued skilled intervention: [x] Yes  [] No    Treatment/Activity Tolerance:  [x] Patient able to complete treatment  [] Patient limited by fatigue  [] Patient limited by pain    [] Patient limited by other medical complications  [] Other: Patient PROM/AAROM ahead of schedule per protocol. Patient is progressing very well at this time. Cueing required for rows to keep UT relaxed. Patient would continue to benefit from continued PT to improve L shld ROM, periscapular and RTC strength, and improve functional ability. Return to Play: (if applicable)   []  Stage 1: Intro to Strength   []  Stage 2: Return to Run and Strength   []  Stage 3: Return to Jump and Strength   []  Stage 4: Dynamic Strength and Agility   []  Stage 5: Sport Specific Training     []  Ready to Return to Play, Meets All Above Stages   []  Not Ready for Return to Sports   Comments:                               PLAN: See eval  [x] Continue per plan of care [] Alter current plan (see comments above)  [] Plan of care initiated [] Hold pending MD visit [] Discharge      Electronically signed by:  Aryan Grullon PT, MPT     Note: If patient does not return for scheduled/ recommended follow up visits, this note will serve as a discharge from care along with most recent update on progress. no

## 2023-02-16 NOTE — ED BEHAVIORAL HEALTH ASSESSMENT NOTE - THOUGHT ASSOCIATIONS
Normal Azithromycin Pregnancy And Lactation Text: This medication is considered safe during pregnancy and is also secreted in breast milk.

## 2023-03-28 NOTE — ED ADULT NURSE NOTE - ILLNESS RECENT EXPOSURE
[facility] Nurse Msg Pool  Hello,     Xolair has been approved effective from 3/24/23 to 9/24/23.     Approval is based on the following:   [employer] review     Dose / frequency: 300mg every 28 days   Number of Visits: 7   Diagnosis: L50.9 (ICD-10-CM) - Hives   Location: Office     Financial Assistance available: yes     Left Message Regarding Copay/Benefits       If you need assistance with additional authorization please reach out to us at P OUTPATIENT INFUSION & CLINIC ADMIN MED [facility] POOL [[id number]]     Marlee Whitt   3/27/23         
LM for patient to return call regarding Xolair approval    Writer would like to confirm  -Copay card/spoke with [employer]  -Epi pens  -Location for injections and if patient would like to do at home after the first 3 injections in the office, per chart notes    
Spoke with patient. She states she will call [employer] back to talk about benefits and get enrolled in the copay card. If she decides to start she would like to go to [place]. Made patient aware of the first 3 injections in the office with a 2 hour wait, carrying epi pens. Patient would like to transition to at home after.     Patient will call back once she talks to [employer] department.   
None known

## 2023-05-03 NOTE — ED BEHAVIORAL HEALTH ASSESSMENT NOTE - NS ED [facility] REVIEW OF ED CHART AVAILABLE INVESTIGATIONS REVIEWED
Colonoscopy Procedure Note      Alexia Morgan  1962  [id number]                Date of Procedure: 05/03/23    Preoperative diagnosis: History of polyps    Postoperative diagnosis: Sigmoid polyp    :  Wilma Ramirez MD    Assistant(s): Circulator: Surya Christensen RN  Endoscopy Technician: Ramón Matias    Sedation: MAC    Complications: None    Implants: None    Procedure Details:  Prior to the procedure, a history and physical were performed. The patients medications, allergies and sensitivities were reviewed and all questions were answered. After informed consent was obtained for the procedure, with all risks and benefits of procedure explained. The patient was taken to the endoscopy suite and placed in the left lateral decubitus position. Patient identification and proposed procedure were verified prior to the procedure by the nurse and I. After sequential anesthesia administered by anesthesiologist, a digital rectal exam was performed and was normal.  The Olympus video colonoscope was introduced through the anus and advanced to cecum, which was identified by the ileocecal valve and appendiceal orifice. The quality of preparation was good. The colonoscope was slowly withdrawn and the mucosa examined for any abnormalities. Cecal withdrawal time was greater than 6 minutes. The patient tolerated the procedure well. There were no complications.       Findings/Interventions:   Polyps - #1, 5 mm in size, located in the sigmoid, removed by cold biopsy and sent for pathology    EBL: none    Recommendations: -Repeat colonoscopy in 10 years   NO aspirin for 5 days     Discharge Disposition:  Maite Lr MD  5/3/2023  10:01 AM
None available

## 2023-08-15 NOTE — ED PROVIDER NOTE - OBJECTIVE STATEMENT
73 y/o  M  hx BPH, Depression, HTN  presents to ER w c/o worsening depressive symptoms , stating " I just cant function anymore". States that he is not taking  any medication and had an ECT treatment in July.  Denies SI/HI/AH/VH.  Denies falling ,punching or kicking any objects. Denies  pain, SOB, fever, chills , chest/abdominal discomfort.   Denies recent use of alcohol or illicit drugs. done

## 2023-08-22 NOTE — ED PROVIDER NOTE - CARE PLAN
Principal Discharge DX:	Anxiety about health Principal Discharge DX:	Anxiety about health  Instructions for follow-up, activity and diet:	1. Take Ativan as prescribed for anxiety  2. Please follow-up with psychiatry within the next 2-3 days, please refer to numbers given to you by psychiatry doctor  3. Return immediately for any worsening or concerning symptoms including any thoughts of wanting to hurt yourself Ketoconazole Counseling:   Patient counseled regarding improving absorption with orange juice.  Adverse effects include but are not limited to breast enlargement, headache, diarrhea, nausea, upset stomach, liver function test abnormalities, taste disturbance, and stomach pain.  There is a rare possibility of liver failure that can occur when taking ketoconazole. The patient understands that monitoring of LFTs may be required, especially at baseline. The patient verbalized understanding of the proper use and possible adverse effects of ketoconazole.  All of the patient's questions and concerns were addressed.

## 2023-11-14 NOTE — ED BEHAVIORAL HEALTH ASSESSMENT NOTE - PRIOR MEDICATION SIDE EFFECTS OR ADVERSE REACTIONS
Yes PAST SURGICAL HISTORY:  History of right salpingo-oophorectomy     History of toe surgery     Rotator Cuff Injury right repair 2006

## 2024-02-06 NOTE — ED BEHAVIORAL HEALTH ASSESSMENT NOTE - AXIS III
[facility] General Surgery Clinic Note    HPI:   Anabel Smith is a 74 yo female with PmHx of HTN, HLD, Afib on Eliquis, and ventral hernia repair X 4 (most recent- robotic repair of multiple defects August 2022 at [facility] w/ [facility]) who presents to clinic today for 6 month follow-up of upper GI reflux symptoms. Patient was first seen in clinic in November of 2022 for cholelithiasis noted on CT scan.     Interval history: Since last visit (7/23), patient reports symptoms of reflux, nausea, and near-constant band-like upper abdominal pain that are worse in the morning and unchanged from last visit. She reports cutting out fatty, greasy foods from her diet, starting Nexium daily in the morning and Pepcid daily at night after last visit,  which she feels has not improved her symptoms. She does not notice a temporal relationship between timing of food intake and pain. Denies vomiting, hematemesis, diarrhea, constipation, dysphagia, fevers/chills.     Patient underwent EGD 1/8/24 for further evaluation of underlying etiology of her symptoms,  which showed mild chronic gastritis without c/f malignancy or infection.     PMH:   Past Medical History:   Diagnosis Date    Arthritis     Atrial fibrillation     BPPV (benign paroxysmal positional vertigo)     Cataract     Diverticulosis     Hernia, ventral     Hyperlipidemia     Hypertension       Meds:   Current Outpatient Medications:     aspirin 81 mg Cap, Take 81 mg by mouth once daily., Disp: , Rfl:     diclofenac sodium (VOLTAREN ARTHRITIS PAIN) 1 % Gel, Apply 2 g topically 2 (two) times daily., Disp: 450 g, Rfl: 1    docusate sodium (COLACE) 100 MG capsule, Take 100 mg by mouth 2 (two) times daily., Disp: , Rfl:     ELIQUIS 5 mg Tab, Take 1 tablet (5 mg total) by mouth 2 (two) times daily., Disp: 90 tablet, Rfl: 4    ergocalciferol (ERGOCALCIFEROL) 50,000 unit Cap, Take 1 capsule (50,000 Units total) by mouth every 7 days., Disp: 8 capsule, Rfl: 0    esomeprazole  (NEXIUM) 40 MG capsule, TAKE 1 CAPSULE BY MOUTH DAILY ON AN EMPTY STOMACH, Disp: 90 capsule, Rfl: 3    famotidine (PEPCID) 20 MG tablet, Take 1 tablet (20 mg total) by mouth nightly as needed for Heartburn., Disp: 30 tablet, Rfl: 11    furosemide (LASIX) 40 MG tablet, Take 1 tablet (40 mg total) by mouth once daily., Disp: 90 tablet, Rfl: 2    gabapentin (NEURONTIN) 300 MG capsule, Take 1 capsule (300 mg total) by mouth 3 (three) times daily., Disp: 90 capsule, Rfl: 3    meloxicam (MOBIC) 7.5 MG tablet, Take 1 tablet (7.5 mg total) by mouth once daily., Disp: 30 tablet, Rfl: 2    metFORMIN (GLUCOPHAGE) 500 MG tablet, Take 1 tablet (500 mg total) by mouth 2 (two) times daily., Disp: 90 tablet, Rfl: 3    metoprolol succinate (TOPROL-XL) 25 MG 24 hr tablet, Take 1 tablet (25 mg total) by mouth 2 (two) times daily., Disp: 180 tablet, Rfl: 3    ondansetron (ZOFRAN-ODT) 4 MG TbDL, Take 1 tablet (4 mg total) by mouth every 8 (eight) hours as needed (nausea)., Disp: 10 tablet, Rfl: 0    oxybutynin (DITROPAN-XL) 10 MG 24 hr tablet, Take 1 tablet (10 mg total) by mouth once daily., Disp: 90 tablet, Rfl: 3    rosuvastatin (CRESTOR) 40 MG Tab, Take 1 tablet (40 mg total) by mouth once daily., Disp: 90 tablet, Rfl: 3    Current Facility-Administered Medications:     prednisoLONE acetate 1 % ophthalmic suspension 1 drop, 1 drop, Left Eye, On Call Procedure, Gerald Villa MD    Facility-Administered Medications Ordered in Other Visits:     0.9%  NaCl infusion, , Intravenous, Continuous, Stacie Julien MD, Last Rate: 10 mL/hr at 05/18/23 0608, New Bag at 06/20/23 0939    LIDOcaine (PF) 10 mg/ml (1%) injection 10 mg, 1 mL, Intradermal, Once, Stacie Julien MD  Allergies:   Review of patient's allergies indicates:   Allergen Reactions    Amlodipine Swelling     Also AMS     Social History:   Social History     Tobacco Use    Smoking status: Never     Passive exposure: Never    Smokeless tobacco: Never    Substance Use Topics    Alcohol use: Never    Drug use: Never     Family History:   Family History   Problem Relation Age of Onset    Heart disease Mother     Heart attack Mother     Hyperlipidemia Mother     Hypertension Mother     Stroke Mother     Arthritis Mother     Cancer Mother     Cancer Father      Surgical History:   Past Surgical History:   Procedure Laterality Date    EGD, WITH CLOSED BIOPSY N/A 1/8/2024    Procedure: EGD, WITH CLOSED BIOPSY;  Surgeon: Roberta Shepard MD;  Location: [facility] ENDOSCOPY;  Service: Gastroenterology;  Laterality: N/A;  pt taking Eliquis last prescribed by Dr. Ojeda, does see Cardiology at [facility]    HERNIA REPAIR      HYSTERECTOMY  1981    PHACOEMULSIFICATION, CATARACT, WITH IOL INSERTION Left 5/18/2023    Procedure: PHACOEMULSIFICATION, CATARACT, WITH IOL INSERTION;  Surgeon: Dallas Cancino MD;  Location: [facility] OR;  Service: Ophthalmology;  Laterality: Left;    PHACOEMULSIFICATION, CATARACT, WITH IOL INSERTION Right 6/20/2023    Procedure: PHACOEMULSIFICATION, CATARACT, WITH IOL INSERTION;  Surgeon: Ren Solomon MD;  Location: [facility] OR;  Service: Ophthalmology;  Laterality: Right;    SURGICAL REMOVAL OF SEROMA OF FEMORAL REGION       Review of Systems:  Negative except for as stated in HPI    Objective:    Vitals:  Vitals:    02/06/24 0946   BP: 116/77   Pulse: 71   Resp: 18   Temp: 98.1 °F (36.7 °C)        Physical Exam:  Gen: NAD  Neuro: awake, alert, answering questions appropriately  CV: RRR  Resp: non-labored breathing, MONICA  Abd: soft, ND, NT. Surgical scar from midline incision well healed  Ext: moves all 4 spontaneously and purposefully  Skin: warm, well perfused        Imaging:  EGD 1/8/24  Findings:       -The esophagus was normal.       - Patchy mildly erythematous mucosa was found in the gastric antrum.        -Biopsies were taken with a cold forceps for Helicobacter pylori        testing.        -The examined duodenum was normal.   Impression:     - Normal esophagus.                          - Erythematous mucosa in the antrum. Biopsied.                          - Normal examined duodenum.     Micro/Path/Other:     Stomach biopsy rule out H.pylori (1/8/24)  - Mild chronic gastritis.  - Diff Quik stain is negative for H.pylori organisms.  - Negative for dysplasia.     Assessment/Plan:  Anabel Smith is a 74 yo female with PmHx as described in HPI  who presents to clinic today for 6 month follow-up of upper GI reflux symptoms. Patient without symptoms from previously noted cholelithiasis on CT in 2022.  Patient with stable symptoms of reflux that are not preventing her from performing her usual activities. Given patient's comorbid conditions (AF on Eliquis, multiple previous abdominal surgeries) making her a high risk surgical candidate, recommend against pursuing surgical management at this time. Recommended patient follow-up with [facility] if considering surgery, as surgical management of reflux is not performed at [facility].     - ***    Kiah Yeager, MS4  02/06/2024 10:35 AM      Prostate CA, melanoma

## 2024-03-13 NOTE — ED BEHAVIORAL HEALTH ASSESSMENT NOTE - SUMMARY
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     Patient presents very anxious, unkempt, and reports not caring for himself. Pt amenable to voluntary psychiatric admission for stabilization.

## 2024-03-13 NOTE — ED BEHAVIORAL HEALTH ASSESSMENT NOTE - FAMILY HISTORY OF SUBSTANCE ABUSE
Objective:   /82   Pulse 76   Temp 97.9 °F (36.6 °C)   Ht 1.84 m (6' 0.44\")   Wt 89.8 kg (198 lb)   SpO2 97%   BMI 26.53 kg/m²     Physical Exam  Vitals reviewed.   Constitutional:       General: He is not in acute distress.     Appearance: He is well-developed.   HENT:      Head: Normocephalic and atraumatic.      Right Ear: External ear normal.      Left Ear: External ear normal.      Nose: Nose normal.   Eyes:      General:         Right eye: No discharge.         Left eye: No discharge.      Conjunctiva/sclera: Conjunctivae normal.      Pupils: Pupils are equal, round, and reactive to light.   Neck:      Thyroid: No thyromegaly.   Cardiovascular:      Rate and Rhythm: Normal rate and regular rhythm.      Heart sounds: Normal heart sounds.   Pulmonary:      Effort: Pulmonary effort is normal. No respiratory distress.      Breath sounds: Normal breath sounds.   Abdominal:      General: There is no distension.   Musculoskeletal:      Cervical back: Neck supple.   Skin:     General: Skin is warm and dry.   Neurological:      Mental Status: He is alert and oriented to person, place, and time.      Coordination: Coordination normal.   Psychiatric:         Thought Content: Thought content normal.         Judgment: Judgment normal.         No results found for this visit on 03/13/24.    Recent Results (from the past 2016 hour(s))   Pancreatic Elastase, Fecal    Collection Time: 01/04/24  3:28 PM   Result Value Ref Range    Pancreatic Elastase, Fecal >800 >=100 ug/g   Calprotectin Stool    Collection Time: 01/04/24  3:28 PM   Result Value Ref Range    Calprotectin, Fecal 184 (H) <=49 ug/g   Gamma GT    Collection Time: 01/17/24 10:32 AM   Result Value Ref Range     (H) 8 - 61 U/L   ALT    Collection Time: 01/17/24 10:32 AM   Result Value Ref Range    ALT 25 0 - 41 U/L   AST    Collection Time: 01/17/24 10:32 AM   Result Value Ref Range    AST 44 (H) 0 - 40 U/L   Sedimentation Rate    Collection  None known

## 2024-04-25 ENCOUNTER — INPATIENT (INPATIENT)
Facility: HOSPITAL | Age: 79
LOS: 12 days | Discharge: NURSING, REHAB OR LONG-TERM CARE | DRG: 558 | End: 2024-05-08
Attending: HOSPITALIST | Admitting: STUDENT IN AN ORGANIZED HEALTH CARE EDUCATION/TRAINING PROGRAM
Payer: MEDICARE

## 2024-04-25 VITALS
OXYGEN SATURATION: 80 % | HEART RATE: 67 BPM | DIASTOLIC BLOOD PRESSURE: 83 MMHG | TEMPERATURE: 97 F | RESPIRATION RATE: 20 BRPM | WEIGHT: 149.91 LBS | HEIGHT: 71 IN | SYSTOLIC BLOOD PRESSURE: 128 MMHG

## 2024-04-25 DIAGNOSIS — M62.82 RHABDOMYOLYSIS: ICD-10-CM

## 2024-04-25 DIAGNOSIS — A41.9 SEPSIS, UNSPECIFIED ORGANISM: ICD-10-CM

## 2024-04-25 LAB
ALBUMIN SERPL ELPH-MCNC: 4.6 G/DL — SIGNIFICANT CHANGE UP (ref 3.3–5)
ALP SERPL-CCNC: 110 U/L — SIGNIFICANT CHANGE UP (ref 40–120)
ALT FLD-CCNC: 108 U/L — HIGH (ref 10–45)
ANION GAP SERPL CALC-SCNC: 22 MMOL/L — HIGH (ref 5–17)
APPEARANCE UR: CLEAR — SIGNIFICANT CHANGE UP
APTT BLD: 32.8 SEC — SIGNIFICANT CHANGE UP (ref 24.5–35.6)
AST SERPL-CCNC: 174 U/L — HIGH (ref 10–40)
BACTERIA # UR AUTO: NEGATIVE /HPF — SIGNIFICANT CHANGE UP
BASE EXCESS BLDV CALC-SCNC: -1.8 MMOL/L — SIGNIFICANT CHANGE UP (ref -2–3)
BASOPHILS # BLD AUTO: 0 K/UL — SIGNIFICANT CHANGE UP (ref 0–0.2)
BASOPHILS NFR BLD AUTO: 0 % — SIGNIFICANT CHANGE UP (ref 0–2)
BILIRUB SERPL-MCNC: 1 MG/DL — SIGNIFICANT CHANGE UP (ref 0.2–1.2)
BILIRUB UR-MCNC: NEGATIVE — SIGNIFICANT CHANGE UP
BUN SERPL-MCNC: 73 MG/DL — HIGH (ref 7–23)
CA-I SERPL-SCNC: 1.28 MMOL/L — SIGNIFICANT CHANGE UP (ref 1.15–1.33)
CALCIUM SERPL-MCNC: 10.8 MG/DL — HIGH (ref 8.4–10.5)
CAST: 1 /LPF — SIGNIFICANT CHANGE UP (ref 0–4)
CHLORIDE BLDV-SCNC: 106 MMOL/L — SIGNIFICANT CHANGE UP (ref 96–108)
CHLORIDE SERPL-SCNC: 105 MMOL/L — SIGNIFICANT CHANGE UP (ref 96–108)
CK SERPL-CCNC: 3521 U/L — HIGH (ref 30–200)
CO2 BLDV-SCNC: 26 MMOL/L — SIGNIFICANT CHANGE UP (ref 22–26)
CO2 SERPL-SCNC: 19 MMOL/L — LOW (ref 22–31)
COLOR SPEC: SIGNIFICANT CHANGE UP
CREAT SERPL-MCNC: 1.52 MG/DL — HIGH (ref 0.5–1.3)
DIFF PNL FLD: ABNORMAL
EGFR: 47 ML/MIN/1.73M2 — LOW
EOSINOPHIL # BLD AUTO: 0 K/UL — SIGNIFICANT CHANGE UP (ref 0–0.5)
EOSINOPHIL NFR BLD AUTO: 0 % — SIGNIFICANT CHANGE UP (ref 0–6)
ETHANOL SERPL-MCNC: <10 MG/DL — SIGNIFICANT CHANGE UP (ref 0–10)
GAS PNL BLDV: 141 MMOL/L — SIGNIFICANT CHANGE UP (ref 136–145)
GAS PNL BLDV: SIGNIFICANT CHANGE UP
GAS PNL BLDV: SIGNIFICANT CHANGE UP
GLUCOSE BLDV-MCNC: 144 MG/DL — HIGH (ref 70–99)
GLUCOSE SERPL-MCNC: 141 MG/DL — HIGH (ref 70–99)
GLUCOSE UR QL: NEGATIVE MG/DL — SIGNIFICANT CHANGE UP
HCO3 BLDV-SCNC: 25 MMOL/L — SIGNIFICANT CHANGE UP (ref 22–29)
HCT VFR BLD CALC: 52.3 % — HIGH (ref 39–50)
HCT VFR BLDA CALC: 55 % — HIGH (ref 39–51)
HGB BLD CALC-MCNC: 18.2 G/DL — HIGH (ref 12.6–17.4)
HGB BLD-MCNC: 17.8 G/DL — HIGH (ref 13–17)
INR BLD: 1.19 RATIO — HIGH (ref 0.85–1.18)
KETONES UR-MCNC: 15 MG/DL
LACTATE BLDV-MCNC: 3.4 MMOL/L — HIGH (ref 0.5–2)
LEUKOCYTE ESTERASE UR-ACNC: NEGATIVE — SIGNIFICANT CHANGE UP
LIDOCAIN IGE QN: 44 U/L — SIGNIFICANT CHANGE UP (ref 7–60)
LYMPHOCYTES # BLD AUTO: 10.4 % — LOW (ref 13–44)
LYMPHOCYTES # BLD AUTO: 2.38 K/UL — SIGNIFICANT CHANGE UP (ref 1–3.3)
MANUAL SMEAR VERIFICATION: SIGNIFICANT CHANGE UP
MCHC RBC-ENTMCNC: 34 GM/DL — SIGNIFICANT CHANGE UP (ref 32–36)
MCHC RBC-ENTMCNC: 34 PG — SIGNIFICANT CHANGE UP (ref 27–34)
MCV RBC AUTO: 100 FL — SIGNIFICANT CHANGE UP (ref 80–100)
MONOCYTES # BLD AUTO: 1.79 K/UL — HIGH (ref 0–0.9)
MONOCYTES NFR BLD AUTO: 7.8 % — SIGNIFICANT CHANGE UP (ref 2–14)
NEUTROPHILS # BLD AUTO: 18.74 K/UL — HIGH (ref 1.8–7.4)
NEUTROPHILS NFR BLD AUTO: 81.8 % — HIGH (ref 43–77)
NITRITE UR-MCNC: NEGATIVE — SIGNIFICANT CHANGE UP
PCO2 BLDV: 47 MMHG — SIGNIFICANT CHANGE UP (ref 42–55)
PH BLDV: 7.33 — SIGNIFICANT CHANGE UP (ref 7.32–7.43)
PH UR: 5.5 — SIGNIFICANT CHANGE UP (ref 5–8)
PLAT MORPH BLD: NORMAL — SIGNIFICANT CHANGE UP
PLATELET # BLD AUTO: 344 K/UL — SIGNIFICANT CHANGE UP (ref 150–400)
PO2 BLDV: 20 MMHG — LOW (ref 25–45)
POTASSIUM BLDV-SCNC: 4.8 MMOL/L — SIGNIFICANT CHANGE UP (ref 3.5–5.1)
POTASSIUM SERPL-MCNC: 4.8 MMOL/L — SIGNIFICANT CHANGE UP (ref 3.5–5.3)
POTASSIUM SERPL-SCNC: 4.8 MMOL/L — SIGNIFICANT CHANGE UP (ref 3.5–5.3)
PROT SERPL-MCNC: 8.3 G/DL — SIGNIFICANT CHANGE UP (ref 6–8.3)
PROT UR-MCNC: 100 MG/DL
PROTHROM AB SERPL-ACNC: 13 SEC — SIGNIFICANT CHANGE UP (ref 9.5–13)
RBC # BLD: 5.23 M/UL — SIGNIFICANT CHANGE UP (ref 4.2–5.8)
RBC # FLD: 13.3 % — SIGNIFICANT CHANGE UP (ref 10.3–14.5)
RBC BLD AUTO: SIGNIFICANT CHANGE UP
RBC CASTS # UR COMP ASSIST: 2 /HPF — SIGNIFICANT CHANGE UP (ref 0–4)
REVIEW: SIGNIFICANT CHANGE UP
SAO2 % BLDV: 25.4 % — LOW (ref 67–88)
SODIUM SERPL-SCNC: 146 MMOL/L — HIGH (ref 135–145)
SP GR SPEC: 1.03 — SIGNIFICANT CHANGE UP (ref 1–1.03)
SQUAMOUS # UR AUTO: 0 /HPF — SIGNIFICANT CHANGE UP (ref 0–5)
TROPONIN T, HIGH SENSITIVITY RESULT: 35 NG/L — SIGNIFICANT CHANGE UP (ref 0–51)
UROBILINOGEN FLD QL: 0.2 MG/DL — SIGNIFICANT CHANGE UP (ref 0.2–1)
WBC # BLD: 22.91 K/UL — HIGH (ref 3.8–10.5)
WBC # FLD AUTO: 22.91 K/UL — HIGH (ref 3.8–10.5)
WBC UR QL: 0 /HPF — SIGNIFICANT CHANGE UP (ref 0–5)

## 2024-04-25 PROCEDURE — 72125 CT NECK SPINE W/O DYE: CPT | Mod: 26,MC

## 2024-04-25 PROCEDURE — 99285 EMERGENCY DEPT VISIT HI MDM: CPT

## 2024-04-25 PROCEDURE — 71045 X-RAY EXAM CHEST 1 VIEW: CPT | Mod: 26

## 2024-04-25 PROCEDURE — 72132 CT LUMBAR SPINE W/DYE: CPT | Mod: 26,MC

## 2024-04-25 PROCEDURE — 72170 X-RAY EXAM OF PELVIS: CPT | Mod: 26

## 2024-04-25 PROCEDURE — 72129 CT CHEST SPINE W/DYE: CPT | Mod: 26,MC

## 2024-04-25 PROCEDURE — 99223 1ST HOSP IP/OBS HIGH 75: CPT | Mod: GC

## 2024-04-25 PROCEDURE — 71275 CT ANGIOGRAPHY CHEST: CPT | Mod: 26,MC

## 2024-04-25 PROCEDURE — 70450 CT HEAD/BRAIN W/O DYE: CPT | Mod: 26,MC

## 2024-04-25 PROCEDURE — 74177 CT ABD & PELVIS W/CONTRAST: CPT | Mod: 26,MC

## 2024-04-25 RX ORDER — MORPHINE SULFATE 50 MG/1
4 CAPSULE, EXTENDED RELEASE ORAL ONCE
Refills: 0 | Status: DISCONTINUED | OUTPATIENT
Start: 2024-04-25 | End: 2024-04-25

## 2024-04-25 RX ORDER — SODIUM CHLORIDE 9 MG/ML
1000 INJECTION, SOLUTION INTRAVENOUS ONCE
Refills: 0 | Status: COMPLETED | OUTPATIENT
Start: 2024-04-25 | End: 2024-04-25

## 2024-04-25 RX ORDER — CEFTRIAXONE 500 MG/1
1000 INJECTION, POWDER, FOR SOLUTION INTRAMUSCULAR; INTRAVENOUS ONCE
Refills: 0 | Status: COMPLETED | OUTPATIENT
Start: 2024-04-25 | End: 2024-04-25

## 2024-04-25 RX ADMIN — SODIUM CHLORIDE 1000 MILLILITER(S): 9 INJECTION, SOLUTION INTRAVENOUS at 18:17

## 2024-04-25 RX ADMIN — CEFTRIAXONE 100 MILLIGRAM(S): 500 INJECTION, POWDER, FOR SOLUTION INTRAMUSCULAR; INTRAVENOUS at 19:28

## 2024-04-25 RX ADMIN — MORPHINE SULFATE 4 MILLIGRAM(S): 50 CAPSULE, EXTENDED RELEASE ORAL at 20:56

## 2024-04-25 RX ADMIN — MORPHINE SULFATE 4 MILLIGRAM(S): 50 CAPSULE, EXTENDED RELEASE ORAL at 21:29

## 2024-04-25 RX ADMIN — SODIUM CHLORIDE 1000 MILLILITER(S): 9 INJECTION, SOLUTION INTRAVENOUS at 23:04

## 2024-04-25 NOTE — ED ADULT NURSE REASSESSMENT NOTE - NS ED NURSE REASSESS COMMENT FT1
Tello catheter placed for urinary retention with second RN at bedside ensuring sterile technique. Pt voided 300 cc's of maurice urine with sediment tolerating procedure well. Urine sample sent to lab.

## 2024-04-25 NOTE — H&P ADULT - PROBLEM SELECTOR PLAN 5
CTA Chest : Mildly displaced right eighth anterolateral right rib fracture, with limited pain    Plan:  -pain regimen as above  -continue to monitor

## 2024-04-25 NOTE — H&P ADULT - NSHPREVIEWOFSYSTEMS_GEN_ALL_CORE
REVIEW OF SYSTEMS:    CONSTITUTIONAL: +weakness, fevers or chills  EYES/ENT: No visual changes;  No vertigo or throat pain   NECK: No pain or stiffness  RESPIRATORY: No cough, wheezing, hemoptysis; +pleuritic chest pain  CARDIOVASCULAR: No chest pain or palpitations  GASTROINTESTINAL: No abdominal or epigastric pain. No nausea, vomiting, or hematemesis; No diarrhea or constipation. No melena or hematochezia.  GENITOURINARY: No dysuria, frequency or hematuria  NEUROLOGICAL: No numbness or weakness  SKIN: No itching, rashes

## 2024-04-25 NOTE — H&P ADULT - PROBLEM SELECTOR PLAN 6
CK 3521 iso being down for 2 days at home and inability to get up. UA with moderate blood, 2 RBCs c/w rhabdo. Pt received 2 L IV fluid bolus in ED    Plan:  -f/u repeat CK in AM to monitor for downtrend  -Monitor renal function on BMP CT noting L1 vertebra loss of height, fracture of unclear chronicity. Neurosurg evaluated noting L1 schmorl’s node w/ very-very mild A3 burst fx minimally displaced, TLICS 2. CT C spine with sclerotic lesion to C3 pedicle SINS 6.    Plan:  -neurosurgery consulted, recs appreciated  -pain management w tylenol q6 prn for mild, oxy 2.5 mg q6 prn for moderate pain  -no acute neurosurgery intervention  -TLSO brace when OOB  -AP/Lat/Flex/Ex lumbar X-rays  -output f/u w/Dr. Rodrigues in 4-6W   -defer to heme/onc regarding possible w/u of C3 lesion given low SINS and lack of symptoms

## 2024-04-25 NOTE — H&P ADULT - ATTENDING COMMENTS
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, prostate CA pw fall w/ inability to get up for 2 days found to have rhabdo c/b MEREDITH, rib fx c/f AHRF, and vertebral fx    # Rhabdo c/b MEREDITH  # Elevated liver enzymes  # Leukocytosis   - Unclear cause of fall  - CK 3521, UA mod blood 2 RBC   - Suspect reactive leukocytosis   - s/p 2L IVF, c/w maintenance IVF   - Monitor CK    # Rib Fx  - Wean off O2   - Incentive Spirometry     # Lumbar vertebral fx   - No surgical intervention  - TLSO brace in AM  - Pain control     # Retention   - s/p Leger  - Monitor I/Os for now w/ leger  - TOV    # C3 lesion  - Consider inpt heme/onc eval vs outpt f/u for further w/u     # Rest of plan as above    I have personally spent 75 minutes reviewing tests and imaging, independently obtaining a history, performing a physical examination, discussing the plan with the patient, documenting clinical information, and coordinating care.

## 2024-04-25 NOTE — ED ADULT NURSE REASSESSMENT NOTE - NS ED NURSE REASSESS COMMENT FT1
Pt bladder scanned displaying residual volume of >975cc's. Dr. Kent notified, Tello indwelling catheter to be ordered.

## 2024-04-25 NOTE — ED ADULT NURSE NOTE - NSFALLRISKINTERV_ED_ALL_ED

## 2024-04-25 NOTE — ED PROVIDER NOTE - PHYSICAL EXAMINATION
General: Appears well and nontoxic  Mentation: AAO x 3  psych: mood appropriate  HEENT: airway patent, conjunctivae clear bilaterally  Resp: symmetric chest rise, no resp distress, breath sounds CTA bilaterally  Cardio: RRR, no m/r/g  GI: soft/nondistended, RLQ tenderness  Neuro: sensation and motor function grossly intact  Skin: no cyanosis, no jaundice  MSK: bilateral chest wall tenderness  Lymph/Vasc: no LE edema

## 2024-04-25 NOTE — H&P ADULT - PROBLEM SELECTOR PLAN 3
Patient presented with AHRF, saturation 80%, improved s/p 6 L O2, now on 2L O2. CXR negative for acute pathology. CTA negative for PE    Plan:  -monitor SpO2  -Wean O2 as tolerated Pt afebrile on admission, SIRS criteria met with tachycardia, tachypnea. Had AHRF to SpO2 80%, placed on 6L NC. CXR neg for PNA, UA collected after CTX 1 g in ED. CTA Chest neg for PE. CT A/P without focus of infection, possible urinary source. No prior hx of MDR organisms    Plan:  -c/w empiric CTX (4/25-   -f/u blood cultures and urine culture  -f/u RVP  -Monitor wbc and fever curve Pt afebrile on admission, SIRS criteria met with tachycardia, tachypnea. Had AHRF to SpO2 80%, placed on 6L NC. CXR neg for PNA, UA collected after CTX 1 g in ED. CTA Chest neg for PE. CT without focus of infection, possible urinary source. No prior hx of MDR organisms    Plan:  -low suspicion for infection but c/w empiric CTX for short course(4/25-   -f/u blood cultures and urine culture  -f/u RVP  -Monitor wbc and fever curve

## 2024-04-25 NOTE — H&P ADULT - NSHPLABSRESULTS_GEN_ALL_CORE
LABS:  04-25 @ 17:24 Creatine 3521 U/L<H> [30 - 200]                          17.8   22.91 )-----------( 344      ( 25 Apr 2024 17:24 )             52.3     04-25    146<H>  |  105  |  73<H>  ----------------------------<  141<H>  4.8   |  19<L>  |  1.52<H>    Ca    10.8<H>      25 Apr 2024 17:24    TPro  8.3  /  Alb  4.6  /  TBili  1.0  /  DBili  x   /  AST  174<H>  /  ALT  108<H>  /  AlkPhos  110  04-25    PT/INR - ( 25 Apr 2024 17:24 )   PT: 13.0 sec;   INR: 1.19 ratio         PTT - ( 25 Apr 2024 17:24 )  PTT:32.8 sec

## 2024-04-25 NOTE — ED ADULT NURSE REASSESSMENT NOTE - NS ED NURSE REASSESS COMMENT FT1
Patient currently resting comfortably w/ no complaints or requests at this time. Plan of care explained. Stretcher in lowest position with call bell within reach and side rails up. Comfort and safety provided.

## 2024-04-25 NOTE — ED PROVIDER NOTE - ATTENDING CONTRIBUTION TO CARE
I, Dusty Delarosa MD, Emergency Medicine Attending Physician, personally saw and examined the patient and I personally made/approve the management plan and take responsibility for the patient management.    MDM: 78-year-old male with history of bipolar disorder, personality disorder with past ECT treatment, psychiatric admissions, depression, hypertension, BPH, who was brought in by EMS for fall and inability to get up.  Patient was cleaning and had a fall on Monday night and has been on the floor since.  Neighbors called EMS when they heard shouting in the house.  Patient reports bilateral rib pain left greater than right with breathing.  EMS notes patient was hypoxic to 80% on room air and started on supplemental oxygen.  Patient denies any history of alcohol use, alcohol withdrawal.    Vitals: Elevated blood pressure, tachypneic, tachycardic, afebrile rectally, hypoxic.  Primary Survey: airway intact, breathing with symmetrical bilateral lung sounds, circulation with pulses in all 4 extremities.  Secondary Survey: Uncomfortable appearing, NCAT, awake and alert, PERRL, EOMI without diplopia or discomfort, trachea midline, no stridor, (+) dry mucosal membranes.  CTAB, NRRR.  (+) Bilateral anterior chest wall tenderness, but no deformity or crepitus.  (+) RLQ abdominal tenderness, but no rigidity or guarding.  No CVA tenderness.  No signs of external trauma to chest and abdomen, no ecchymosis.  No tenderness or deformity to head, maxillo-facial, or midline of cervical/thoracic/lumbar vertebrae.  No tenderness, deformity or reduced ROM to all joints of all four extremities.  Examination of pelvis and bilateral hip shows stable pelvis, no shortening or abnormal rotation, normal range of motion of hip, negative logroll, FADIR, and scour test.  Neurovascularly intact in all 4 extremities with 5/5 strength, normal sensation, equal pulses and brisk capillary refill, soft compartments.      Will obtain CT Head, C-spine, Chest, and Abd/Pelvis due to areas of tenderness and mechanism of injury to rule out acute traumatic injury.  Will obtain labs to evaluate for hematologic disorder, metabolic derangements, hepatic and renal function, and screen for infection, and rhabdomyolysis.  Unknown mechanism of fall, evaluate for syncope.  Will keep patient on cardiac monitor, obtain EKG and troponin to evaluate for possible cardiac abnormality including ACS and arrhythmia.  Patient with history of SI and inpatient psychiatric admission, but denies any active SI or plan, denies HI or hallucinations/delusions.    My independent interpretation of the EKG shows:  Sinus tachycardia with rate of 106 bpm, leftward axis, no ST elevations or depressions.  Poor baseline.    Labs show significant leukocytosis 22.9, creatinine elevated to 1.52, up trended from prior.  Anion gap acidosis noted.  LFT mildly elevated.  CK 3500.  Lactate 3.4.    My independent interpretation of the chest x-ray images shows no focal opacity. X-ray of pelvis with no acute fracture or dislocation.    More details to be seen in the official radiologist read.     Since patient is meeting SIRS criteria, will start empiric antibiotics. I, Dusty Delarosa MD, Emergency Medicine Attending Physician, personally saw and examined the patient and I personally made/approve the management plan and take responsibility for the patient management.    MDM: 78-year-old male with history of bipolar disorder, personality disorder with past ECT treatment, psychiatric admissions, depression, hypertension, BPH, who was brought in by EMS for fall and inability to get up.  Patient was cleaning and had a fall on Monday night and has been on the floor since.  Neighbors called EMS when they heard shouting in the house.  Patient reports bilateral rib pain left greater than right with breathing.  EMS notes patient was hypoxic to 80% on room air and started on supplemental oxygen.  Patient denies any history of alcohol use, alcohol withdrawal.    Vitals: Elevated blood pressure, tachypneic, tachycardic, afebrile rectally, hypoxic.  Primary Survey: airway intact, breathing with symmetrical bilateral lung sounds, circulation with pulses in all 4 extremities.  Secondary Survey: Uncomfortable appearing, NCAT, awake and alert, PERRL, EOMI without diplopia or discomfort, trachea midline, no stridor, (+) dry mucosal membranes.  CTAB, NRRR.  (+) Bilateral anterior chest wall tenderness, but no deformity or crepitus.  (+) RLQ abdominal tenderness, but no rigidity or guarding.  No CVA tenderness.  No signs of external trauma to chest and abdomen, no ecchymosis.  No tenderness or deformity to head, maxillo-facial, or midline of cervical/thoracic/lumbar vertebrae.  No tenderness, deformity or reduced ROM to all joints of all four extremities.  Examination of pelvis and bilateral hip shows stable pelvis, no shortening or abnormal rotation, normal range of motion of hip, negative logroll, FADIR, and scour test.  Neurovascularly intact in all 4 extremities with 5/5 strength, normal sensation, equal pulses and brisk capillary refill, soft compartments.      Will obtain CT Head, C-spine, Chest, and Abd/Pelvis due to areas of tenderness and mechanism of injury to rule out acute traumatic injury.  Will obtain labs to evaluate for hematologic disorder, metabolic derangements, hepatic and renal function, and screen for infection, and rhabdomyolysis.  Unknown mechanism of fall, evaluate for syncope.  Will keep patient on cardiac monitor, obtain EKG and troponin to evaluate for possible cardiac abnormality including ACS and arrhythmia.  Patient with history of SI and inpatient psychiatric admission, but denies any active SI or plan, denies HI or hallucinations/delusions.    My independent interpretation of the EKG shows:  Sinus tachycardia with rate of 106 bpm, leftward axis, no ST elevations or depressions.  Poor baseline.    Labs show significant leukocytosis 22.9, creatinine elevated to 1.52, up trended from prior.  Anion gap acidosis noted.  LFT mildly elevated.  CK 3500.  Lactate 3.4.    My independent interpretation of the chest x-ray images shows no focal opacity. X-ray of pelvis with no acute fracture or dislocation.    More details to be seen in the official radiologist read.     Since patient is meeting SIRS criteria, will start empiric antibiotics.    Urinalysis with moderate blood but only 2 RBC.  CT imaging showed no acute ICH, no acute C-spine fracture, mildly displaced right eighth anterior lateral right rib fracture (tender overlying, but no splinting or hypoxia).  Marked height loss at L1 vertebral body, uncertain chronicity, but patient without any pain at the site and no tenderness overlying.  No pulm embolism.    The patient will need to be admitted to the hospital for continued evaluation and management.  Discussed with the accepting physician regarding the initial presentation, diagnostic studies, treatments given in the ED, and current plan of care.  I, Dr. Dusty Delarosa, spoke directly to the admitting attending physician, Dr. Juan Jose Lanza, who accepted the patient to their service.  The patient was accepted by and endorsed to the medicine team.

## 2024-04-25 NOTE — H&P ADULT - NSHPPHYSICALEXAM_GEN_ALL_CORE
T(C): 36.2 (04-25-24 @ 17:36), Max: 36.2 (04-25-24 @ 17:36)  HR: 106 (04-25-24 @ 21:01) (67 - 117)  BP: 111/78 (04-25-24 @ 21:01) (111/78 - 173/121)  RR: 20 (04-25-24 @ 21:01) (20 - 24)  SpO2: 98% (04-25-24 @ 21:01) (80% - 100%)    CONSTITUTIONAL: Elderly male, appears in mild discomfort  EYES: PERRLA and symmetric, EOMI, No conjunctival or scleral injection, non-icteric, no periorbital ecchymoses  ENMT: Oral mucosa with dry mucous membranes. Normal dentition; no pharyngeal injection or exudates             NECK: Supple, symmetric and without tracheal deviation   RESP: No respiratory distress, no use of accessory muscles; CTA b/l, no WRR  CV: RRR, +S1S2, no MRG; no JVD; no peripheral edema, +B/l anterior chest wall ttp, no ecchymoses  GI: Soft, NT, ND, no rebound, no guarding; no palpable masses; no hepatosplenomegaly; no hernia palpated  LYMPH: No cervical LAD or tenderness; no axillary LAD or tenderness; no inguinal LAD or tenderness  MSK: Normal gait; No digital clubbing or cyanosis; examination of the (head/neck/spine/ribs/pelvis, RUE, LUE, RLE, LLE) without misalignment,            Normal ROM without pain, no spinal tenderness, normal muscle strength/tone  SKIN: No rashes or ulcers noted; no subcutaneous nodules or induration palpable  NEURO: CN II-XII intact; normal reflexes in upper and lower extremities, sensation intact in upper and lower extremities b/l to light touch   BACK: No spinal tenderness to palpation, no CVA tenderness   PSYCH: Appropriate insight/judgment; A+O x 3, mood and affect appropriate, recent/remote memory intact T(C): 36.2 (04-25-24 @ 17:36), Max: 36.2 (04-25-24 @ 17:36)  HR: 106 (04-25-24 @ 21:01) (67 - 117)  BP: 111/78 (04-25-24 @ 21:01) (111/78 - 173/121)  RR: 20 (04-25-24 @ 21:01) (20 - 24)  SpO2: 98% (04-25-24 @ 21:01) (80% - 100%)    CONSTITUTIONAL: Elderly male, appears in mild discomfort  EYES: PERRLA and symmetric, EOMI, No conjunctival or scleral injection, non-icteric, no periorbital ecchymoses  ENMT: Oral mucosa with dry mucous membranes. Normal dentition; no pharyngeal injection or exudates             NECK: Supple, symmetric and without tracheal deviation   RESP: No respiratory distress, no use of accessory muscles; CTA b/l, no WRR  CV: RRR, +S1S2, no MRG; no JVD; no peripheral edema, +B/l anterior chest wall ttp, mild R anterior chest wall ecchymoses  GI: Soft, NT, ND, no rebound, no guarding; no palpable masses; no hepatosplenomegaly; no hernia palpated  LYMPH: No cervical LAD or tenderness; no axillary LAD or tenderness; no inguinal LAD or tenderness  MSK: No digital clubbing or cyanosis; examination of the (head/neck/spine/ribs/pelvis, RUE, LUE, RLE, LLE) without misalignment,            Normal ROM of b/l LE without pain, no spinal tenderness, normal muscle strength/tone  SKIN: No rashes or ulcers noted; no subcutaneous nodules or induration palpable  NEURO: A&Ox3, CN II-XII intact; normal reflexes in upper and lower extremities, sensation intact in upper and lower extremities b/l to light touch   BACK: No spinal tenderness to palpation, no CVA tenderness   PSYCH: Appropriate insight/judgment; A+O x 3, labile mood, intermittently emotional and tearful, denies SI/HI  : leger in place draining clear urine

## 2024-04-25 NOTE — H&P ADULT - NSHPSOCIALHISTORY_GEN_ALL_CORE
Patient denies any history of smoking cigarettes, alcohol use, or recreational or illicit drug use. Lives alone. Patient denies any history of smoking cigarettes, alcohol use, or recreational or illicit drug use. Lives alone and reports that he is independent in all ADLs and iADLs

## 2024-04-25 NOTE — ED ADULT NURSE NOTE - OBJECTIVE STATEMENT
Pt is a 79 y/o M with PMH of HTN, BPH, Environmental allergies, Insomnia, Bipolar disorder, and depression BIB EMS found down in house s/p unwitnessed fall after neighbor calling. EMS endorses pt being down for 2 days found in dirty cluttered living space. Pt found hypoxic by EMS started on 6L NC oxygen. Pt denies LOC, unsure of exact mechanism. Upon assessment pt is a/o x 3 speaking coherently in full sentences with frantic emotional affect appearing tremulous. Pt is breathing slightly tachypneic and equal BL. Cardiac rhythm is Sinus Tachycardia. BL chest wall rib ttp with RL abdominal tenderness. Abdomen is soft and nondistended, skin is warm and dry. Abrasions noted on L cheek, R Lateral knee, R chest wall. Pt endorses prior SI, denies SI/HI at this moment. Denies ETOH/substance use. Pt denies fevers/chills, headaches/vision changes, chest pain/SOB, n/v/d, or changes in urinary/defecation habits. Pt is in stretcher in lowest position with side rails up.

## 2024-04-25 NOTE — H&P ADULT - ASSESSMENT
78-year-old male with a history of bipolar disorder, personality disorder with past ECT treatment presents after a fall that occurred 2 days ago while cleaning and was found on the ground by his neighbor after he was heard screaming. 78-year-old male with a history of bipolar disorder, personality disorder with past ECT treatment presents after a fall that occurred 2 days ago, course c/b AHRF requiring 6 L NC and MEREDITH iso rhabdo, admitted to medicine for further management 78-year-old male with a history of HTN, bipolar disorder, personality disorder with past ECT treatment, prostate ca s/p TURP presents after a fall that occurred 2 days ago, course c/b AHRF requiring 6 L NC and MEREDITH iso rhabdo, admitted to medicine for further management

## 2024-04-25 NOTE — ED PROVIDER NOTE - PROGRESS NOTE DETAILS
DO Donte (PGY-3): no tenderness in the midline cervical, thoracic, or lumbar spine. Spine consulted for L1 fracture.

## 2024-04-25 NOTE — H&P ADULT - PROBLEM SELECTOR PLAN 8
Pt with urinary retention in ED, leger placed    Plan:  - Monitor i's and o's  - trial of void as able Pt with urinary retention in ED, leger placed    Plan:  -c/w finasteride  - Monitor i's and o's  - trial of void as able

## 2024-04-25 NOTE — H&P ADULT - PROBLEM SELECTOR PLAN 1
Pt afebrile on admission, SIRS criteria met with tachycardia, tachypnea. Had AHRF to SpO2 80%, placed on 6L NC. CXR neg for PNA, UA collected after CTX 1 g in ED. CT A/P without focus of infection, possible urinary source. No prior hx of MDR organisms    Plan:  -c/w empiric CTX (4/25-   -f/u blood cultures and urine culture  -f/u RVP  -Monitor wbc and fever curve Pt afebrile on admission, SIRS criteria met with tachycardia, tachypnea. Had AHRF to SpO2 80%, placed on 6L NC. CXR neg for PNA, UA collected after CTX 1 g in ED. CTA Chest neg for PE. CT A/P without focus of infection, possible urinary source. No prior hx of MDR organisms    Plan:  -c/w empiric CTX (4/25-   -f/u blood cultures and urine culture  -f/u RVP  -Monitor wbc and fever curve CK 3521 iso being down for 2 days at home and inability to get up. UA with moderate blood, 2 RBCs c/w rhabdo. Pt received 2 L IV fluid bolus in ED    Plan:  -f/u repeat CK in AM to monitor for downtrend  -Monitor renal function on BMP CK 3521 iso being down for 2 days at home and inability to get up. UA with moderate blood, 2 RBCs c/w rhabdo. Pt received 2 L IV fluid bolus in ED    Plan:  -c/w  cc/hr for 10 hours  -f/u repeat CK in AM to monitor for downtrend  -Monitor renal function on BMP

## 2024-04-25 NOTE — H&P ADULT - PROBLEM SELECTOR PLAN 9
DVT ppx: heparin subq  Diet: Regular  Dispo: pending clinical improvement, PT/OT consult  GOC: FULL CODE

## 2024-04-25 NOTE — H&P ADULT - PROBLEM SELECTOR PLAN 7
BUN/Cr elevated to 73/1.52 on admission, likely prerenal iso of dehydration and rhabdo from being down for 2 days plus obstruction, likely BPH. Baseline Cr in 2022 was 0.97. CT A/P noting  no hydronephrosis, nonobstructive 0.3, 0.3, and 0.5 cm left renal calculi and nonobstructive 0.3 right renal calculi. Hyperdense L renal cyst possibly hemorrhagic cyst but indeterminant. s/p 2 L IV fluids in ED    Plan:  - monitor for improvement in SCr on BMP s/p IV fluids  - monitor i's and o's  -renally dose meds  -monitor bladder scans to r/o obstruction  -f/u renal US to better eval renal cyst  -avoid nephrotoxic agents Patient with fall at home, found down by neighbor for 2 days, reports was too weak to get up but denies LOC or head strike. CT Head: No acute intracranial hemorrhage  CTA Chest : Mildly displaced right eighth anterolateral right rib fracture. Marked height loss of the L1 vertebral body of uncertain chronicity CT cervical/thoracic/lumbar spine w IV: No acute fracture cervical spine. No acute fracture thoracic and lumbar spine. If pain persists, follow-up MRI exam recommended (if no contraindication).    Plan:  -f/u screening ECG and TTE, monitor on tele  -Fall precautions  -PT/OT consult

## 2024-04-25 NOTE — H&P ADULT - PROBLEM SELECTOR PLAN 2
Patient with fall at home, found down by neighbor for 2 days, reports was too weak to get up but denies LOC or head strike. CT Head: No acute intracranial hemorrhage  CTA Chest : Mildly displaced right eighth anterolateral right rib fracture. Marked height loss of the L1 vertebral body of uncertain chronicity CT cervical/thoracic/lumbar spine w IV: No acute fracture cervical spine. No acute fracture thoracic and lumbar spine. If pain persists, follow-up MRI exam recommended (if no contraindication).    Plan:  -f/u screening ECG and TTE  -Fall precautions  -PT/OT consult Patient with fall at home, found down by neighbor for 2 days, reports was too weak to get up but denies LOC or head strike. CT Head: No acute intracranial hemorrhage  CTA Chest : Mildly displaced right eighth anterolateral right rib fracture. Marked height loss of the L1 vertebral body of uncertain chronicity CT cervical/thoracic/lumbar spine w IV: No acute fracture cervical spine. No acute fracture thoracic and lumbar spine. If pain persists, follow-up MRI exam recommended (if no contraindication).    Plan:  -f/u screening ECG and TTE, monitor on tele  -Fall precautions  -PT/OT consult BUN/Cr elevated to 73/1.52 on admission, likely prerenal iso of dehydration and rhabdo from being down for 2 days plus obstruction, likely BPH. Baseline Cr in 2022 was 0.97. CT A/P noting  no hydronephrosis, nonobstructive 0.3, 0.3, and 0.5 cm left renal calculi and nonobstructive 0.3 right renal calculi. Hyperdense L renal cyst possibly hemorrhagic cyst but indeterminant. s/p 2 L IV fluids in ED    Plan:  - monitor for improvement in SCr on BMP s/p IV fluids  - monitor i's and o's  -renally dose meds  -monitor bladder scans to r/o obstruction  -f/u renal US to better eval renal cyst  -avoid nephrotoxic agents

## 2024-04-25 NOTE — H&P ADULT - PROBLEM SELECTOR PLAN 4
CT noting L1 vertebra loss of height, fracture of unclear chronicity. Neurosurg evaluated noting L1 schmorl’s node w/ very-very mild A3 burst fx minimally displaced, TLICS 2. CT C spine with sclerotic lesion to C3 pedicle SINS 6.    Plan:  -neurosurgery consulted, recs appreciated  -pain management w tylenol q6 prn for mild, oxy 2.5 mg q6 prn for moderate pain  -no acute neurosurgery intervention  -TLSO brace when OOB  -AP/Lat/Flex/Ex lumbar X-rays  -output f/u w/Dr. Rodrigues in 4-6W   -defer to heme/onc regarding possible w/u of C3 lesion given low SINS and lack of symptoms Patient presented with AHRF, saturation 80%, improved s/p 6 L O2, now on 2L O2. CXR negative for acute pathology. CTA negative for PE    Plan:  -monitor SpO2  -Wean O2 as tolerated Patient presented with AHRF, saturation 80%, improved s/p 6 L O2, now on 2L O2. CXR negative for acute pathology. CTA negative for PE. Likely iso poor respirations due to rib fracture    Plan:  -monitor SpO2  -Wean O2 as tolerated

## 2024-04-25 NOTE — H&P ADULT - HISTORY OF PRESENT ILLNESS
78-year-old male with a history of bipolar disorder, personality disorder with past ECT treatment presents after a fall that occurred 2 days ago while cleaning. Patient was unable to get up and was found on the ground by his neighbor after he was heard screaming. The neighbor activated EMS and pt was found to be hypoxemic to 80%, started on oxygen.  Patient denies any history of alcohol use, alcohol withdrawal, recreational or illicit drug use, fevers, chills, chest pain, nausea, vomiting, dysuria, diarrhea.      On arrival T 97.1, HR , -173/, RR 20-24, Sat 80%, placed on 6 LNC, now on 2L.   Labs notable for WBC 22.91, neutrophilic predominance 81.8%, Hb 17.8, plt 344. Na 146, BUN/Cr 73/1.52, Ca 10.8. , AST//47, CK 3521. Blood alcohol content <10. UA w 15 ketones, neg nitrites, neg leuk esterase, 0 wbc, neg bacteria, moderate blood, 2 RBCs    CXR: No evidence of acute pulmonary disease or trauma. Xray pelvis: No fracture. No dislocation.  CT Head: No acute intracranial hemorrhage  CTA Chest : Mildly displaced right eighth anterolateral right rib fracture. Marked height loss of the L1 vertebral body of uncertain chronicity but new since the previous exam of 9/25/2014. Correlate clinically for pain. No pulmonary embolism. Hyperdense left renal cyst possibly hemorrhagic cyst but remains indeterminant. Recommend nonemergent renal ultrasound. CT cervical/thoracic/lumbar spine w IV: No acute fracture cervical spine. No acute fracture thoracic and lumbar spine. If pain persists, follow-up MRI exam recommended (if no contraindication).    s/p Ceftriaxone 1 g, 2 L LR bolus, 4 mg IV morphine   78-year-old male with a history of bipolar disorder, depression, personality disorder with past ECT treatment, prostate ca s/p TURP presents after a fall that occurred 2 days ago while cleaning. Patient denies head strike or LOC but was unable to get up and was found on the ground by his neighbor after he was heard screaming. The neighbor activated EMS and pt was found to be hypoxemic to 80%, started on oxygen.  Patient denies any history of smoking, alcohol use, alcohol withdrawal, recreational or illicit drug use, fevers, chills, chest pain, nausea, vomiting, dysuria, diarrhea. Does not take any medications at home and does not have a PCP, has not seen a doctor in several years. Patient reports he lives alone, does not have a partner or living siblings.     On arrival T 97.1, HR , -173/, RR 20-24, Sat 80%, placed on 6 LNC, now on 2L.   Labs notable for WBC 22.91, neutrophilic predominance 81.8%, Hb 17.8, plt 344. Na 146, BUN/Cr 73/1.52, Ca 10.8. , AST//47, CK 3521. Blood alcohol content <10. UA w 15 ketones, neg nitrites, neg leuk esterase, 0 wbc, neg bacteria, moderate blood, 2 RBCs    CXR: No evidence of acute pulmonary disease or trauma. Xray pelvis: No fracture. No dislocation.  CT Head: No acute intracranial hemorrhage  CTA Chest : Mildly displaced right eighth anterolateral right rib fracture. Marked height loss of the L1 vertebral body of uncertain chronicity but new since the previous exam of 9/25/2014. Correlate clinically for pain. No pulmonary embolism. Hyperdense left renal cyst possibly hemorrhagic cyst but remains indeterminant. Recommend nonemergent renal ultrasound. CT cervical/thoracic/lumbar spine w IV: No acute fracture cervical spine. No acute fracture thoracic and lumbar spine. If pain persists, follow-up MRI exam recommended (if no contraindication).    s/p Ceftriaxone 1 g, 2 L LR bolus, 4 mg IV morphine in the ED   78-year-old male with a history of HTN, bipolar disorder, depression, personality disorder with past ECT treatment, prostate ca s/p TURP presents after a fall that occurred 2 days ago while cleaning. Patient denies head strike or LOC but was unable to get up and was found on the ground by his neighbor after he was heard screaming. The neighbor activated EMS and pt was found to be hypoxemic to 80%, started on oxygen.  Patient denies any history of smoking, alcohol use, alcohol withdrawal, recreational or illicit drug use, fevers, chills, chest pain, nausea, vomiting, dysuria, diarrhea. Does not take any medications at home and does not have a PCP, has not seen a doctor in several years. Patient reports he lives alone, does not have a partner or living siblings.     On arrival T 97.1, HR , -173/, RR 20-24, Sat 80%, placed on 6 LNC, now on 2L.   Labs notable for WBC 22.91, neutrophilic predominance 81.8%, Hb 17.8, plt 344. Na 146, BUN/Cr 73/1.52, Ca 10.8. , AST//47, CK 3521. Blood alcohol content <10. UA w 15 ketones, neg nitrites, neg leuk esterase, 0 wbc, neg bacteria, moderate blood, 2 RBCs    CXR: No evidence of acute pulmonary disease or trauma. Xray pelvis: No fracture. No dislocation.  CT Head: No acute intracranial hemorrhage  CTA Chest : Mildly displaced right eighth anterolateral right rib fracture. Marked height loss of the L1 vertebral body of uncertain chronicity but new since the previous exam of 9/25/2014. Correlate clinically for pain. No pulmonary embolism. Hyperdense left renal cyst possibly hemorrhagic cyst but remains indeterminant. Recommend nonemergent renal ultrasound. CT cervical/thoracic/lumbar spine w IV: No acute fracture cervical spine. No acute fracture thoracic and lumbar spine. If pain persists, follow-up MRI exam recommended (if no contraindication).    s/p Ceftriaxone 1 g, 2 L LR bolus, 4 mg IV morphine in the ED

## 2024-04-25 NOTE — ED PROVIDER NOTE - CLINICAL SUMMARY MEDICAL DECISION MAKING FREE TEXT BOX
78-year-old male with a past medical history of bipolar disorder, personality disorder with past ECT treatment presenting after a fall. Patient fell on the ground 2 days ago while cleaning. Patient has been on the ground for the last 2 days. Neighbor heard him screaming and called 911. Patient had to be hypoxemic and started on oxygen. Physical exam reveals right lower quadrant abdominal tenderness, bilateral chest wall tenderness. Patient does not have C-spine tenderness. Lungs clear to auscultation. Trauma scans, PE study, CK.

## 2024-04-26 ENCOUNTER — RESULT REVIEW (OUTPATIENT)
Age: 79
End: 2024-04-26

## 2024-04-26 ENCOUNTER — TRANSCRIPTION ENCOUNTER (OUTPATIENT)
Age: 79
End: 2024-04-26
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
## 2024-04-26 DIAGNOSIS — S32.009A UNSPECIFIED FRACTURE OF UNSPECIFIED LUMBAR VERTEBRA, INITIAL ENCOUNTER FOR CLOSED FRACTURE: ICD-10-CM

## 2024-04-26 DIAGNOSIS — N17.9 ACUTE KIDNEY FAILURE, UNSPECIFIED: ICD-10-CM

## 2024-04-26 DIAGNOSIS — R65.10 SYSTEMIC INFLAMMATORY RESPONSE SYNDROME (SIRS) OF NON-INFECTIOUS ORIGIN WITHOUT ACUTE ORGAN DYSFUNCTION: ICD-10-CM

## 2024-04-26 DIAGNOSIS — R33.9 RETENTION OF URINE, UNSPECIFIED: ICD-10-CM

## 2024-04-26 DIAGNOSIS — J96.01 ACUTE RESPIRATORY FAILURE WITH HYPOXIA: ICD-10-CM

## 2024-04-26 DIAGNOSIS — M62.82 RHABDOMYOLYSIS: ICD-10-CM

## 2024-04-26 DIAGNOSIS — Z29.9 ENCOUNTER FOR PROPHYLACTIC MEASURES, UNSPECIFIED: ICD-10-CM

## 2024-04-26 DIAGNOSIS — S22.39XA FRACTURE OF ONE RIB, UNSPECIFIED SIDE, INITIAL ENCOUNTER FOR CLOSED FRACTURE: ICD-10-CM

## 2024-04-26 LAB
ALBUMIN SERPL ELPH-MCNC: 3.9 G/DL — SIGNIFICANT CHANGE UP (ref 3.3–5)
ALP SERPL-CCNC: 92 U/L — SIGNIFICANT CHANGE UP (ref 40–120)
ALT FLD-CCNC: 87 U/L — HIGH (ref 10–45)
ANION GAP SERPL CALC-SCNC: 15 MMOL/L — SIGNIFICANT CHANGE UP (ref 5–17)
AST SERPL-CCNC: 108 U/L — HIGH (ref 10–40)
BASE EXCESS BLDV CALC-SCNC: 1 MMOL/L — SIGNIFICANT CHANGE UP (ref -2–3)
BASOPHILS # BLD AUTO: 0.04 K/UL — SIGNIFICANT CHANGE UP (ref 0–0.2)
BASOPHILS NFR BLD AUTO: 0.2 % — SIGNIFICANT CHANGE UP (ref 0–2)
BILIRUB SERPL-MCNC: 0.9 MG/DL — SIGNIFICANT CHANGE UP (ref 0.2–1.2)
BUN SERPL-MCNC: 67 MG/DL — HIGH (ref 7–23)
CA-I SERPL-SCNC: 1.27 MMOL/L — SIGNIFICANT CHANGE UP (ref 1.15–1.33)
CALCIUM SERPL-MCNC: 9.7 MG/DL — SIGNIFICANT CHANGE UP (ref 8.4–10.5)
CHLORIDE BLDV-SCNC: 106 MMOL/L — SIGNIFICANT CHANGE UP (ref 96–108)
CHLORIDE SERPL-SCNC: 105 MMOL/L — SIGNIFICANT CHANGE UP (ref 96–108)
CK SERPL-CCNC: 1652 U/L — HIGH (ref 30–200)
CO2 BLDV-SCNC: 29 MMOL/L — HIGH (ref 22–26)
CO2 SERPL-SCNC: 23 MMOL/L — SIGNIFICANT CHANGE UP (ref 22–31)
CREAT SERPL-MCNC: 1.48 MG/DL — HIGH (ref 0.5–1.3)
CULTURE RESULTS: NO GROWTH — SIGNIFICANT CHANGE UP
EGFR: 48 ML/MIN/1.73M2 — LOW
EOSINOPHIL # BLD AUTO: 0.16 K/UL — SIGNIFICANT CHANGE UP (ref 0–0.5)
EOSINOPHIL NFR BLD AUTO: 0.8 % — SIGNIFICANT CHANGE UP (ref 0–6)
FLUAV AG NPH QL: SIGNIFICANT CHANGE UP
FLUBV AG NPH QL: SIGNIFICANT CHANGE UP
GAS PNL BLDV: 139 MMOL/L — SIGNIFICANT CHANGE UP (ref 136–145)
GAS PNL BLDV: SIGNIFICANT CHANGE UP
GLUCOSE BLDV-MCNC: 108 MG/DL — HIGH (ref 70–99)
GLUCOSE SERPL-MCNC: 114 MG/DL — HIGH (ref 70–99)
HCO3 BLDV-SCNC: 28 MMOL/L — SIGNIFICANT CHANGE UP (ref 22–29)
HCT VFR BLD CALC: 46.2 % — SIGNIFICANT CHANGE UP (ref 39–50)
HCT VFR BLDA CALC: 47 % — SIGNIFICANT CHANGE UP (ref 39–51)
HGB BLD CALC-MCNC: 15.8 G/DL — SIGNIFICANT CHANGE UP (ref 12.6–17.4)
HGB BLD-MCNC: 15.6 G/DL — SIGNIFICANT CHANGE UP (ref 13–17)
IMM GRANULOCYTES NFR BLD AUTO: 0.9 % — SIGNIFICANT CHANGE UP (ref 0–0.9)
LACTATE BLDV-MCNC: 1.1 MMOL/L — SIGNIFICANT CHANGE UP (ref 0.5–2)
LYMPHOCYTES # BLD AUTO: 1.52 K/UL — SIGNIFICANT CHANGE UP (ref 1–3.3)
LYMPHOCYTES # BLD AUTO: 7.9 % — LOW (ref 13–44)
MAGNESIUM SERPL-MCNC: 2.5 MG/DL — SIGNIFICANT CHANGE UP (ref 1.6–2.6)
MCHC RBC-ENTMCNC: 33.8 GM/DL — SIGNIFICANT CHANGE UP (ref 32–36)
MCHC RBC-ENTMCNC: 33.8 PG — SIGNIFICANT CHANGE UP (ref 27–34)
MCV RBC AUTO: 100 FL — SIGNIFICANT CHANGE UP (ref 80–100)
MONOCYTES # BLD AUTO: 2.7 K/UL — HIGH (ref 0–0.9)
MONOCYTES NFR BLD AUTO: 14 % — SIGNIFICANT CHANGE UP (ref 2–14)
NEUTROPHILS # BLD AUTO: 14.76 K/UL — HIGH (ref 1.8–7.4)
NEUTROPHILS NFR BLD AUTO: 76.2 % — SIGNIFICANT CHANGE UP (ref 43–77)
NRBC # BLD: 0 /100 WBCS — SIGNIFICANT CHANGE UP (ref 0–0)
PCO2 BLDV: 50 MMHG — SIGNIFICANT CHANGE UP (ref 42–55)
PH BLDV: 7.35 — SIGNIFICANT CHANGE UP (ref 7.32–7.43)
PHOSPHATE SERPL-MCNC: 3.2 MG/DL — SIGNIFICANT CHANGE UP (ref 2.5–4.5)
PLATELET # BLD AUTO: 270 K/UL — SIGNIFICANT CHANGE UP (ref 150–400)
PO2 BLDV: 36 MMHG — SIGNIFICANT CHANGE UP (ref 25–45)
POTASSIUM BLDV-SCNC: 4.1 MMOL/L — SIGNIFICANT CHANGE UP (ref 3.5–5.1)
POTASSIUM SERPL-MCNC: 4 MMOL/L — SIGNIFICANT CHANGE UP (ref 3.5–5.3)
POTASSIUM SERPL-SCNC: 4 MMOL/L — SIGNIFICANT CHANGE UP (ref 3.5–5.3)
PROT SERPL-MCNC: 6.9 G/DL — SIGNIFICANT CHANGE UP (ref 6–8.3)
RBC # BLD: 4.62 M/UL — SIGNIFICANT CHANGE UP (ref 4.2–5.8)
RBC # FLD: 13.3 % — SIGNIFICANT CHANGE UP (ref 10.3–14.5)
RSV RNA NPH QL NAA+NON-PROBE: SIGNIFICANT CHANGE UP
SAO2 % BLDV: 59.6 % — LOW (ref 67–88)
SARS-COV-2 RNA SPEC QL NAA+PROBE: SIGNIFICANT CHANGE UP
SODIUM SERPL-SCNC: 143 MMOL/L — SIGNIFICANT CHANGE UP (ref 135–145)
SPECIMEN SOURCE: SIGNIFICANT CHANGE UP
WBC # BLD: 19.35 K/UL — HIGH (ref 3.8–10.5)
WBC # FLD AUTO: 19.35 K/UL — HIGH (ref 3.8–10.5)

## 2024-04-26 PROCEDURE — 93306 TTE W/DOPPLER COMPLETE: CPT | Mod: 26

## 2024-04-26 PROCEDURE — 93010 ELECTROCARDIOGRAM REPORT: CPT

## 2024-04-26 PROCEDURE — 99232 SBSQ HOSP IP/OBS MODERATE 35: CPT

## 2024-04-26 PROCEDURE — 76775 US EXAM ABDO BACK WALL LIM: CPT | Mod: 26

## 2024-04-26 RX ORDER — FINASTERIDE 5 MG/1
5 TABLET, FILM COATED ORAL DAILY
Refills: 0 | Status: DISCONTINUED | OUTPATIENT
Start: 2024-04-26 | End: 2024-05-08

## 2024-04-26 RX ORDER — ONDANSETRON 8 MG/1
4 TABLET, FILM COATED ORAL EVERY 8 HOURS
Refills: 0 | Status: DISCONTINUED | OUTPATIENT
Start: 2024-04-26 | End: 2024-04-26

## 2024-04-26 RX ORDER — LANOLIN ALCOHOL/MO/W.PET/CERES
3 CREAM (GRAM) TOPICAL AT BEDTIME
Refills: 0 | Status: DISCONTINUED | OUTPATIENT
Start: 2024-04-26 | End: 2024-04-26

## 2024-04-26 RX ORDER — HEPARIN SODIUM 5000 [USP'U]/ML
5000 INJECTION INTRAVENOUS; SUBCUTANEOUS EVERY 8 HOURS
Refills: 0 | Status: DISCONTINUED | OUTPATIENT
Start: 2024-04-26 | End: 2024-05-08

## 2024-04-26 RX ORDER — ACETAMINOPHEN 500 MG
650 TABLET ORAL EVERY 6 HOURS
Refills: 0 | Status: DISCONTINUED | OUTPATIENT
Start: 2024-04-26 | End: 2024-05-08

## 2024-04-26 RX ORDER — OXYCODONE HYDROCHLORIDE 5 MG/1
5 TABLET ORAL ONCE
Refills: 0 | Status: DISCONTINUED | OUTPATIENT
Start: 2024-04-26 | End: 2024-04-26

## 2024-04-26 RX ORDER — SODIUM CHLORIDE 9 MG/ML
1000 INJECTION INTRAMUSCULAR; INTRAVENOUS; SUBCUTANEOUS
Refills: 0 | Status: DISCONTINUED | OUTPATIENT
Start: 2024-04-26 | End: 2024-04-30

## 2024-04-26 RX ORDER — OXYCODONE HYDROCHLORIDE 5 MG/1
2.5 TABLET ORAL EVERY 6 HOURS
Refills: 0 | Status: DISCONTINUED | OUTPATIENT
Start: 2024-04-26 | End: 2024-04-29

## 2024-04-26 RX ORDER — CEFTRIAXONE 500 MG/1
1000 INJECTION, POWDER, FOR SOLUTION INTRAMUSCULAR; INTRAVENOUS EVERY 24 HOURS
Refills: 0 | Status: DISCONTINUED | OUTPATIENT
Start: 2024-04-26 | End: 2024-04-27

## 2024-04-26 RX ADMIN — OXYCODONE HYDROCHLORIDE 5 MILLIGRAM(S): 5 TABLET ORAL at 03:50

## 2024-04-26 RX ADMIN — OXYCODONE HYDROCHLORIDE 5 MILLIGRAM(S): 5 TABLET ORAL at 02:50

## 2024-04-26 RX ADMIN — FINASTERIDE 5 MILLIGRAM(S): 5 TABLET, FILM COATED ORAL at 12:25

## 2024-04-26 RX ADMIN — HEPARIN SODIUM 5000 UNIT(S): 5000 INJECTION INTRAVENOUS; SUBCUTANEOUS at 13:10

## 2024-04-26 RX ADMIN — OXYCODONE HYDROCHLORIDE 2.5 MILLIGRAM(S): 5 TABLET ORAL at 12:25

## 2024-04-26 RX ADMIN — OXYCODONE HYDROCHLORIDE 2.5 MILLIGRAM(S): 5 TABLET ORAL at 12:55

## 2024-04-26 RX ADMIN — HEPARIN SODIUM 5000 UNIT(S): 5000 INJECTION INTRAVENOUS; SUBCUTANEOUS at 21:08

## 2024-04-26 RX ADMIN — HEPARIN SODIUM 5000 UNIT(S): 5000 INJECTION INTRAVENOUS; SUBCUTANEOUS at 04:41

## 2024-04-26 RX ADMIN — SODIUM CHLORIDE 100 MILLILITER(S): 9 INJECTION INTRAMUSCULAR; INTRAVENOUS; SUBCUTANEOUS at 06:27

## 2024-04-26 RX ADMIN — CEFTRIAXONE 100 MILLIGRAM(S): 500 INJECTION, POWDER, FOR SOLUTION INTRAMUSCULAR; INTRAVENOUS at 20:04

## 2024-04-26 NOTE — OCCUPATIONAL THERAPY INITIAL EVALUATION ADULT - ADDITIONAL COMMENTS
Pt. lives in apartment alone. pt. reports approx.. 20 steps to apartment. PTA pt. fully independent in ADL/IADLs and ambulates without assistive device.

## 2024-04-26 NOTE — CONSULT NOTE ADULT - ASSESSMENT
Impression:    Left chest wall contusion  Left face abrasion  Bilateral knees abrasion  Incontinence of bowel and bladder  Incontinence Dermatitis  Bilateral groin/scrotum/penis moisture dermatitis  Pressure injury Prophylaxis    Recommend:  1.) topical therapy: sacral/bilateral buttock/bilateral groins/scrotum/penis - cleanse with incontinence cleanser, pat dry, apply Surjit antifungal ointment BID and PRN for incontinent episodes  Left chest wall, left face, bilateral knee abrasions - keep clean and dry  2.) Incontinence Management - incontinence cleanser, pads, pericare BID  3.) Maintain on an alternating air with low air loss surface  4.) Turn and reposition Q 2 hours  5.) Nutrition optimization - please add Ashish  6.) Offload heels/feet with complete cair air fluidized boots; ensure that the soles of the feet are not resting on the foot board of the bed.    Care as per medicine. Will not actively follow but will remain available. Please recall for new issues or deterioration.  Upon discharge [facility] [address] [place] [address] [phone]  Thank you for this consult  Seen and discussed with clinical nurse  Rosie Woods, PHYLLIS-C, CWOCN via TEAMS
Malina, Jumana  78M Hx BPD/depression s/p ECT/htn/ProstateCa s/p TURP p/f mech fall unable to get up last 2d. CTH wnl. CT L spine w/ moderate degenerative changes, L1 schmorl’s node w/ very-very mild A3 burst fx minimally displaced, TLICS 2. CT C spine with sclerotic lesion to C3 pedicle SINS 6. Also has rib fx, wbc 22, metabolic acidosis   Exam: neurointact outside of L delt 4/5 pain limited from rib fx  -no acute neurosurgery intervention  -TLSO when OOB  -AP/Lat/Flex/Ex lumbar X-rays  -output f/u w/Dr. Rodrigues in 4-6W   -defer to heme/onc regarding possible w/u of C3 lesion given low SINS and lack of symptoms

## 2024-04-26 NOTE — DISCHARGE NOTE PROVIDER - CARE PROVIDER_API CALL
Ajay Rodrigues Ramon  Neurosurgery  [address] [facility], [address]  [place] [address]  Phone: [phone]  Fax: [phone]  Follow Up Time: 1 month

## 2024-04-26 NOTE — DISCHARGE NOTE PROVIDER - HOSPITAL COURSE
HPI:  78-year-old male with a history of HTN, bipolar disorder, depression, personality disorder with past ECT treatment, prostate ca s/p TURP presents after a fall that occurred 2 days ago while cleaning. Patient denies head strike or LOC but was unable to get up and was found on the ground by his neighbor after he was heard screaming. The neighbor activated EMS and pt was found to be hypoxemic to 80%, started on oxygen.  Patient denies any history of smoking, alcohol use, alcohol withdrawal, recreational or illicit drug use, fevers, chills, chest pain, nausea, vomiting, dysuria, diarrhea. Does not take any medications at home and does not have a PCP, has not seen a doctor in several years. Patient reports he lives alone, does not have a partner or living siblings.     On arrival T 97.1, HR , -173/, RR 20-24, Sat 80%, placed on 6 LNC, now on 2L.   Labs notable for WBC 22.91, neutrophilic predominance 81.8%, Hb 17.8, plt 344. Na 146, BUN/Cr 73/1.52, Ca 10.8. , AST//47, CK 3521. Blood alcohol content <10. UA w 15 ketones, neg nitrites, neg leuk esterase, 0 wbc, neg bacteria, moderate blood, 2 RBCs    CXR: No evidence of acute pulmonary disease or trauma. Xray pelvis: No fracture. No dislocation.  CT Head: No acute intracranial hemorrhage  CTA Chest : Mildly displaced right eighth anterolateral right rib fracture. Marked height loss of the L1 vertebral body of uncertain chronicity but new since the previous exam of 9/25/2014. Correlate clinically for pain. No pulmonary embolism. Hyperdense left renal cyst possibly hemorrhagic cyst but remains indeterminant. Recommend nonemergent renal ultrasound. CT cervical/thoracic/lumbar spine w IV: No acute fracture cervical spine. No acute fracture thoracic and lumbar spine. If pain persists, follow-up MRI exam recommended (if no contraindication).    s/p Ceftriaxone 1 g, 2 L LR bolus, 4 mg IV morphine in the ED   (25 Apr 2024 23:27)    Hospital Course:  77 yo M PMH HTN, bipolar disorder, personality disorder with past ECT treatment, prostate ca s/p TURP presents after a fall adm with [facility] due to rhabdo, R rib fracture, and hospital course c/b AHRF requiring 6 L NC, now resolved awaiting placement.       Problem/Plan - 1:  ·  Problem: Fall.   ·  Plan: CT w/ Mildly displaced right eighth anterolateral right rib fracture.  Marked height loss of the L1 vertebral body of uncertain chronicity  TTE normal EF  - PT rec for [facility] most recently. Acute rehab request denied by insurance, I spoke w/ Dr Hogue for [id number] today. Plan for [facility] placement vs home with private hire.     Problem/Plan - 2:  ·  Problem: Acute renal failure due to rhabdomyolysis.   ·  Plan: resolved s/p IVF  prerenal iso of dehydration and rhabdo from being down for 2 days plus obstruction, likely BPH  Renal u/s confirms ~2 cm hemorrhagic cyst seen on CT  - avoid nephrotoxic agents  - outpt follow up for renal cyst.     Problem/Plan - 3:  ·  Problem: Acute hypoxic respiratory failure.   ·  Plan: resolved  CXR negative for acute pathology. CTA negative for PE. Likely atelectasis 2.2 poor respirations due to rib fracture  - weaned off 6L O2 now on RA.     Problem/Plan - 4:  ·  Problem: Rib fracture.   ·  Plan: due to fall  - tylenol prn, add lidocaine patch daily.     Problem/Plan - 5:  ·  Problem: Lumbar vertebral fracture.   ·  Plan: CT noting L1 vertebra loss of height, fracture of unclear chronicity. Neurosurg evaluated noting L1 schmorl’s node w/ very-very mild A3 burst fx minimally displaced, TLICS 2. CT C spine with sclerotic lesion to C3 pedicle SINS 6.  - neurosurgery consulted, recs appreciated, no acute neurosurgery intervention  - TLSO brace when OOB  - output f/u w/Dr. Rodrigues in 4-6W   - outpt follow w/ PCP re: C3 lesion.     Problem/Plan - 6:  ·  Problem: Urinary retention.   ·  Plan: failed TOV, required cystoscopically placed leger per urology and found then to have two 1 cm stones likely causing obstruction  - c/w finasteride  - repeat TOV today  - outpt urology follow up  - Monitor i's and o's.     Problem/Plan - 7:  ·  Problem: Hypertension.   ·  Plan: controlled  - c/w lisinopril 5 and nifedipine 30 mg daily.     Problem/Plan - 8:  ·  Problem: Prophylactic measure.   ·  Plan: DVT ppx: heparin subq  Dispo: DC planning per CM to [facility] vs home w/ private hire. Pt's insurance denied acute rehab as he is able to ambulate 100 ft and does not have qualifying diagnosis. He is medically clear for discharge.    Advanced Directives:   [x] Full code  [ ] DNR  [ ] Hospice    Discharge Diagnoses:  (Admit Diagnosis) Rhabdomyolysis  (PMH) HTN (hypertension)  (PMH) Depression  (PMH) Insomnia  (PMH) Environmental Allergies  (PMH) BPH (Benign Prostatic Hypertrophy)  (Principal DC/DX) Rhabdomyolysis  (Problem/DX) Hypertension       HPI:  78-year-old male with a history of HTN, bipolar disorder, depression, personality disorder with past ECT treatment, prostate ca s/p TURP presents after a fall that occurred 2 days ago while cleaning. Patient denies head strike or LOC but was unable to get up and was found on the ground by his neighbor after he was heard screaming. The neighbor activated EMS and pt was found to be hypoxemic to 80%, started on oxygen.  Patient denies any history of smoking, alcohol use, alcohol withdrawal, recreational or illicit drug use, fevers, chills, chest pain, nausea, vomiting, dysuria, diarrhea. Does not take any medications at home and does not have a PCP, has not seen a doctor in several years. Patient reports he lives alone, does not have a partner or living siblings.     On arrival T 97.1, HR , -173/, RR 20-24, Sat 80%, placed on 6 LNC, now on 2L.   Labs notable for WBC 22.91, neutrophilic predominance 81.8%, Hb 17.8, plt 344. Na 146, BUN/Cr 73/1.52, Ca 10.8. , AST//47, CK 3521. Blood alcohol content <10. UA w 15 ketones, neg nitrites, neg leuk esterase, 0 wbc, neg bacteria, moderate blood, 2 RBCs    CXR: No evidence of acute pulmonary disease or trauma. Xray pelvis: No fracture. No dislocation.  CT Head: No acute intracranial hemorrhage  CTA Chest : Mildly displaced right eighth anterolateral right rib fracture. Marked height loss of the L1 vertebral body of uncertain chronicity but new since the previous exam of 9/25/2014. Correlate clinically for pain. No pulmonary embolism. Hyperdense left renal cyst possibly hemorrhagic cyst but remains indeterminant. Recommend nonemergent renal ultrasound. CT cervical/thoracic/lumbar spine w IV: No acute fracture cervical spine. No acute fracture thoracic and lumbar spine. If pain persists, follow-up MRI exam recommended (if no contraindication).    s/p Ceftriaxone 1 g, 2 L LR bolus, 4 mg IV morphine in the ED   (25 Apr 2024 23:27)    Hospital Course:  79 yo M PMH HTN, bipolar disorder, personality disorder with past ECT treatment, prostate ca s/p TURP presents after a fall adm with [facility] due to rhabdo, R rib fracture, and hospital course c/b AHRF requiring 6 L NC, now resolved awaiting placement.  CT w/ Mildly displaced right eighth anterolateral right rib fracture.  Marked height loss of the L1 vertebral body of uncertain chronicity  PT rec for [facility] most recently. Acute rehab request denied by insurance. Plan for [facility] placement - patient is agreeable and received [facility] bed after authorization.     Acute renal failure due to rhabdomyolysis.   resolved s/p IVF  prerenal iso of dehydration and rhabdo from being down for 2 days plus obstruction, likely BPH  Renal u/s confirms ~2 cm hemorrhagic cyst seen on CT  - outpt follow up for renal cyst.    Acute hypoxic respiratory failure, resolved, was on 6L O2 now weaned to RA  CXR negative for acute pathology. CTA negative for PE. Likely atelectasis 2/2 poor respirations due to rib fracture    Lumbar vertebral fracture.    CT noting L1 vertebra loss of height, fracture of unclear chronicity. Neurosurg evaluated noting L1 schmorl’s node w/ very-very mild A3 burst fx minimally displaced, TLICS 2. CT C spine with sclerotic lesion to C3 pedicle SINS 6.  - neurosurgery consulted, recs appreciated, no acute neurosurgery intervention  - TLSO brace when OOB  - output f/u w/Dr. Rodrigues in 4-6W   - outpt follow w/ PCP re: C3 lesion.     Urinary retention.   failed TOV previously required cystoscopically placed leger per urology and found then to have two 1 cm stones likely causing obstruction  now passed TOV 5/6   - c/w finasteride  - c/w flomax 0.4 mg daily added this admission  - serial bladder scans prn  - f/u outpt for known hx of prostate ca - PSA high here    Advanced Directives:   [x] Full code  [ ] DNR  [ ] Hospice    Discharge Diagnoses:  Rhabdomyolysis, pre-renal MEREDITH  urine retention  lumbar vertebral fracture  leukocytosis resolved  hemorrhagic renal cyst  Acute hypoxic respiratory failure,

## 2024-04-26 NOTE — PROGRESS NOTE ADULT - PROBLEM SELECTOR PLAN 5
CTA Chest : Mildly displaced right eighth anterolateral right rib fracture, with limited pain    Plan:  -pain regimen as above  -continue to monitor.

## 2024-04-26 NOTE — PROGRESS NOTE ADULT - PROBLEM SELECTOR PLAN 6
CT noting L1 vertebra loss of height, fracture of unclear chronicity. Neurosurg evaluated noting L1 schmorl’s node w/ very-very mild A3 burst fx minimally displaced, TLICS 2. CT C spine with sclerotic lesion to C3 pedicle SINS 6.    Plan:  -neurosurgery consulted, recs appreciated  -pain management w tylenol q6 prn for mild, oxy 2.5 mg q6 prn for moderate pain  -no acute neurosurgery intervention  -TLSO brace when OOB  -AP/Lat/Flex/Ex lumbar X-rays  -output f/u w/Dr. Rodrigues in 4-6W   -defer to heme/onc regarding possible w/u of C3 lesion given low SINS and lack of symptoms.

## 2024-04-26 NOTE — PROGRESS NOTE ADULT - PROBLEM SELECTOR PLAN 9
DVT ppx: heparin subq  Diet: Regular  Dispo: pending clinical improvement, PT/OT consult  GOC: FULL CODE.

## 2024-04-26 NOTE — PROGRESS NOTE ADULT - PROBLEM SELECTOR PLAN 8
Pt with urinary retention in ED, leger placed.    Plan:  -c/w finasteride  - Monitor i's and o's  - trial of void as able

## 2024-04-26 NOTE — PROGRESS NOTE ADULT - PROBLEM SELECTOR PLAN 7
Patient with fall at home, found down by neighbor for 2 days, reports was too weak to get up but denies LOC or head strike. CT Head: No acute intracranial hemorrhage  CTA Chest : Mildly displaced right eighth anterolateral right rib fracture. Marked height loss of the L1 vertebral body of uncertain chronicity CT cervical/thoracic/lumbar spine w IV: No acute fracture cervical spine. No acute fracture thoracic and lumbar spine. If pain persists, follow-up MRI exam recommended (if no contraindication).    Plan:  -f/u screening ECG and TTE, monitor on tele  -Fall precautions  -PT/OT consult.

## 2024-04-26 NOTE — OCCUPATIONAL THERAPY INITIAL EVALUATION ADULT - BED MOBILITY TRAINING, PT EVAL
GOAL: pt. will perform bed mobility independently while maintaining spinal precautions 100% of the time

## 2024-04-26 NOTE — DISCHARGE NOTE PROVIDER - NSDCFUSCHEDAPPT_GEN_ALL_CORE_FT
Ajay Rodrigues  [facility]  NEUROSURG [address] [place] [address]  Scheduled Appointment: 06/05/2024

## 2024-04-26 NOTE — DISCHARGE NOTE PROVIDER - NSDCCPCAREPLAN_GEN_ALL_CORE_FT
PRINCIPAL DISCHARGE DIAGNOSIS  Diagnosis: Fall  Assessment and Plan of Treatment: CT Head within normal limits.   CT Lumbar spine with moderate degenerative changes, L1 schmorl’s node w/ very-very mild A3 burst fx minimally displaced.   CT cervial spine with sclerotic lesion to C3 pedicle.   CT w/ Mildly displaced right eighth anterolateral right rib fracture.  Neurosurgery stating no intervention follow up with Dr. Rodrigues as an outpatient.  Use TLSO brace.  Follow up with primary care doctor regarding C3 lesion.   Echocardiogram shows normal ejection fraction.         SECONDARY DISCHARGE DIAGNOSES  Diagnosis: Acute renal failure due to rhabdomyolysis  Assessment and Plan of Treatment: Acute renal failure due to rhabdomyolysis.   Resolved after receiving IV fluids  Renal u/s confirms ~2 cm hemorrhagic cyst seen on CT  Follow up outpatient regarding renal cyst.    Diagnosis: Urinary retention  Assessment and Plan of Treatment: Follow up with urology as an outpatient.

## 2024-04-26 NOTE — PROGRESS NOTE ADULT - PROBLEM SELECTOR PLAN 2
BUN/Cr elevated to 73/1.52 on admission, likely prerenal iso of dehydration and rhabdo from being down for 2 days plus obstruction, likely BPH. Baseline Cr in 2022 was 0.97. CT A/P noting  no hydronephrosis, nonobstructive 0.3, 0.3, and 0.5 cm left renal calculi and nonobstructive 0.3 right renal calculi. Hyperdense L renal cyst possibly hemorrhagic cyst but indeterminant. s/p 2 L IV fluids in ED    Plan:  - monitor for improvement in SCr on BMP s/p IV fluids  - monitor i's and o's  -renally dose meds  -monitor bladder scans to r/o obstruction.  -f/u renal US to better eval renal cyst  -avoid nephrotoxic agents

## 2024-04-26 NOTE — OCCUPATIONAL THERAPY INITIAL EVALUATION ADULT - PERTINENT HX OF CURRENT PROBLEM, REHAB EVAL
78-year-old male with a history of HTN, bipolar disorder, depression, personality disorder with past ECT treatment, prostate ca s/p TURP presents after a fall that occurred 2 days ago while cleaning. Patient denies head strike or LOC but was unable to get up and was found on the ground by his neighbor after he was heard screaming. The neighbor activated EMS and pt was found to be hypoxemic to 80%, started on oxygen.Patient reports he lives alone, does not have a partner or living siblings. found to have rhabdo c/b MEREDITH, rib fx c/f AHRF, and vertebral fx. TLSO brace when OOB.     X-ray pelvis: No fracture. No dislocation.  Xray- chest No evidence of acute pulmonary disease or trauma.  CT lumbar spine No acute intracranial hemorrhageNo acute fracture cervical spine. If pain persists, follow-up MRI exam recommended (if no contraindication).No acute fracture thoracic and lumbar spine. If pain persists, follow-up MRI exam recommended (if no contraindication).  CT Chest: Mildly displaced right eighth anterolateral right rib fracture. Marked   height loss of the L1 vertebral body No pulmonary embolism. 78-year-old male with a history of HTN, bipolar disorder, depression, personality disorder with past ECT treatment, prostate ca s/p TURP presents after a fall that occurred 2 days ago while cleaning. Patient denies head strike or LOC but was unable to get up and was found on the ground by his neighbor after he was heard screaming. The neighbor activated EMS and pt was found to be hypoxemic to 80%, started on oxygen.Patient reports he lives alone, does not have a partner or living siblings. found to have rhabdo c/b MEREDITH, rib fx c/f AHRF, and vertebral fx admitted to medicine for further management. TLSO brace when OOB.     X-ray pelvis: No fracture. No dislocation.  Xray- chest No evidence of acute pulmonary disease or trauma.  CT lumbar spine No acute intracranial hemorrhageNo acute fracture cervical spine. If pain persists, follow-up MRI exam recommended (if no contraindication).No acute fracture thoracic and lumbar spine. If pain persists, follow-up MRI exam recommended (if no contraindication).  CT Chest: Mildly displaced right eighth anterolateral right rib fracture. Marked   height loss of the L1 vertebral body No pulmonary embolism. 78-year-old male with a history of HTN, bipolar disorder, depression, personality disorder with past ECT treatment, prostate ca s/p TURP presents after a fall that occurred 2 days ago while cleaning. Patient denies head strike or LOC but was unable to get up and was found on the ground by his neighbor after he was heard screaming. The neighbor activated EMS and pt was found to be hypoxemic to 80%, started on oxygen.Patient reports he lives alone, does not have a partner or living siblings. found to have rhabdo c/b MEREDITH, rib fx c/f AHRF, and vertebral fx admitted to medicine for further management. TLSO brace when OOB.   X-ray pelvis: No fracture. No dislocation.  Xray- chest No evidence of acute pulmonary disease or trauma.  CT lumbar spine No acute intracranial hemorrhageNo acute fracture cervical spine. If pain persists, follow-up MRI exam recommended (if no contraindication).No acute fracture thoracic and lumbar spine. If pain persists, follow-up MRI exam recommended (if no contraindication).  CT Chest: Mildly displaced right eighth anterolateral right rib fracture. Marked   height loss of the L1 vertebral body No pulmonary embolism.

## 2024-04-26 NOTE — DISCHARGE NOTE PROVIDER - NSDCFUADDAPPT_GEN_ALL_CORE_FT
Left chest wall contusion  Left face abrasion  Bilateral knees abrasion  Incontinence of bowel and bladder  Incontinence Dermatitis  Bilateral groin/scrotum/penis moisture dermatitis  Pressure injury Prophylaxis    Recommend:  1.) topical therapy: sacral/bilateral buttock/bilateral groins/scrotum/penis - cleanse with incontinence cleanser, pat dry, apply Surjit antifungal ointment two time a day and as needed for incontinent episodes  Left chest wall, left face, bilateral knee abrasions - keep clean and dry  2.) Incontinence Management - incontinence cleanser, pads, pericare two times a day  3.) Maintain on an alternating air with low air loss surface  4.) Turn and reposition Q 2 hours  5.) Nutrition optimization - please add Ashish  6.) Offload heels/feet with complete cair air fluidized boots; ensure that the soles of the feet are not resting on the foot board of the bed.        Upon discharge f/u as outpatient at [facility] [address] [facility] [phone] APPTS ARE READY TO BE MADE: [x] YES    Best Family or Patient Contact (if needed):    Additional Information about above appointments (if needed):    1: Neurosurgery  2: Internal Medicine  3: Urology  4: Wound Care    Other comments or requests:       Left chest wall contusion  Left face abrasion  Bilateral knees abrasion  Incontinence of bowel and bladder  Incontinence Dermatitis  Bilateral groin/scrotum/penis moisture dermatitis  Pressure injury Prophylaxis    Recommend:  1.) topical therapy: sacral/bilateral buttock/bilateral groins/scrotum/penis - cleanse with incontinence cleanser, pat dry, apply Surjit antifungal ointment two time a day and as needed for incontinent episodes  Left chest wall, left face, bilateral knee abrasions - keep clean and dry  2.) Incontinence Management - incontinence cleanser, pads, pericare two times a day  3.) Maintain on an alternating air with low air loss surface  4.) Turn and reposition Q 2 hours  5.) Nutrition optimization - please add Ashish  6.) Offload heels/feet with complete cair air fluidized boots; ensure that the soles of the feet are not resting on the foot board of the bed.        Upon discharge f/u as outpatient at [facility] [address] [facility] [phone]

## 2024-04-26 NOTE — PROGRESS NOTE ADULT - PROBLEM SELECTOR PLAN 1
CK 3521 iso being down for 2 days at home and inability to get up. UA with moderate blood, 2 RBCs c/w rhabdo. Pt received 2 L IV fluid bolus in ED    Plan:  -c/w  cc/hr for 10 hours  -f/u repeat CK in AM to monitor for downtrend  -Monitor renal function on BMP    CPK trend 3521-->1652

## 2024-04-26 NOTE — OCCUPATIONAL THERAPY INITIAL EVALUATION ADULT - NSOTDISCHREC_GEN_A_CORE
if home, Home OT with assistance and supervision with ADL/IADLs, RW due to decreased balance impacting MRADLs, shower chair/Acute Inpatient Rehab

## 2024-04-26 NOTE — OCCUPATIONAL THERAPY INITIAL EVALUATION ADULT - BALANCE TRAINING, PT EVAL
GOAL: pt. will increase dynamic sitting balance by 1 grade in order to increase independence with lower body dressing, GOAL: pt. will increase dynamic standing balance by 1 grade in order to perform grooming tasks at sink

## 2024-04-26 NOTE — DISCHARGE NOTE PROVIDER - CARE PROVIDERS DIRECT ADDRESSES
[email or web address]

## 2024-04-26 NOTE — PROGRESS NOTE ADULT - PROBLEM SELECTOR PLAN 3
Pt afebrile on admission, SIRS criteria met with tachycardia, tachypnea. Had AHRF to SpO2 80%, placed on 6L NC. CXR neg for PNA, UA collected after CTX 1 g in ED. CTA Chest neg for PE. CT without focus of infection, possible urinary source. No prior hx of MDR organisms    Plan:  -low suspicion for infection but c/w empiric CTX for short course(4/25-   -f/u blood cultures and urine culture  -f/u RVP  -Monitor wbc and fever curve.

## 2024-04-26 NOTE — OCCUPATIONAL THERAPY INITIAL EVALUATION ADULT - RANGE OF MOTION EXAMINATION, UPPER EXTREMITY
bilateral UE Active ROM was WFL  (within functional limits)
0 = understands/communicates without difficulty

## 2024-04-26 NOTE — DISCHARGE NOTE PROVIDER - NSDCMRMEDTOKEN_GEN_ALL_CORE_FT
finasteride 5 mg oral tablet: 1 tab(s) orally once a day  TLSO Brace: Tlso   acetaminophen 325 mg oral tablet: 2 tab(s) orally every 6 hours As needed Temp greater or equal to 38C (100.4F), Mild Pain (1 - 3)  finasteride 5 mg oral tablet: 1 tab(s) orally once a day  TLSO Brace: Tlso   acetaminophen 325 mg oral tablet: 2 tab(s) orally every 6 hours As needed Temp greater or equal to 38C (100.4F), Mild Pain (1 - 3)  finasteride 5 mg oral tablet: 1 tab(s) orally once a day  lidocaine 4% topical film: 1 patch transdermal once a day  lisinopril 5 mg oral tablet: 1 tab(s) orally once a day  NIFEdipine 30 mg oral tablet, extended release: 1 tab(s) orally once a day  tamsulosin 0.4 mg oral capsule: 1 cap(s) orally once a day (at bedtime)  TLSO Brace: Tlso

## 2024-04-26 NOTE — PROGRESS NOTE ADULT - PROBLEM SELECTOR PLAN 4
Patient presented with AHRF, saturation 80%, improved s/p 6 L O2, now on 2L O2. CXR negative for acute pathology. CTA negative for PE. Likely iso poor respirations due to rib fracture    Plan:  -monitor SpO2  -Wean O2 as tolerated.

## 2024-04-26 NOTE — DISCHARGE NOTE PROVIDER - NSFOLLOWUPCLINICS_GEN_ALL_ED_FT
[facility] - Urology  Urology  [address], 3rd & [address] [place] [address]  Phone: [phone]  Fax:      [facility] - Urology  Urology  [address], 3rd & 4th [place] [address]  Phone: [phone]  Fax:     [facility] Internal Medicine  General Internal Medicine  [address] [place] [id number]  Phone: [phone]  Fax:

## 2024-04-27 LAB
A1C WITH ESTIMATED AVERAGE GLUCOSE RESULT: 5.4 % — SIGNIFICANT CHANGE UP (ref 4–5.6)
ALBUMIN SERPL ELPH-MCNC: 3.6 G/DL — SIGNIFICANT CHANGE UP (ref 3.3–5)
ALP SERPL-CCNC: 75 U/L — SIGNIFICANT CHANGE UP (ref 40–120)
ALT FLD-CCNC: 68 U/L — HIGH (ref 10–45)
ANION GAP SERPL CALC-SCNC: 10 MMOL/L — SIGNIFICANT CHANGE UP (ref 5–17)
AST SERPL-CCNC: 61 U/L — HIGH (ref 10–40)
BASOPHILS # BLD AUTO: 0.06 K/UL — SIGNIFICANT CHANGE UP (ref 0–0.2)
BASOPHILS NFR BLD AUTO: 0.5 % — SIGNIFICANT CHANGE UP (ref 0–2)
BILIRUB SERPL-MCNC: 0.6 MG/DL — SIGNIFICANT CHANGE UP (ref 0.2–1.2)
BUN SERPL-MCNC: 48 MG/DL — HIGH (ref 7–23)
CALCIUM SERPL-MCNC: 9.4 MG/DL — SIGNIFICANT CHANGE UP (ref 8.4–10.5)
CHLORIDE SERPL-SCNC: 107 MMOL/L — SIGNIFICANT CHANGE UP (ref 96–108)
CK SERPL-CCNC: 716 U/L — HIGH (ref 30–200)
CO2 SERPL-SCNC: 25 MMOL/L — SIGNIFICANT CHANGE UP (ref 22–31)
CREAT SERPL-MCNC: 1.2 MG/DL — SIGNIFICANT CHANGE UP (ref 0.5–1.3)
EGFR: 62 ML/MIN/1.73M2 — SIGNIFICANT CHANGE UP
EOSINOPHIL # BLD AUTO: 0.36 K/UL — SIGNIFICANT CHANGE UP (ref 0–0.5)
EOSINOPHIL NFR BLD AUTO: 2.9 % — SIGNIFICANT CHANGE UP (ref 0–6)
ESTIMATED AVERAGE GLUCOSE: 108 MG/DL — SIGNIFICANT CHANGE UP (ref 68–114)
GLUCOSE SERPL-MCNC: 122 MG/DL — HIGH (ref 70–99)
HCT VFR BLD CALC: 42.9 % — SIGNIFICANT CHANGE UP (ref 39–50)
HGB BLD-MCNC: 14 G/DL — SIGNIFICANT CHANGE UP (ref 13–17)
IMM GRANULOCYTES NFR BLD AUTO: 0.9 % — SIGNIFICANT CHANGE UP (ref 0–0.9)
LYMPHOCYTES # BLD AUTO: 1.72 K/UL — SIGNIFICANT CHANGE UP (ref 1–3.3)
LYMPHOCYTES # BLD AUTO: 13.9 % — SIGNIFICANT CHANGE UP (ref 13–44)
MAGNESIUM SERPL-MCNC: 2.2 MG/DL — SIGNIFICANT CHANGE UP (ref 1.6–2.6)
MAGNESIUM SERPL-MCNC: 2.4 MG/DL — SIGNIFICANT CHANGE UP (ref 1.6–2.6)
MCHC RBC-ENTMCNC: 32.6 GM/DL — SIGNIFICANT CHANGE UP (ref 32–36)
MCHC RBC-ENTMCNC: 33.2 PG — SIGNIFICANT CHANGE UP (ref 27–34)
MCV RBC AUTO: 101.7 FL — HIGH (ref 80–100)
MONOCYTES # BLD AUTO: 1.72 K/UL — HIGH (ref 0–0.9)
MONOCYTES NFR BLD AUTO: 13.9 % — SIGNIFICANT CHANGE UP (ref 2–14)
NEUTROPHILS # BLD AUTO: 8.42 K/UL — HIGH (ref 1.8–7.4)
NEUTROPHILS NFR BLD AUTO: 67.9 % — SIGNIFICANT CHANGE UP (ref 43–77)
NRBC # BLD: 0 /100 WBCS — SIGNIFICANT CHANGE UP (ref 0–0)
PHOSPHATE SERPL-MCNC: 2.7 MG/DL — SIGNIFICANT CHANGE UP (ref 2.5–4.5)
PHOSPHATE SERPL-MCNC: 2.8 MG/DL — SIGNIFICANT CHANGE UP (ref 2.5–4.5)
PLATELET # BLD AUTO: 225 K/UL — SIGNIFICANT CHANGE UP (ref 150–400)
POTASSIUM SERPL-MCNC: 3.8 MMOL/L — SIGNIFICANT CHANGE UP (ref 3.5–5.3)
POTASSIUM SERPL-SCNC: 3.8 MMOL/L — SIGNIFICANT CHANGE UP (ref 3.5–5.3)
PROT SERPL-MCNC: 6.5 G/DL — SIGNIFICANT CHANGE UP (ref 6–8.3)
PSA FLD-MCNC: 105 NG/ML — HIGH (ref 0–4)
RBC # BLD: 4.22 M/UL — SIGNIFICANT CHANGE UP (ref 4.2–5.8)
RBC # FLD: 13.2 % — SIGNIFICANT CHANGE UP (ref 10.3–14.5)
SODIUM SERPL-SCNC: 142 MMOL/L — SIGNIFICANT CHANGE UP (ref 135–145)
WBC # BLD: 12.39 K/UL — HIGH (ref 3.8–10.5)
WBC # FLD AUTO: 12.39 K/UL — HIGH (ref 3.8–10.5)

## 2024-04-27 PROCEDURE — 99232 SBSQ HOSP IP/OBS MODERATE 35: CPT

## 2024-04-27 PROCEDURE — 72100 X-RAY EXAM L-S SPINE 2/3 VWS: CPT | Mod: 26

## 2024-04-27 RX ORDER — POLYETHYLENE GLYCOL 3350 17 G/17G
17 POWDER, FOR SOLUTION ORAL ONCE
Refills: 0 | Status: COMPLETED | OUTPATIENT
Start: 2024-04-27 | End: 2024-04-27

## 2024-04-27 RX ORDER — SENNA PLUS 8.6 MG/1
2 TABLET ORAL ONCE
Refills: 0 | Status: COMPLETED | OUTPATIENT
Start: 2024-04-27 | End: 2024-04-27

## 2024-04-27 RX ADMIN — OXYCODONE HYDROCHLORIDE 2.5 MILLIGRAM(S): 5 TABLET ORAL at 06:00

## 2024-04-27 RX ADMIN — OXYCODONE HYDROCHLORIDE 2.5 MILLIGRAM(S): 5 TABLET ORAL at 11:27

## 2024-04-27 RX ADMIN — SENNA PLUS 2 TABLET(S): 8.6 TABLET ORAL at 17:40

## 2024-04-27 RX ADMIN — FINASTERIDE 5 MILLIGRAM(S): 5 TABLET, FILM COATED ORAL at 11:27

## 2024-04-27 RX ADMIN — HEPARIN SODIUM 5000 UNIT(S): 5000 INJECTION INTRAVENOUS; SUBCUTANEOUS at 21:12

## 2024-04-27 RX ADMIN — OXYCODONE HYDROCHLORIDE 2.5 MILLIGRAM(S): 5 TABLET ORAL at 12:19

## 2024-04-27 RX ADMIN — OXYCODONE HYDROCHLORIDE 2.5 MILLIGRAM(S): 5 TABLET ORAL at 05:00

## 2024-04-27 RX ADMIN — POLYETHYLENE GLYCOL 3350 17 GRAM(S): 17 POWDER, FOR SOLUTION ORAL at 17:42

## 2024-04-27 RX ADMIN — HEPARIN SODIUM 5000 UNIT(S): 5000 INJECTION INTRAVENOUS; SUBCUTANEOUS at 04:57

## 2024-04-27 RX ADMIN — HEPARIN SODIUM 5000 UNIT(S): 5000 INJECTION INTRAVENOUS; SUBCUTANEOUS at 14:13

## 2024-04-27 NOTE — PHYSICAL THERAPY INITIAL EVALUATION ADULT - GENERAL OBSERVATIONS, REHAB EVAL
Pt received semi-supine in bed, A&Ox4, +2L o2 via NC, +tele, +cont pulse ox, +emotionally labile at times during session, consolement and education provided t/o, hugo 45 min PT eval.

## 2024-04-27 NOTE — DIETITIAN INITIAL EVALUATION ADULT - PERTINENT MEDS FT
MEDICATIONS  (STANDING):  cefTRIAXone   IVPB 1000 milliGRAM(s) IV Intermittent every 24 hours  finasteride 5 milliGRAM(s) Oral daily  heparin   Injectable 5000 Unit(s) SubCutaneous every 8 hours  sodium chloride 0.9%. 1000 milliLiter(s) (100 mL/Hr) IV Continuous <Continuous>    MEDICATIONS  (PRN):  acetaminophen     Tablet .. 650 milliGRAM(s) Oral every 6 hours PRN Temp greater or equal to 38C (100.4F), Mild Pain (1 - 3)  oxyCODONE    IR 2.5 milliGRAM(s) Oral every 6 hours PRN Moderate Pain (4 - 6)

## 2024-04-27 NOTE — DIETITIAN INITIAL EVALUATION ADULT - REASON INDICATOR FOR ASSESSMENT
Consulted for MST score >/2. Information obtained from pt, RN, electronic medical record. Chart reviewed, events noted.

## 2024-04-27 NOTE — PROGRESS NOTE ADULT - PROBLEM SELECTOR PLAN 7
Patient with fall at home, found down by neighbor for 2 days, reports was too weak to get up but denies LOC or head strike. CT Head: No acute intracranial hemorrhage  CTA Chest : Mildly displaced right eighth anterolateral right rib fracture. Marked height loss of the L1 vertebral body of uncertain chronicity CT cervical/thoracic/lumbar spine w IV: No acute fracture cervical spine. No acute fracture thoracic and lumbar spine. If pain persists, follow-up MRI exam recommended (if no contraindication).    Plan:  -f/u screening ECG and TTE, monitor on tele  -Fall precautions  -PT/OT consult + PMR

## 2024-04-27 NOTE — DIETITIAN INITIAL EVALUATION ADULT - NSFNSPHYEXAMSKINFT_GEN_A_CORE
no pressure injury but seen by wound care on 4/26:  "Left chest wall contusion  Left face abrasion  Bilateral knees abrasion  Incontinence of bowel and bladder  Incontinence Dermatitis  Bilateral groin/scrotum/penis moisture dermatitis  Pressure injury Prophylaxis"

## 2024-04-27 NOTE — PROGRESS NOTE ADULT - PROBLEM SELECTOR PLAN 6
CT noting L1 vertebra loss of height, fracture of unclear chronicity. Neurosurg evaluated noting L1 schmorl’s node w/ very-very mild A3 burst fx minimally displaced, TLICS 2. CT C spine with sclerotic lesion to C3 pedicle SINS 6.    Plan:  -neurosurgery consulted, recs appreciated  -pain management w tylenol q6 prn for mild, oxy 2.5 mg q6 prn for moderate pain  -no acute neurosurgery intervention  -TLSO brace when OOB  -AP/Lat/Flex/Ex lumbar X-rays.  -output f/u w/Dr. Rodrigues in 4-6W   -defer to heme/onc regarding possible w/u of C3 lesion given low SINS and lack of symptoms.

## 2024-04-27 NOTE — DIETITIAN INITIAL EVALUATION ADULT - PHYSCIAL ASSESSMENT
Drug Dosing Weight  Height (cm): 180.3 (25 Apr 2024 17:08)  Weight (kg): 68 (25 Apr 2024 17:08)- stated wt   BMI (kg/m2): 20.9 (25 Apr 2024 17:08)    Daily wt (bed scale Weight in kG): 82.6 (04-26 @ 00:30)    UBW: ~180-185lb per pt, states he might be losing some wt as he fell and was not able to get up for 2/3 days before admission   Wt history from previous RD notes: no history   Wt history per [facility] HIE: 81.6kg (8/9/22), 79.8kg (8/25/18), 78.7kg (4/6/16)      **Question about accuracy of dosing wt. RD will continue to monitor wt trends as available/able.     IBW: 172lb, 105% IBW

## 2024-04-27 NOTE — PROGRESS NOTE ADULT - PROBLEM SELECTOR PLAN 1
CK 3521 iso being down for 2 days at home and inability to get up. UA with moderate blood, 2 RBCs c/w rhabdo. Pt received 2 L IV fluid bolus in ED    Plan:  -c/w  cc/hr   -Monitor renal function on BMP    CPK trend 3521-->1652--> 716

## 2024-04-27 NOTE — DIETITIAN INITIAL EVALUATION ADULT - ADD RECOMMEND
-- Monitor PO intake, GI tolerance, skin integrity, labs, weight, and bowel movement regularity.   -- Will continue to honor food and beverage preferences to maximize level of nutrient intake.  -- Assist with meals PRN and encourage PO intake.

## 2024-04-27 NOTE — DIETITIAN INITIAL EVALUATION ADULT - PROBLEM SELECTOR PLAN 7
Patient with fall at home, found down by neighbor for 2 days, reports was too weak to get up but denies LOC or head strike. CT Head: No acute intracranial hemorrhage  CTA Chest : Mildly displaced right eighth anterolateral right rib fracture. Marked height loss of the L1 vertebral body of uncertain chronicity CT cervical/thoracic/lumbar spine w IV: No acute fracture cervical spine. No acute fracture thoracic and lumbar spine. If pain persists, follow-up MRI exam recommended (if no contraindication).    Plan:  -f/u screening ECG and TTE, monitor on tele  -Fall precautions  -PT/OT consult

## 2024-04-27 NOTE — DIETITIAN INITIAL EVALUATION ADULT - PROBLEM SELECTOR PLAN 8
Pt with urinary retention in ED, leger placed    Plan:  -c/w finasteride  - Monitor i's and o's  - trial of void as able

## 2024-04-27 NOTE — DIETITIAN INITIAL EVALUATION ADULT - PROBLEM SELECTOR PLAN 3
Pt afebrile on admission, SIRS criteria met with tachycardia, tachypnea. Had AHRF to SpO2 80%, placed on 6L NC. CXR neg for PNA, UA collected after CTX 1 g in ED. CTA Chest neg for PE. CT without focus of infection, possible urinary source. No prior hx of MDR organisms    Plan:  -low suspicion for infection but c/w empiric CTX for short course(4/25-   -f/u blood cultures and urine culture  -f/u RVP  -Monitor wbc and fever curve

## 2024-04-27 NOTE — PROGRESS NOTE ADULT - PROBLEM SELECTOR PLAN 8
Pt with urinary retention in ED, leger placed.    Plan:  -c/w finasteride  - Monitor i's and o's  Trial of Void today(4/27)

## 2024-04-27 NOTE — PHYSICAL THERAPY INITIAL EVALUATION ADULT - ADDITIONAL COMMENTS
Prior to admission pt reports being independent of all ADL's & functional mobility without AD. Pt resides in apt, alone, 20 steps to enter. Prior to admission pt reports being independent of all ADL's & functional mobility without AD. Pt resides in apt, alone, 2 steps to enter, 15 steps to living area. +tub. +driving. Not working.

## 2024-04-27 NOTE — DIETITIAN INITIAL EVALUATION ADULT - PROBLEM SELECTOR PLAN 4
Patient presented with AHRF, saturation 80%, improved s/p 6 L O2, now on 2L O2. CXR negative for acute pathology. CTA negative for PE. Likely iso poor respirations due to rib fracture    Plan:  -monitor SpO2  -Wean O2 as tolerated

## 2024-04-27 NOTE — DIETITIAN INITIAL EVALUATION ADULT - PROBLEM SELECTOR PLAN 6
CT noting L1 vertebra loss of height, fracture of unclear chronicity. Neurosurg evaluated noting L1 schmorl’s node w/ very-very mild A3 burst fx minimally displaced, TLICS 2. CT C spine with sclerotic lesion to C3 pedicle SINS 6.    Plan:  -neurosurgery consulted, recs appreciated  -pain management w tylenol q6 prn for mild, oxy 2.5 mg q6 prn for moderate pain  -no acute neurosurgery intervention  -TLSO brace when OOB  -AP/Lat/Flex/Ex lumbar X-rays  -output f/u w/Dr. Rodrigues in 4-6W   -defer to heme/onc regarding possible w/u of C3 lesion given low SINS and lack of symptoms

## 2024-04-27 NOTE — DIETITIAN INITIAL EVALUATION ADULT - PERTINENT LABORATORY DATA
04-27    142  |  107  |  48<H>  ----------------------------<  122<H>  3.8   |  25  |  1.20    Ca    9.4      27 Apr 2024 05:48  Phos  2.7     04-27  Mg     2.4     04-27    TPro  6.5  /  Alb  3.6  /  TBili  0.6  /  DBili  x   /  AST  61<H>  /  ALT  68<H>  /  AlkPhos  75  04-27

## 2024-04-27 NOTE — DIETITIAN INITIAL EVALUATION ADULT - PROBLEM SELECTOR PLAN 1
CK 3521 iso being down for 2 days at home and inability to get up. UA with moderate blood, 2 RBCs c/w rhabdo. Pt received 2 L IV fluid bolus in ED    Plan:  -c/w  cc/hr for 10 hours  -f/u repeat CK in AM to monitor for downtrend  -Monitor renal function on BMP

## 2024-04-27 NOTE — PHYSICAL THERAPY INITIAL EVALUATION ADULT - PERTINENT HX OF CURRENT PROBLEM, REHAB EVAL
78-year-old male with a history of HTN, bipolar disorder, depression, personality disorder with past ECT treatment, prostate ca s/p TURP presents after a fall that occurred 2 days ago while cleaning. Patient denies head strike or LOC but was unable to get up and was found on the ground by his neighbor after he was heard screaming. The neighbor activated EMS and pt was found to be hypoxemic to 80%, started on oxygen.P atient reports he lives alone, does not have a partner or living siblings. found to have rhabdo c/b MEREDITH, rib fx c/f AHRF, and vertebral fx admitted to medicine for further management. TLSO brace when OOB.   -X-ray pelvis: No fracture. No dislocation.  -Xray- chest No evidence of acute pulmonary disease or trauma.  -CT lumbar spine No acute intracranial hemorrhage. No acute fracture cervical spine. If pain persists, follow-up MRI exam recommended (if no contraindication).No acute fracture thoracic and lumbar spine. If pain persists, follow-up MRI exam recommended (if no contraindication).  -CT Chest: Mildly displaced right eighth anterolateral right rib fracture. Marked   height loss of the L1 vertebral body No pulmonary embolism. 78-year-old male with a history of HTN, bipolar disorder, depression, personality disorder with past ECT treatment, prostate ca s/p TURP presents after a fall that occurred 2 days ago while cleaning. Patient denies head strike or LOC but was unable to get up and was found on the ground by his neighbor after he was heard screaming. The neighbor activated EMS and pt was found to be hypoxemic to 80%, started on oxygen. Patient reports he lives alone, does not have a partner or living siblings. found to have rhabdo c/b MEREDITH, rib fx c/f AHRF, and vertebral fx admitted to medicine for further management. TLSO brace when OOB. Per neurrosx- CT L spine w/ moderate degenerative changes, L1 schmorl’s node w/ very-very mild A3 burst fx minimally displaced, TLICS 2. CT C spine with sclerotic lesion to C3 pedicle SINS 6. Also has rib fx, wbc 22, metabolic acidosis   -X-ray pelvis: No fracture. No dislocation.  -Xray- chest No evidence of acute pulmonary disease or trauma.  -CT lumbar spine No acute intracranial hemorrhage. No acute fracture cervical spine. If pain persists, follow-up MRI exam recommended (if no contraindication).No acute fracture thoracic and lumbar spine. If pain persists, follow-up MRI exam recommended (if no contraindication).  -CT Chest: Mildly displaced right eighth anterolateral right rib fracture. Marked   height loss of the L1 vertebral body No pulmonary embolism.

## 2024-04-27 NOTE — DIETITIAN INITIAL EVALUATION ADULT - REASON
Pt appears well nourished, no overt signs of muscle and/or fat depletion noted upon visual assessment.

## 2024-04-27 NOTE — DIETITIAN INITIAL EVALUATION ADULT - OTHER INFO
-- MEREDITH due to rhabdomyolysis, s/p IVF Lactated Ringers bolus upon admission, on IVF NaCl 0.9% @ 100ml/hr, lab improving.   -- Fall with fractures

## 2024-04-27 NOTE — DIETITIAN INITIAL EVALUATION ADULT - PROBLEM SELECTOR PLAN 2
BUN/Cr elevated to 73/1.52 on admission, likely prerenal iso of dehydration and rhabdo from being down for 2 days plus obstruction, likely BPH. Baseline Cr in 2022 was 0.97. CT A/P noting  no hydronephrosis, nonobstructive 0.3, 0.3, and 0.5 cm left renal calculi and nonobstructive 0.3 right renal calculi. Hyperdense L renal cyst possibly hemorrhagic cyst but indeterminant. s/p 2 L IV fluids in ED    Plan:  - monitor for improvement in SCr on BMP s/p IV fluids  - monitor i's and o's  -renally dose meds  -monitor bladder scans to r/o obstruction  -f/u renal US to better eval renal cyst  -avoid nephrotoxic agents

## 2024-04-27 NOTE — DIETITIAN INITIAL EVALUATION ADULT - ENERGY INTAKE
Adequate (%) Pt reports good appetite/PO intake since admission, flowsheets document PO intake %, on Regular diet. Pt made aware of menu ordering procedures in house with menu provided, encourage pt to order as needed to encourage PO intake while in house. Food preferences obtained, will honor as able.

## 2024-04-27 NOTE — DIETITIAN INITIAL EVALUATION ADULT - ORAL INTAKE PTA/DIET HISTORY
Pt was eating well with no changes in appetite. Pt was not following therapeutic diet; eating home-made foods or take out foods sometimes. Confirms no known food allergies. Denies Hx of chewing or swallowing issues. Denies GI distress. Denies nutrition supplement use, on MVI per pt.

## 2024-04-28 LAB
ALBUMIN SERPL ELPH-MCNC: 3.5 G/DL — SIGNIFICANT CHANGE UP (ref 3.3–5)
ALP SERPL-CCNC: 72 U/L — SIGNIFICANT CHANGE UP (ref 40–120)
ALT FLD-CCNC: 70 U/L — HIGH (ref 10–45)
ANION GAP SERPL CALC-SCNC: 9 MMOL/L — SIGNIFICANT CHANGE UP (ref 5–17)
AST SERPL-CCNC: 52 U/L — HIGH (ref 10–40)
BILIRUB SERPL-MCNC: 0.6 MG/DL — SIGNIFICANT CHANGE UP (ref 0.2–1.2)
BUN SERPL-MCNC: 33 MG/DL — HIGH (ref 7–23)
CALCIUM SERPL-MCNC: 9.4 MG/DL — SIGNIFICANT CHANGE UP (ref 8.4–10.5)
CHLORIDE SERPL-SCNC: 108 MMOL/L — SIGNIFICANT CHANGE UP (ref 96–108)
CK SERPL-CCNC: 501 U/L — HIGH (ref 30–200)
CO2 SERPL-SCNC: 25 MMOL/L — SIGNIFICANT CHANGE UP (ref 22–31)
CREAT SERPL-MCNC: 0.93 MG/DL — SIGNIFICANT CHANGE UP (ref 0.5–1.3)
EGFR: 85 ML/MIN/1.73M2 — SIGNIFICANT CHANGE UP
GLUCOSE SERPL-MCNC: 118 MG/DL — HIGH (ref 70–99)
HCT VFR BLD CALC: 40.9 % — SIGNIFICANT CHANGE UP (ref 39–50)
HGB BLD-MCNC: 13.6 G/DL — SIGNIFICANT CHANGE UP (ref 13–17)
MCHC RBC-ENTMCNC: 33.3 GM/DL — SIGNIFICANT CHANGE UP (ref 32–36)
MCHC RBC-ENTMCNC: 33.8 PG — SIGNIFICANT CHANGE UP (ref 27–34)
MCV RBC AUTO: 101.7 FL — HIGH (ref 80–100)
NRBC # BLD: 0 /100 WBCS — SIGNIFICANT CHANGE UP (ref 0–0)
PLATELET # BLD AUTO: 201 K/UL — SIGNIFICANT CHANGE UP (ref 150–400)
POTASSIUM SERPL-MCNC: 3.6 MMOL/L — SIGNIFICANT CHANGE UP (ref 3.5–5.3)
POTASSIUM SERPL-SCNC: 3.6 MMOL/L — SIGNIFICANT CHANGE UP (ref 3.5–5.3)
PROT SERPL-MCNC: 6.5 G/DL — SIGNIFICANT CHANGE UP (ref 6–8.3)
RBC # BLD: 4.02 M/UL — LOW (ref 4.2–5.8)
RBC # FLD: 13.1 % — SIGNIFICANT CHANGE UP (ref 10.3–14.5)
SODIUM SERPL-SCNC: 142 MMOL/L — SIGNIFICANT CHANGE UP (ref 135–145)
TROPONIN T, HIGH SENSITIVITY RESULT: 23 NG/L — SIGNIFICANT CHANGE UP (ref 0–51)
WBC # BLD: 10.7 K/UL — HIGH (ref 3.8–10.5)
WBC # FLD AUTO: 10.7 K/UL — HIGH (ref 3.8–10.5)

## 2024-04-28 PROCEDURE — 99232 SBSQ HOSP IP/OBS MODERATE 35: CPT

## 2024-04-28 RX ADMIN — FINASTERIDE 5 MILLIGRAM(S): 5 TABLET, FILM COATED ORAL at 12:41

## 2024-04-28 RX ADMIN — HEPARIN SODIUM 5000 UNIT(S): 5000 INJECTION INTRAVENOUS; SUBCUTANEOUS at 21:11

## 2024-04-28 RX ADMIN — HEPARIN SODIUM 5000 UNIT(S): 5000 INJECTION INTRAVENOUS; SUBCUTANEOUS at 12:41

## 2024-04-28 RX ADMIN — OXYCODONE HYDROCHLORIDE 2.5 MILLIGRAM(S): 5 TABLET ORAL at 10:07

## 2024-04-28 RX ADMIN — Medication 650 MILLIGRAM(S): at 03:30

## 2024-04-28 RX ADMIN — Medication 650 MILLIGRAM(S): at 03:20

## 2024-04-28 RX ADMIN — OXYCODONE HYDROCHLORIDE 2.5 MILLIGRAM(S): 5 TABLET ORAL at 10:38

## 2024-04-28 RX ADMIN — HEPARIN SODIUM 5000 UNIT(S): 5000 INJECTION INTRAVENOUS; SUBCUTANEOUS at 04:52

## 2024-04-28 NOTE — PROGRESS NOTE ADULT - PROBLEM SELECTOR PLAN 3
Pt afebrile on admission, SIRS criteria met with tachycardia, tachypnea. Had AHRF to SpO2 80%, placed on 6L NC. CXR neg for PNA, UA collected after CTX 1 g in ED. CTA Chest neg for PE. CT without focus of infection, possible urinary source. No prior hx of MDR organisms    Plan:  s/p empiric Ceftriaxone   Blood and urine cultures finalized- negative   RVP negative  -Monitor wbc and fever curve

## 2024-04-28 NOTE — PROGRESS NOTE ADULT - PROBLEM SELECTOR PLAN 6
CT noting L1 vertebra loss of height, fracture of unclear chronicity. Neurosurg evaluated noting L1 schmorl’s node w/ very-very mild A3 burst fx minimally displaced, TLICS 2. CT C spine with sclerotic lesion to C3 pedicle SINS 6.    Plan:  -neurosurgery consulted, recs appreciated  -pain management w tylenol q6 prn for mild, oxy 2.5 mg q6 prn for moderate pain  -no acute neurosurgery intervention  -TLSO brace when OOB  -AP/Lat/Flex/Ex lumbar X-rays.  -output f/u w/Dr. Rodrigues in 4-6W   -defer to heme/onc regarding possible w/u of C3 lesion given low SINS and lack of symptoms

## 2024-04-28 NOTE — PROGRESS NOTE ADULT - PROBLEM SELECTOR PLAN 7
Patient with fall at home, found down by neighbor for 2 days, reports was too weak to get up but denies LOC or head strike. CT Head: No acute intracranial hemorrhage  CTA Chest : Mildly displaced right eighth anterolateral right rib fracture. Marked height loss of the L1 vertebral body of uncertain chronicity CT cervical/thoracic/lumbar spine w IV: No acute fracture cervical spine. No acute fracture thoracic and lumbar spine. If pain persists, follow-up MRI exam recommended (if no contraindication).    Plan:  -f/u screening ECG and TTE, monitor on tele  -Fall precautions  -PT/OT consulted want inpatient rehab   PMR pending

## 2024-04-28 NOTE — PROGRESS NOTE ADULT - PROBLEM SELECTOR PLAN 1
CK 3521 iso being down for 2 days at home and inability to get up. UA with moderate blood, 2 RBCs c/w rhabdo. Pt received 2 L IV fluid bolus in ED    Plan:  -Monitor renal function on BMP  RESOLVING    CPK trend 3521-->1652--> 716--> 501

## 2024-04-28 NOTE — PROGRESS NOTE ADULT - PROBLEM SELECTOR PLAN 8
Pt with urinary retention in ED, leger placed.    Plan:  -c/w finasteride  - Monitor i's and o's  Trial of Void done yesterday (4/27)

## 2024-04-28 NOTE — PROGRESS NOTE ADULT - PROBLEM SELECTOR PLAN 2
BUN/Cr elevated to 73/1.52 on admission, likely prerenal iso of dehydration and rhabdo from being down for 2 days plus obstruction, likely BPH. Baseline Cr in 2022 was 0.97. CT A/P noting  no hydronephrosis, nonobstructive 0.3, 0.3, and 0.5 cm left renal calculi and nonobstructive 0.3 right renal calculi. Hyperdense L renal cyst possibly hemorrhagic cyst but indeterminant. s/p 2 L IV fluids in ED    Plan:  - monitor for improvement in SCr on BMP s/p IV fluids  - monitor i's and o's  -renally dose meds  -monitor bladder scans to r/o obstruction.  -Followup renal US to better eval renal cyst  -avoid nephrotoxic agents

## 2024-04-29 PROCEDURE — 99222 1ST HOSP IP/OBS MODERATE 55: CPT

## 2024-04-29 PROCEDURE — 99232 SBSQ HOSP IP/OBS MODERATE 35: CPT

## 2024-04-29 RX ORDER — LANOLIN ALCOHOL/MO/W.PET/CERES
3 CREAM (GRAM) TOPICAL ONCE
Refills: 0 | Status: COMPLETED | OUTPATIENT
Start: 2024-04-29 | End: 2024-04-29

## 2024-04-29 RX ADMIN — HEPARIN SODIUM 5000 UNIT(S): 5000 INJECTION INTRAVENOUS; SUBCUTANEOUS at 21:48

## 2024-04-29 RX ADMIN — OXYCODONE HYDROCHLORIDE 2.5 MILLIGRAM(S): 5 TABLET ORAL at 13:30

## 2024-04-29 RX ADMIN — OXYCODONE HYDROCHLORIDE 2.5 MILLIGRAM(S): 5 TABLET ORAL at 12:24

## 2024-04-29 RX ADMIN — FINASTERIDE 5 MILLIGRAM(S): 5 TABLET, FILM COATED ORAL at 12:18

## 2024-04-29 RX ADMIN — Medication 3 MILLIGRAM(S): at 23:21

## 2024-04-29 RX ADMIN — HEPARIN SODIUM 5000 UNIT(S): 5000 INJECTION INTRAVENOUS; SUBCUTANEOUS at 12:18

## 2024-04-29 RX ADMIN — HEPARIN SODIUM 5000 UNIT(S): 5000 INJECTION INTRAVENOUS; SUBCUTANEOUS at 05:04

## 2024-04-29 NOTE — CONSULT NOTE ADULT - SUBJECTIVE AND OBJECTIVE BOX
HPI:  78-year-old male with a history of HTN, bipolar disorder, depression, personality disorder with past ECT treatment, prostate ca s/p TURP presents after a fall that occurred 2 days ago while cleaning. Patient denies head strike or LOC but was unable to get up and was found on the ground by his neighbor after he was heard screaming. The neighbor activated EMS and pt was found to be hypoxemic to 80%, started on oxygen.  Patient denies any history of smoking, alcohol use, alcohol withdrawal, recreational or illicit drug use, fevers, chills, chest pain, nausea, vomiting, dysuria, diarrhea. Does not take any medications at home and does not have a PCP, has not seen a doctor in several years. Patient reports he lives alone, does not have a partner or living siblings.     On arrival T 97.1, HR , -173/, RR 20-24, Sat 80%, placed on 6 LNC, now on 2L.   Labs notable for WBC 22.91, neutrophilic predominance 81.8%, Hb 17.8, plt 344. Na 146, BUN/Cr 73/1.52, Ca 10.8. , AST//47, CK 3521. Blood alcohol content <10. UA w 15 ketones, neg nitrites, neg leuk esterase, 0 wbc, neg bacteria, moderate blood, 2 RBCs    CXR: No evidence of acute pulmonary disease or trauma. Xray pelvis: No fracture. No dislocation.  CT Head: No acute intracranial hemorrhage  CTA Chest : Mildly displaced right eighth anterolateral right rib fracture. Marked height loss of the L1 vertebral body of uncertain chronicity but new since the previous exam of 9/25/2014. Correlate clinically for pain. No pulmonary embolism. Hyperdense left renal cyst possibly hemorrhagic cyst but remains indeterminant. Recommend nonemergent renal ultrasound. CT cervical/thoracic/lumbar spine w IV: No acute fracture cervical spine. No acute fracture thoracic and lumbar spine. If pain persists, follow-up MRI exam recommended (if no contraindication).    s/p Ceftriaxone 1 g, 2 L LR bolus, 4 mg IV morphine in the ED   (25 Apr 2024 23:27)    Patient was admitted on 4/25, seen today, has pain with cough/sneeze. Denies other falls.     REVIEW OF SYSTEMS  Denies chest pain, SOB, N/V, F/C, abdominal pain     VITALS  T(C): 36.4 (04-29-24 @ 04:20), Max: 36.6 (04-28-24 @ 11:34)  HR: 70 (04-29-24 @ 04:20) (70 - 93)  BP: 151/81 (04-29-24 @ 04:20) (133/91 - 158/72)  RR: 18 (04-29-24 @ 04:20) (18 - 18)  SpO2: 94% (04-29-24 @ 04:20) (94% - 97%)  Wt(kg): --    PAST MEDICAL & SURGICAL HISTORY  Depression    Insomnia    Environmental Allergies    BPH (Benign Prostatic Hypertrophy)    HTN (hypertension)    S/P TURP (Transurethral Resection of Prostate)        FUNCTIONAL HISTORY  Lives alone, 15 steps   Independent AMB and ADLs PTA     CURRENT FUNCTIONAL STATUS  PT  4/27  bed mobility min assist   transfers min assist with RW  gait min assist with RW x 25 feet   standing balance fair minus     OT 4/26  bed mobility min to mod assist   transfers min assist with RW    RECENT LABS/IMAGING  CBC Full  -  ( 28 Apr 2024 05:45 )  WBC Count : 10.70 K/uL  RBC Count : 4.02 M/uL  Hemoglobin : 13.6 g/dL  Hematocrit : 40.9 %  Platelet Count - Automated : 201 K/uL  Mean Cell Volume : 101.7 fl  Mean Cell Hemoglobin : 33.8 pg  Mean Cell Hemoglobin Concentration : 33.3 gm/dL  Auto Neutrophil # : x  Auto Lymphocyte # : x  Auto Monocyte # : x  Auto Eosinophil # : x  Auto Basophil # : x  Auto Neutrophil % : x  Auto Lymphocyte % : x  Auto Monocyte % : x  Auto Eosinophil % : x  Auto Basophil % : x    04-28    142  |  108  |  33<H>  ----------------------------<  118<H>  3.6   |  25  |  0.93    Ca    9.4      28 Apr 2024 05:45    TPro  6.5  /  Alb  3.5  /  TBili  0.6  /  DBili  x   /  AST  52<H>  /  ALT  70<H>  /  AlkPhos  72  04-28    Urinalysis Basic - ( 28 Apr 2024 05:45 )    Color: x / Appearance: x / SG: x / pH: x  Gluc: 118 mg/dL / Ketone: x  / Bili: x / Urobili: x   Blood: x / Protein: x / Nitrite: x   Leuk Esterase: x / RBC: x / WBC x   Sq Epi: x / Non Sq Epi: x / Bacteria: x    < from: CT Thoracic Spine Reform w/ IV Cont (04.25.24 @ 20:33) >      IMPRESSION:  No acute intracranial hemorrhage    No acute fracture cervical spine. If pain persists, follow-up MRI exam   recommended (if no contraindication).    No acute fracture thoracic and lumbar spine. If pain persists, follow-up   MRI exam recommended (if no contraindication).      < end of copied text >    < from: Xray Lumbar Spine AP + Lateral (04.27.24 @ 11:11) >    IMPRESSION:  Minimal excursion of the upper lumbar/lower thoracic spine in flexion and   extension. Mid/lower lumbar spine grossly unchanged      < end of copied text >      ALLERGIES  No Known Drug Allergies  POLLEN AND GRASS (Unknown)      MEDICATIONS   acetaminophen     Tablet .. 650 milliGRAM(s) Oral every 6 hours PRN  finasteride 5 milliGRAM(s) Oral daily  heparin   Injectable 5000 Unit(s) SubCutaneous every 8 hours  oxyCODONE    IR 2.5 milliGRAM(s) Oral every 6 hours PRN  sodium chloride 0.9%. 1000 milliLiter(s) IV Continuous <Continuous>      ----------------------------------------------------------------------------------------  PHYSICAL EXAM  Constitutional - NAD, Comfortable, in chair   Chest - Breathing comfortably  Cardiovascular - S1S2   Abdomen - Soft   Extremities - No C/C/E, No calf tenderness   Neurologic Exam -   follows commands                 Cognitive - Awake, Alert, AAO to self, place, date, year, situation     Communication - Fluent, No dysarthria        Motor - No focal deficits                    LEFT    UE - ShAB 5/5, EF 5/5, EE 5/5, WE 5/5,  5/5                    RIGHT UE - ShAB 5/5, EF 5/5, EE 5/5, WE 5/5,  5/5                    LEFT    LE - HF 5/5, KE 5/5, DF 5/5, PF 5/5                    RIGHT LE - HF 5/5, KE 5/5, DF 5/5, PF 5/5        Sensory - Intact to LT     Psychiatric - Mood stable, Affect WNL  ----------------------------------------------------------------------------------------  ASSESSMENT/PLAN  78yMale h/o HTN, BPD, Prostate cancer with functional deficits after fall, sepsis, Acute hypoxic resp failure  TLSO brace when OOB   +rib fracture, rhabdomyolysis, ARF improved   Pain - Tylenol oxycodone   DVT PPX - SCDs heparin   Rehab - Will continue to follow for ongoing rehab needs and recommendations.    continue bedside therapy, PT/OT    Recommend ACUTE inpatient rehabilitation for the functional deficits consisting of 3 hours of therapy/day  x one week, 24 hour RN/daily PMR physician for comorbid medical management. Patient will be able to tolerate 3 hours a day.   
p ([id number])     HPI: Jumana Radford  78M Hx BPD/depression s/p ECT/htn/ProstateCa s/p TURP p/f mech fall unable to get up last 2d. CTH wnl. CT L spine w/ moderate degenerative changes, L1 schmorl’s node w/ very-very mild A3 burst fx minimally displaced, TLICS 2. CT C spine with sclerotic lesion to C3 pedicle SINS 6. Also has rib fx, wbc 22, metabolic acidosis   Exam: neurointact outside of L delt 4/5 pain limited from rib fx    =====================  PAST MEDICAL HISTORY   Depression    Insomnia    Environmental Allergies    BPH (Benign Prostatic Hypertrophy)    HTN (hypertension)      PAST SURGICAL HISTORY   S/P TURP (Transurethral Resection of Prostate)      No Known Drug Allergies  POLLEN AND GRASS (Unknown)      MEDICATIONS:  Antibiotics:    Neuro:    Other:      SOCIAL HISTORY:   Occupation:   Marital Status:     FAMILY HISTORY:  No pertinent family history in first degree relatives        ROS: Negative except per HPI    LABS:  PT/INR - ( 25 Apr 2024 17:24 )   PT: 13.0 sec;   INR: 1.19 ratio         PTT - ( 25 Apr 2024 17:24 )  PTT:32.8 sec                        17.8   22.91 )-----------( 344      ( 25 Apr 2024 17:24 )             52.3     04-25    146<H>  |  105  |  73<H>  ----------------------------<  141<H>  4.8   |  19<L>  |  1.52<H>    Ca    10.8<H>      25 Apr 2024 17:24    TPro  8.3  /  Alb  4.6  /  TBili  1.0  /  DBili  x   /  AST  174<H>  /  ALT  108<H>  /  AlkPhos  110  04-25      
Wound Surgery Consult Note:    HPI:  78-year-old male with a history of HTN, bipolar disorder, depression, personality disorder with past ECT treatment, prostate ca s/p TURP presents after a fall that occurred 2 days ago while cleaning. Patient denies head strike or LOC but was unable to get up and was found on the ground by his neighbor after he was heard screaming. The neighbor activated EMS and pt was found to be hypoxemic to 80%, started on oxygen.  Patient denies any history of smoking, alcohol use, alcohol withdrawal, recreational or illicit drug use, fevers, chills, chest pain, nausea, vomiting, dysuria, diarrhea. Does not take any medications at home and does not have a PCP, has not seen a doctor in several years. Patient reports he lives alone, does not have a partner or living siblings.    (25 Apr 2024 23:27)    Request for wound care consult for sacral/bilateral buttocks skin injury received from nursing. Mr. Radford was encountered on an alternating air with low air loss surface.  Darkened skin noted on sacrum/bilateral buttocks and perianal skin noted. Due to patient's current immobility and history of being found down, cannot rule out a deep tissue injury at this time.  His extreme immobility, inactivity, poor nutritional status all contribute to his high risk for pressure injury development and hinder healing.  Pressure Injury prevention and management interventions including but not limited to turning and repositioning discussed with patient.    PAST MEDICAL & SURGICAL HISTORY:  Depression  Insomnia  Environmental Allergies  BPH (Benign Prostatic Hypertrophy)  HTN (hypertension)  S/P TURP (Transurethral Resection of Prostate)    REVIEW OF SYSTEMS:  CONSTITUTIONAL: No weakness, fevers or chills  EYES/ENT: No visual changes;  No vertigo or throat pain   MOUTH: No oral lesion, moist  NECK: No pain or stiffness  RESPIRATORY: No cough, wheezing, hemoptysis; No shortness of breath  CARDIOVASCULAR: No chest pain or palpitations  GASTROINTESTINAL: No abdominal or epigastric pain. No nausea, vomiting, or hematemesis; No diarrhea or constipation. No melena or hematochezia.  GENITOURINARY: No dysuria, frequency or hematuria  NEUROLOGICAL: No numbness or weakness  SKIN: No itching, rashes  PSYCH: no confusion or altered mental status    MEDICATIONS  (STANDING):  cefTRIAXone   IVPB 1000 milliGRAM(s) IV Intermittent every 24 hours  finasteride 5 milliGRAM(s) Oral daily  heparin   Injectable 5000 Unit(s) SubCutaneous every 8 hours  sodium chloride 0.9%. 1000 milliLiter(s) (100 mL/Hr) IV Continuous <Continuous>    MEDICATIONS  (PRN):  acetaminophen     Tablet .. 650 milliGRAM(s) Oral every 6 hours PRN Temp greater or equal to 38C (100.4F), Mild Pain (1 - 3)  oxyCODONE    IR 2.5 milliGRAM(s) Oral every 6 hours PRN Moderate Pain (4 - 6)    Allergies    No Known Drug Allergies  POLLEN AND GRASS (Unknown)    Intolerances    SOCIAL HISTORY:  single, Denies smoking, ETOH, drugs    FAMILY HISTORY: no pertinent family history among first degree relatives    Vital Signs Last 24 Hrs  T(C): 36.3 (26 Apr 2024 04:49), Max: 36.4 (26 Apr 2024 00:30)  T(F): 97.3 (26 Apr 2024 04:49), Max: 97.6 (26 Apr 2024 00:30)  HR: 80 (26 Apr 2024 04:49) (67 - 117)  BP: 124/68 (26 Apr 2024 04:49) (111/78 - 173/121)  BP(mean): --  RR: 18 (26 Apr 2024 04:49) (18 - 24)  SpO2: 99% (26 Apr 2024 04:49) (80% - 100%)    Parameters below as of 26 Apr 2024 04:49  Patient On (Oxygen Delivery Method): nasal cannula    Physical Exam:  General: alert, disheveled  Ophthamology: sclera clear  ENMT: moist mucous membranes, trachea midline  Respiratory: equal chest rise with respirations  Gastrointestinal: soft NT/ND, obese abdomen  Neurology: verbal, following commands  Psych: calm, cooperative  Musculoskeletal: no contractures  Vascular: BLE edema equal  Skin:  Sacral/bilateral buttocks/scrotum/bilateral groins with mildly erythematous skin   Left face abrasion with dry, scabbing, firm, fixed   Left chest wall with deep maroon discoloration and small area of epidermal slippage, L 3cm X W 3cm x D 0.1cm, scant serosanguinous drainage  Bilateral knees with small, scattered firm, fixed, dry scabs  No odor, erythema, increased warmth, tenderness, induration, fluctuance    LABS:  04-26    143  |  105  |  67<H>  ----------------------------<  114<H>  4.0   |  23  |  1.48<H>    Ca    9.7      26 Apr 2024 06:02  Phos  3.2     04-26  Mg     2.5     04-26    TPro  6.9  /  Alb  3.9  /  TBili  0.9  /  DBili  x   /  AST  108<H>  /  ALT  87<H>  /  AlkPhos  92  04-26                          15.6   19.35 )-----------( 270      ( 26 Apr 2024 06:02 )             46.2     PT/INR - ( 25 Apr 2024 17:24 )   PT: 13.0 sec;   INR: 1.19 ratio         PTT - ( 25 Apr 2024 17:24 )  PTT:32.8 sec  Urinalysis Basic - ( 26 Apr 2024 06:02 )    Color: x / Appearance: x / SG: x / pH: x  Gluc: 114 mg/dL / Ketone: x  / Bili: x / Urobili: x   Blood: x / Protein: x / Nitrite: x   Leuk Esterase: x / RBC: x / WBC x   Sq Epi: x / Non Sq Epi: x / Bacteria: x

## 2024-04-29 NOTE — PROGRESS NOTE ADULT - PROBLEM SELECTOR PLAN 3
BUN/Cr elevated to 73/1.52 on admission, likely prerenal iso of dehydration and rhabdo from being down for 2 days plus obstruction, likely BPH. Baseline Cr in 2022 was 0.97. CT A/P noting  no hydronephrosis, nonobstructive 0.3, 0.3, and 0.5 cm left renal calculi and nonobstructive 0.3 right renal calculi. Hyperdense L renal cyst possibly hemorrhagic cyst but indeterminant. s/p 2 L IV fluids in ED    Plan:  - monitor for improvement in SCr on BMP s/p IV fluids  - monitor i's and o's  -renally dose meds  -monitor bladder scans to r/o obstruction.  -renal US showed L upper pole 2.3 cm cyst. Unclear if complex or simple  -avoid nephrotoxic agents

## 2024-04-29 NOTE — PROGRESS NOTE ADULT - PROBLEM SELECTOR PLAN 1
Patient with fall at home, found down by neighbor for 2 days, reports was too weak to get up but denies LOC or head strike. CT Head: No acute intracranial hemorrhage  CTA Chest : Mildly displaced right eighth anterolateral right rib fracture. Marked height loss of the L1 vertebral body of uncertain chronicity CT cervical/thoracic/lumbar spine w IV: No acute fracture cervical spine. No acute fracture thoracic and lumbar spine. If pain persists, follow-up MRI exam recommended (if no contraindication).    Plan:  -f/u screening ECG and TTE, monitor on tele  -Fall precautions  -PT/OT recs inpatient rehab   PMR pending

## 2024-04-30 LAB
ALBUMIN SERPL ELPH-MCNC: 3.7 G/DL — SIGNIFICANT CHANGE UP (ref 3.3–5)
ALP SERPL-CCNC: 72 U/L — SIGNIFICANT CHANGE UP (ref 40–120)
ALT FLD-CCNC: 80 U/L — HIGH (ref 10–45)
ANION GAP SERPL CALC-SCNC: 11 MMOL/L — SIGNIFICANT CHANGE UP (ref 5–17)
AST SERPL-CCNC: 60 U/L — HIGH (ref 10–40)
BILIRUB SERPL-MCNC: 0.6 MG/DL — SIGNIFICANT CHANGE UP (ref 0.2–1.2)
BUN SERPL-MCNC: 25 MG/DL — HIGH (ref 7–23)
CALCIUM SERPL-MCNC: 9.4 MG/DL — SIGNIFICANT CHANGE UP (ref 8.4–10.5)
CHLORIDE SERPL-SCNC: 105 MMOL/L — SIGNIFICANT CHANGE UP (ref 96–108)
CO2 SERPL-SCNC: 23 MMOL/L — SIGNIFICANT CHANGE UP (ref 22–31)
CREAT SERPL-MCNC: 0.82 MG/DL — SIGNIFICANT CHANGE UP (ref 0.5–1.3)
CULTURE RESULTS: SIGNIFICANT CHANGE UP
CULTURE RESULTS: SIGNIFICANT CHANGE UP
EGFR: 90 ML/MIN/1.73M2 — SIGNIFICANT CHANGE UP
GLUCOSE SERPL-MCNC: 116 MG/DL — HIGH (ref 70–99)
HCT VFR BLD CALC: 41.4 % — SIGNIFICANT CHANGE UP (ref 39–50)
HGB BLD-MCNC: 14 G/DL — SIGNIFICANT CHANGE UP (ref 13–17)
MCHC RBC-ENTMCNC: 33.6 PG — SIGNIFICANT CHANGE UP (ref 27–34)
MCHC RBC-ENTMCNC: 33.8 GM/DL — SIGNIFICANT CHANGE UP (ref 32–36)
MCV RBC AUTO: 99.3 FL — SIGNIFICANT CHANGE UP (ref 80–100)
NRBC # BLD: 0 /100 WBCS — SIGNIFICANT CHANGE UP (ref 0–0)
PLATELET # BLD AUTO: 216 K/UL — SIGNIFICANT CHANGE UP (ref 150–400)
POTASSIUM SERPL-MCNC: 3.9 MMOL/L — SIGNIFICANT CHANGE UP (ref 3.5–5.3)
POTASSIUM SERPL-SCNC: 3.9 MMOL/L — SIGNIFICANT CHANGE UP (ref 3.5–5.3)
PROT SERPL-MCNC: 6.6 G/DL — SIGNIFICANT CHANGE UP (ref 6–8.3)
RBC # BLD: 4.17 M/UL — LOW (ref 4.2–5.8)
RBC # FLD: 12.9 % — SIGNIFICANT CHANGE UP (ref 10.3–14.5)
SODIUM SERPL-SCNC: 139 MMOL/L — SIGNIFICANT CHANGE UP (ref 135–145)
SPECIMEN SOURCE: SIGNIFICANT CHANGE UP
SPECIMEN SOURCE: SIGNIFICANT CHANGE UP
WBC # BLD: 11.05 K/UL — HIGH (ref 3.8–10.5)
WBC # FLD AUTO: 11.05 K/UL — HIGH (ref 3.8–10.5)

## 2024-04-30 PROCEDURE — 52000 CYSTOURETHROSCOPY: CPT

## 2024-04-30 PROCEDURE — 99232 SBSQ HOSP IP/OBS MODERATE 35: CPT

## 2024-04-30 RX ORDER — LANOLIN ALCOHOL/MO/W.PET/CERES
3 CREAM (GRAM) TOPICAL AT BEDTIME
Refills: 0 | Status: DISCONTINUED | OUTPATIENT
Start: 2024-04-30 | End: 2024-05-08

## 2024-04-30 RX ORDER — LISINOPRIL 2.5 MG/1
5 TABLET ORAL DAILY
Refills: 0 | Status: DISCONTINUED | OUTPATIENT
Start: 2024-04-30 | End: 2024-05-08

## 2024-04-30 RX ADMIN — HEPARIN SODIUM 5000 UNIT(S): 5000 INJECTION INTRAVENOUS; SUBCUTANEOUS at 13:13

## 2024-04-30 RX ADMIN — HEPARIN SODIUM 5000 UNIT(S): 5000 INJECTION INTRAVENOUS; SUBCUTANEOUS at 21:07

## 2024-04-30 RX ADMIN — FINASTERIDE 5 MILLIGRAM(S): 5 TABLET, FILM COATED ORAL at 13:13

## 2024-04-30 RX ADMIN — Medication 3 MILLIGRAM(S): at 23:44

## 2024-04-30 RX ADMIN — LISINOPRIL 5 MILLIGRAM(S): 2.5 TABLET ORAL at 21:43

## 2024-04-30 RX ADMIN — HEPARIN SODIUM 5000 UNIT(S): 5000 INJECTION INTRAVENOUS; SUBCUTANEOUS at 06:03

## 2024-04-30 NOTE — PROVIDER CONTACT NOTE (OTHER) - BACKGROUND
Pt admitted for rhabdomyolysis
Patient admitted for mechanical fall and rhabdomylosis. S/p leger insertion and had q8 bladder scan order.

## 2024-04-30 NOTE — PROCEDURE NOTE - ADDITIONAL PROCEDURE DETAILS
Placed 16F Ute tip leger over a wire, done cystoscopically without issues. At prostatic urethra encountered two 1cm stones, possible cause of retention.     Plan:  - Please continue leger catheter  - Flomax, finasteride  - Bowel regimen  - Patient will need follow up for stones with urology as an outpatient

## 2024-04-30 NOTE — PROVIDER CONTACT NOTE (OTHER) - ACTION/TREATMENT ORDERED:
Per provider: continue to monitor
Provider ordered straight cath. Same attempted and was unsuccessful x2. Provider notified and attempted. Same unsuccessful. Pending leger insertion by urology.
Provider notified, continue to monitor

## 2024-04-30 NOTE — PROVIDER CONTACT NOTE (OTHER) - ASSESSMENT
Patient is asymptomatic. Denies chest pain and SOB. Bp 154/89, HR 89
/78, HR 81. Pt denies chest pain, sob or palpitations
Patient &Ox4. Passes small amount of urine with lots of pain.

## 2024-04-30 NOTE — PROCEDURE NOTE - NSPERFORMEDBY_GEN_A_CORE
Myself/Attending/Resident
Per pt, she lives in an apt with no steps to navigate (+elevator). Reports being independent with functional mobility/ADLs with RW. Receives homePT. Has HHA 7 days from 9-4pm (assists with cooking, cleaning, pushing WC for longer distances). Pt reports she is able to navigate around house and get back into bed independently after HHA leaves.

## 2024-04-30 NOTE — PROVIDER CONTACT NOTE (OTHER) - SITUATION
PAT 4.5 seconds up to 180s
Patient bladder scan revealed 514 mls post void of 50mls urine.
Patient had a run of AIVR for 17 beats

## 2024-05-01 LAB
ADD ON TEST-SPECIMEN IN LAB: SIGNIFICANT CHANGE UP
ANION GAP SERPL CALC-SCNC: 12 MMOL/L — SIGNIFICANT CHANGE UP (ref 5–17)
BUN SERPL-MCNC: 26 MG/DL — HIGH (ref 7–23)
CALCIUM SERPL-MCNC: 9.4 MG/DL — SIGNIFICANT CHANGE UP (ref 8.4–10.5)
CHLORIDE SERPL-SCNC: 105 MMOL/L — SIGNIFICANT CHANGE UP (ref 96–108)
CK SERPL-CCNC: 94 U/L — SIGNIFICANT CHANGE UP (ref 30–200)
CO2 SERPL-SCNC: 24 MMOL/L — SIGNIFICANT CHANGE UP (ref 22–31)
CREAT SERPL-MCNC: 0.9 MG/DL — SIGNIFICANT CHANGE UP (ref 0.5–1.3)
EGFR: 88 ML/MIN/1.73M2 — SIGNIFICANT CHANGE UP
GLUCOSE SERPL-MCNC: 107 MG/DL — HIGH (ref 70–99)
HCT VFR BLD CALC: 40 % — SIGNIFICANT CHANGE UP (ref 39–50)
HGB BLD-MCNC: 13.3 G/DL — SIGNIFICANT CHANGE UP (ref 13–17)
MCHC RBC-ENTMCNC: 33.3 GM/DL — SIGNIFICANT CHANGE UP (ref 32–36)
MCHC RBC-ENTMCNC: 33.6 PG — SIGNIFICANT CHANGE UP (ref 27–34)
MCV RBC AUTO: 101 FL — HIGH (ref 80–100)
NRBC # BLD: 0 /100 WBCS — SIGNIFICANT CHANGE UP (ref 0–0)
PLATELET # BLD AUTO: 234 K/UL — SIGNIFICANT CHANGE UP (ref 150–400)
POTASSIUM SERPL-MCNC: 3.9 MMOL/L — SIGNIFICANT CHANGE UP (ref 3.5–5.3)
POTASSIUM SERPL-SCNC: 3.9 MMOL/L — SIGNIFICANT CHANGE UP (ref 3.5–5.3)
RBC # BLD: 3.96 M/UL — LOW (ref 4.2–5.8)
RBC # FLD: 12.9 % — SIGNIFICANT CHANGE UP (ref 10.3–14.5)
SODIUM SERPL-SCNC: 141 MMOL/L — SIGNIFICANT CHANGE UP (ref 135–145)
WBC # BLD: 10.22 K/UL — SIGNIFICANT CHANGE UP (ref 3.8–10.5)
WBC # FLD AUTO: 10.22 K/UL — SIGNIFICANT CHANGE UP (ref 3.8–10.5)

## 2024-05-01 PROCEDURE — 99232 SBSQ HOSP IP/OBS MODERATE 35: CPT

## 2024-05-01 RX ADMIN — HEPARIN SODIUM 5000 UNIT(S): 5000 INJECTION INTRAVENOUS; SUBCUTANEOUS at 13:04

## 2024-05-01 RX ADMIN — Medication 3 MILLIGRAM(S): at 22:56

## 2024-05-01 RX ADMIN — HEPARIN SODIUM 5000 UNIT(S): 5000 INJECTION INTRAVENOUS; SUBCUTANEOUS at 21:05

## 2024-05-01 RX ADMIN — HEPARIN SODIUM 5000 UNIT(S): 5000 INJECTION INTRAVENOUS; SUBCUTANEOUS at 05:19

## 2024-05-01 RX ADMIN — FINASTERIDE 5 MILLIGRAM(S): 5 TABLET, FILM COATED ORAL at 13:04

## 2024-05-01 NOTE — PROGRESS NOTE ADULT - PROBLEM SELECTOR PLAN 1
Patient with fall at home, found down by neighbor for 2 days, reports was too weak to get up but denies LOC or head strike. CT Head: No acute intracranial hemorrhage  CTA Chest : Mildly displaced right eighth anterolateral right rib fracture. Marked height loss of the L1 vertebral body of uncertain chronicity CT cervical/thoracic/lumbar spine w IV: No acute fracture cervical spine. No acute fracture thoracic and lumbar spine. If pain persists, follow-up MRI exam recommended (if no contraindication).  Plan:  -ECG NSR  -TTE normal EF  -Fall precautions  -PT/OT recs rehab   PMR recs inpatient rehab

## 2024-05-01 NOTE — PROGRESS NOTE ADULT - PROBLEM SELECTOR PLAN 2
CK 3521 iso being down for 2 days at home and inability to get up. UA with moderate blood, 2 RBCs c/w rhabdo. Pt received 2 L IV fluid bolus in ED    Plan:  -Monitor renal function on BMP  RESOLVING CK 3521 iso being down for 2 days at home and inability to get up. UA with moderate blood, 2 RBCs c/w rhabdo. Pt received 2 L IV fluid bolus in ED    Plan:  -Monitor renal function on BMP  Resolved - cpk < 100

## 2024-05-01 NOTE — PROGRESS NOTE ADULT - PROBLEM SELECTOR PLAN 8
Pt with urinary retention in ED, leger placed.    Plan:  -c/w finasteride  - Monitor i's and o's  Trial of Void done (4/27)

## 2024-05-02 DIAGNOSIS — I10 ESSENTIAL (PRIMARY) HYPERTENSION: ICD-10-CM

## 2024-05-02 PROCEDURE — 99233 SBSQ HOSP IP/OBS HIGH 50: CPT

## 2024-05-02 PROCEDURE — 99232 SBSQ HOSP IP/OBS MODERATE 35: CPT

## 2024-05-02 RX ORDER — NIFEDIPINE 30 MG
30 TABLET, EXTENDED RELEASE 24 HR ORAL DAILY
Refills: 0 | Status: DISCONTINUED | OUTPATIENT
Start: 2024-05-02 | End: 2024-05-08

## 2024-05-02 RX ADMIN — LISINOPRIL 5 MILLIGRAM(S): 2.5 TABLET ORAL at 05:25

## 2024-05-02 RX ADMIN — HEPARIN SODIUM 5000 UNIT(S): 5000 INJECTION INTRAVENOUS; SUBCUTANEOUS at 21:41

## 2024-05-02 RX ADMIN — Medication 650 MILLIGRAM(S): at 21:41

## 2024-05-02 RX ADMIN — Medication 3 MILLIGRAM(S): at 21:41

## 2024-05-02 RX ADMIN — HEPARIN SODIUM 5000 UNIT(S): 5000 INJECTION INTRAVENOUS; SUBCUTANEOUS at 05:25

## 2024-05-02 RX ADMIN — FINASTERIDE 5 MILLIGRAM(S): 5 TABLET, FILM COATED ORAL at 11:34

## 2024-05-02 RX ADMIN — Medication 650 MILLIGRAM(S): at 22:14

## 2024-05-02 RX ADMIN — Medication 30 MILLIGRAM(S): at 11:34

## 2024-05-02 NOTE — PROGRESS NOTE ADULT - PROBLEM SELECTOR PLAN 7
CT noting L1 vertebra loss of height, fracture of unclear chronicity. Neurosurg evaluated noting L1 schmorl’s node w/ very-very mild A3 burst fx minimally displaced, TLICS 2. CT C spine with sclerotic lesion to C3 pedicle SINS 6.    Plan:  -neurosurgery consulted, recs appreciated  -pain management w tylenol q6 prn for mild, oxy 2.5 mg q6 prn for moderate pain  -no acute neurosurgery intervention  -TLSO brace when OOB  -AP/Lat/Flex/Ex lumbar X-rays.  -output f/u w/Dr. Rodrigues in 4-6W   -defer to heme/onc regarding possible w/u of C3 lesion given low SINS and lack of symptoms CTA Chest : Mildly displaced right eighth anterolateral right rib fracture, with limited pain    Plan:  -pain regimen as above  -continue to monitor

## 2024-05-02 NOTE — PROGRESS NOTE ADULT - PROBLEM SELECTOR PLAN 8
Pt with urinary retention in ED, leger placed.    Plan:  -c/w finasteride  - Monitor i's and o's  Trial of Void done (4/27) CT noting L1 vertebra loss of height, fracture of unclear chronicity. Neurosurg evaluated noting L1 schmorl’s node w/ very-very mild A3 burst fx minimally displaced, TLICS 2. CT C spine with sclerotic lesion to C3 pedicle SINS 6.    Plan:  -neurosurgery consulted, recs appreciated  -pain management w tylenol q6 prn for mild, oxy 2.5 mg q6 prn for moderate pain  -no acute neurosurgery intervention  -TLSO brace when OOB  -AP/Lat/Flex/Ex lumbar X-rays.  -output f/u w/Dr. Rodrigues in 4-6W   -defer to heme/onc regarding possible w/u of C3 lesion given low SINS and lack of symptoms

## 2024-05-02 NOTE — PROGRESS NOTE ADULT - PROBLEM SELECTOR PLAN 9
DVT ppx: heparin subq  Diet: Regular  GOC: FULL CODE Pt with urinary retention in ED, leger placed.    Plan:  -c/w finasteride  - Monitor i's and o's  Trial of Void done (4/27)

## 2024-05-02 NOTE — PROGRESS NOTE ADULT - PROBLEM SELECTOR PLAN 4
Pt afebrile on admission, SIRS criteria met with tachycardia, tachypnea. Had AHRF to SpO2 80%, placed on 6L NC. CXR neg for PNA, UA collected after CTX 1 g in ED. CTA Chest neg for PE. CT without focus of infection, possible urinary source. No prior hx of MDR organisms    Plan:  s/p empiric Ceftriaxone   Blood and urine cultures finalized- negative   RVP negative  -Monitor wbc and fever curve BUN/Cr elevated to 73/1.52 on admission, likely prerenal iso of dehydration and rhabdo from being down for 2 days plus obstruction, likely BPH. Baseline Cr in 2022 was 0.97. CT A/P noting  no hydronephrosis, nonobstructive 0.3, 0.3, and 0.5 cm left renal calculi and nonobstructive 0.3 right renal calculi. Hyperdense L renal cyst possibly hemorrhagic cyst but indeterminant. s/p 2 L IV fluids in ED    Plan:  - monitor for improvement in SCr on BMP s/p IV fluids  - monitor i's and o's  -renally dose meds  -monitor bladder scans to r/o obstruction.  -renal US showed L upper pole 2.3 cm cyst. Unclear if complex or simple  -avoid nephrotoxic agents

## 2024-05-02 NOTE — PROGRESS NOTE ADULT - PROBLEM SELECTOR PLAN 1
Patient with fall at home, found down by neighbor for 2 days, reports was too weak to get up but denies LOC or head strike. CT Head: No acute intracranial hemorrhage  CTA Chest : Mildly displaced right eighth anterolateral right rib fracture. Marked height loss of the L1 vertebral body of uncertain chronicity CT cervical/thoracic/lumbar spine w IV: No acute fracture cervical spine. No acute fracture thoracic and lumbar spine. If pain persists, follow-up MRI exam recommended (if no contraindication).  Plan:  -ECG NSR  -TTE normal EF  -Fall precautions  -PT/OT recs rehab   PMR recs inpatient rehab -170s  c/w lisinoptil  Start nifedipine 30 mg xl QD. Increase dose as tolerated

## 2024-05-02 NOTE — PROGRESS NOTE ADULT - PROBLEM SELECTOR PLAN 2
CK 3521 iso being down for 2 days at home and inability to get up. UA with moderate blood, 2 RBCs c/w rhabdo. Pt received 2 L IV fluid bolus in ED    Plan:  -Monitor renal function on BMP  Resolved - cpk < 100 Patient with fall at home, found down by neighbor for 2 days, reports was too weak to get up but denies LOC or head strike. CT Head: No acute intracranial hemorrhage  CTA Chest : Mildly displaced right eighth anterolateral right rib fracture. Marked height loss of the L1 vertebral body of uncertain chronicity CT cervical/thoracic/lumbar spine w IV: No acute fracture cervical spine. No acute fracture thoracic and lumbar spine. If pain persists, follow-up MRI exam recommended (if no contraindication).  Plan:  -ECG NSR  -TTE normal EF  -Fall precautions  -PT/OT recs rehab   PMR recs inpatient rehab

## 2024-05-02 NOTE — PROGRESS NOTE ADULT - PROBLEM SELECTOR PLAN 5
Patient presented with AHRF, saturation 80%, improved s/p 6 L O2, now on 2L O2. CXR negative for acute pathology. CTA negative for PE. Likely iso poor respirations due to rib fracture    Plan:  -monitor SpO2  -Wean O2 as tolerated. Pt afebrile on admission, SIRS criteria met with tachycardia, tachypnea. Had AHRF to SpO2 80%, placed on 6L NC. CXR neg for PNA, UA collected after CTX 1 g in ED. CTA Chest neg for PE. CT without focus of infection, possible urinary source. No prior hx of MDR organisms    Plan:  s/p empiric Ceftriaxone   Blood and urine cultures finalized- negative   RVP negative  -Monitor wbc and fever curve

## 2024-05-02 NOTE — PROGRESS NOTE ADULT - PROBLEM SELECTOR PLAN 3
BUN/Cr elevated to 73/1.52 on admission, likely prerenal iso of dehydration and rhabdo from being down for 2 days plus obstruction, likely BPH. Baseline Cr in 2022 was 0.97. CT A/P noting  no hydronephrosis, nonobstructive 0.3, 0.3, and 0.5 cm left renal calculi and nonobstructive 0.3 right renal calculi. Hyperdense L renal cyst possibly hemorrhagic cyst but indeterminant. s/p 2 L IV fluids in ED    Plan:  - monitor for improvement in SCr on BMP s/p IV fluids  - monitor i's and o's  -renally dose meds  -monitor bladder scans to r/o obstruction.  -renal US showed L upper pole 2.3 cm cyst. Unclear if complex or simple  -avoid nephrotoxic agents CK 3521 iso being down for 2 days at home and inability to get up. UA with moderate blood, 2 RBCs c/w rhabdo. Pt received 2 L IV fluid bolus in ED  Plan:  -Monitor renal function on BMP  Resolved - cpk < 100

## 2024-05-02 NOTE — PROGRESS NOTE ADULT - PROBLEM SELECTOR PLAN 6
CTA Chest : Mildly displaced right eighth anterolateral right rib fracture, with limited pain    Plan:  -pain regimen as above  -continue to monitor Patient presented with AHRF, saturation 80%, improved s/p 6 L O2, now on 2L O2. CXR negative for acute pathology. CTA negative for PE. Likely iso poor respirations due to rib fracture    Plan:  -monitor SpO2  -Wean O2 as tolerated.

## 2024-05-03 PROCEDURE — 99232 SBSQ HOSP IP/OBS MODERATE 35: CPT

## 2024-05-03 RX ADMIN — HEPARIN SODIUM 5000 UNIT(S): 5000 INJECTION INTRAVENOUS; SUBCUTANEOUS at 05:35

## 2024-05-03 RX ADMIN — HEPARIN SODIUM 5000 UNIT(S): 5000 INJECTION INTRAVENOUS; SUBCUTANEOUS at 14:52

## 2024-05-03 RX ADMIN — HEPARIN SODIUM 5000 UNIT(S): 5000 INJECTION INTRAVENOUS; SUBCUTANEOUS at 22:00

## 2024-05-03 RX ADMIN — FINASTERIDE 5 MILLIGRAM(S): 5 TABLET, FILM COATED ORAL at 11:54

## 2024-05-03 RX ADMIN — Medication 30 MILLIGRAM(S): at 06:12

## 2024-05-03 RX ADMIN — LISINOPRIL 5 MILLIGRAM(S): 2.5 TABLET ORAL at 05:35

## 2024-05-03 RX ADMIN — Medication 3 MILLIGRAM(S): at 22:00

## 2024-05-04 ENCOUNTER — TRANSCRIPTION ENCOUNTER (OUTPATIENT)
Age: 79
End: 2024-05-04

## 2024-05-04 PROCEDURE — 99232 SBSQ HOSP IP/OBS MODERATE 35: CPT

## 2024-05-04 RX ADMIN — Medication 30 MILLIGRAM(S): at 05:08

## 2024-05-04 RX ADMIN — FINASTERIDE 5 MILLIGRAM(S): 5 TABLET, FILM COATED ORAL at 13:24

## 2024-05-04 RX ADMIN — HEPARIN SODIUM 5000 UNIT(S): 5000 INJECTION INTRAVENOUS; SUBCUTANEOUS at 05:08

## 2024-05-04 RX ADMIN — LISINOPRIL 5 MILLIGRAM(S): 2.5 TABLET ORAL at 05:08

## 2024-05-04 RX ADMIN — HEPARIN SODIUM 5000 UNIT(S): 5000 INJECTION INTRAVENOUS; SUBCUTANEOUS at 21:28

## 2024-05-04 RX ADMIN — Medication 3 MILLIGRAM(S): at 21:28

## 2024-05-04 RX ADMIN — HEPARIN SODIUM 5000 UNIT(S): 5000 INJECTION INTRAVENOUS; SUBCUTANEOUS at 13:21

## 2024-05-04 NOTE — PROGRESS NOTE ADULT - PROBLEM SELECTOR PLAN 3
CK 3521 iso being down for 2 days at home and inability to get up. UA with moderate blood, 2 RBCs c/w rhabdo. Pt received 2 L IV fluid bolus in ED  Plan:  -Monitor renal function on BMP  Resolved - cpk < 100

## 2024-05-05 PROCEDURE — 99232 SBSQ HOSP IP/OBS MODERATE 35: CPT

## 2024-05-05 RX ADMIN — FINASTERIDE 5 MILLIGRAM(S): 5 TABLET, FILM COATED ORAL at 12:04

## 2024-05-05 RX ADMIN — LISINOPRIL 5 MILLIGRAM(S): 2.5 TABLET ORAL at 06:07

## 2024-05-05 RX ADMIN — HEPARIN SODIUM 5000 UNIT(S): 5000 INJECTION INTRAVENOUS; SUBCUTANEOUS at 21:25

## 2024-05-05 RX ADMIN — Medication 30 MILLIGRAM(S): at 06:07

## 2024-05-05 RX ADMIN — Medication 3 MILLIGRAM(S): at 21:24

## 2024-05-05 RX ADMIN — HEPARIN SODIUM 5000 UNIT(S): 5000 INJECTION INTRAVENOUS; SUBCUTANEOUS at 06:07

## 2024-05-05 RX ADMIN — HEPARIN SODIUM 5000 UNIT(S): 5000 INJECTION INTRAVENOUS; SUBCUTANEOUS at 12:05

## 2024-05-05 NOTE — PROGRESS NOTE ADULT - PROBLEM SELECTOR PLAN 10
DVT ppx: heparin subq  Diet: Regular  GOC: FULL CODE

## 2024-05-05 NOTE — PROGRESS NOTE ADULT - NSPROGADDITIONALINFOA_GEN_ALL_CORE
pend - rehab auth pend - rehab auth - Called [phone] for [id number] and left voicemail, pending callback

## 2024-05-06 LAB — GLUCOSE BLDC GLUCOMTR-MCNC: 114 MG/DL — HIGH (ref 70–99)

## 2024-05-06 PROCEDURE — 99233 SBSQ HOSP IP/OBS HIGH 50: CPT

## 2024-05-06 RX ORDER — LIDOCAINE 4 G/100G
1 CREAM TOPICAL DAILY
Refills: 0 | Status: DISCONTINUED | OUTPATIENT
Start: 2024-05-06 | End: 2024-05-08

## 2024-05-06 RX ADMIN — Medication 650 MILLIGRAM(S): at 04:34

## 2024-05-06 RX ADMIN — HEPARIN SODIUM 5000 UNIT(S): 5000 INJECTION INTRAVENOUS; SUBCUTANEOUS at 21:54

## 2024-05-06 RX ADMIN — LISINOPRIL 5 MILLIGRAM(S): 2.5 TABLET ORAL at 05:32

## 2024-05-06 RX ADMIN — Medication 3 MILLIGRAM(S): at 21:55

## 2024-05-06 RX ADMIN — FINASTERIDE 5 MILLIGRAM(S): 5 TABLET, FILM COATED ORAL at 12:49

## 2024-05-06 RX ADMIN — HEPARIN SODIUM 5000 UNIT(S): 5000 INJECTION INTRAVENOUS; SUBCUTANEOUS at 12:49

## 2024-05-06 RX ADMIN — HEPARIN SODIUM 5000 UNIT(S): 5000 INJECTION INTRAVENOUS; SUBCUTANEOUS at 05:32

## 2024-05-06 RX ADMIN — Medication 650 MILLIGRAM(S): at 05:34

## 2024-05-06 RX ADMIN — Medication 30 MILLIGRAM(S): at 05:32

## 2024-05-06 NOTE — PROGRESS NOTE ADULT - PROBLEM SELECTOR PLAN 3
Yes... resolved  CXR negative for acute pathology. CTA negative for PE. Likely atelectasis 2.2 poor respirations due to rib fracture  - weaned off 6L O2 now on RA

## 2024-05-06 NOTE — PROGRESS NOTE ADULT - PROBLEM SELECTOR PLAN 6
failed TOV 4/27, will reattempt today  - c/w finasteride  - TOV today  - Monitor i's and o's failed TOV, required cystoscopically placed leger per urology and found then to have two 1 cm stones likely causing obstruction  - c/w finasteride  - repeat TOV today  - outpt urology follow up  - Monitor i's and o's

## 2024-05-06 NOTE — PROGRESS NOTE ADULT - PROBLEM SELECTOR PLAN 2
resolved s/p IVF  prerenal iso of dehydration and rhabdo from being down for 2 days plus obstruction, likely BPH  Renal u/s confirms ~2 cm hemorrhagic cyst seen on CT  - avoid nephrotoxic agents  - outpt follow up for renal cyst

## 2024-05-06 NOTE — PROGRESS NOTE ADULT - PROBLEM SELECTOR PLAN 1
CT w/ Mildly displaced right eighth anterolateral right rib fracture.  Marked height loss of the L1 vertebral body of uncertain chronicity  TTE normal EF  - PT rec for [facility] most recently. Acute rehab request denied by insurance, I spoke w/ Dr Hogue for [id number] today. Plan for [facility] placement vs home with private hire

## 2024-05-06 NOTE — PROGRESS NOTE ADULT - PROBLEM SELECTOR PLAN 8
DVT ppx: heparin subq  Dispo: DC planning per CM to [facility] vs home w/ private hire. Pt's insurance denied acute rehab as he is able to ambulate 100 ft and does not have qualifying diagnosis. He is medically clear for discharge.

## 2024-05-06 NOTE — PROGRESS NOTE ADULT - PROBLEM SELECTOR PLAN 5
CT noting L1 vertebra loss of height, fracture of unclear chronicity. Neurosurg evaluated noting L1 schmorl’s node w/ very-very mild A3 burst fx minimally displaced, TLICS 2. CT C spine with sclerotic lesion to C3 pedicle SINS 6.  - neurosurgery consulted, recs appreciated, no acute neurosurgery intervention  - TLSO brace when OOB  - output f/u w/Dr. Rodrigues in 4-6W   - outpt follow w/ PCP re: C3 lesion

## 2024-05-07 PROCEDURE — 99232 SBSQ HOSP IP/OBS MODERATE 35: CPT

## 2024-05-07 RX ORDER — TAMSULOSIN HYDROCHLORIDE 0.4 MG/1
1 CAPSULE ORAL
Qty: 0 | Refills: 0 | DISCHARGE
Start: 2024-05-07

## 2024-05-07 RX ORDER — LISINOPRIL 2.5 MG/1
1 TABLET ORAL
Qty: 0 | Refills: 0 | DISCHARGE
Start: 2024-05-07

## 2024-05-07 RX ORDER — LIDOCAINE 4 G/100G
1 CREAM TOPICAL
Qty: 0 | Refills: 0 | DISCHARGE
Start: 2024-05-07

## 2024-05-07 RX ORDER — ACETAMINOPHEN 500 MG
2 TABLET ORAL
Qty: 0 | Refills: 0 | DISCHARGE
Start: 2024-05-07

## 2024-05-07 RX ORDER — NIFEDIPINE 30 MG
1 TABLET, EXTENDED RELEASE 24 HR ORAL
Qty: 0 | Refills: 0 | DISCHARGE
Start: 2024-05-07

## 2024-05-07 RX ORDER — TAMSULOSIN HYDROCHLORIDE 0.4 MG/1
0.4 CAPSULE ORAL AT BEDTIME
Refills: 0 | Status: DISCONTINUED | OUTPATIENT
Start: 2024-05-07 | End: 2024-05-08

## 2024-05-07 RX ADMIN — HEPARIN SODIUM 5000 UNIT(S): 5000 INJECTION INTRAVENOUS; SUBCUTANEOUS at 05:24

## 2024-05-07 RX ADMIN — TAMSULOSIN HYDROCHLORIDE 0.4 MILLIGRAM(S): 0.4 CAPSULE ORAL at 21:30

## 2024-05-07 RX ADMIN — HEPARIN SODIUM 5000 UNIT(S): 5000 INJECTION INTRAVENOUS; SUBCUTANEOUS at 21:30

## 2024-05-07 RX ADMIN — Medication 3 MILLIGRAM(S): at 21:30

## 2024-05-07 RX ADMIN — FINASTERIDE 5 MILLIGRAM(S): 5 TABLET, FILM COATED ORAL at 13:06

## 2024-05-07 RX ADMIN — Medication 30 MILLIGRAM(S): at 05:24

## 2024-05-07 RX ADMIN — HEPARIN SODIUM 5000 UNIT(S): 5000 INJECTION INTRAVENOUS; SUBCUTANEOUS at 13:06

## 2024-05-07 RX ADMIN — LISINOPRIL 5 MILLIGRAM(S): 2.5 TABLET ORAL at 05:24

## 2024-05-07 NOTE — PROGRESS NOTE ADULT - TIME BILLING
- Ordering, reviewing, and/or interpreting labs, testing, and/or imaging  - Independently obtaining a review of systems and performing a physical exam  - Reviewing prior records and where necessary, outpatient records  - Counselling and educating patient and/or family regarding interpretation of aforementioned items and plan of care
- Ordering, reviewing, and/or interpreting labs, testing, and/or imaging  - Independently obtaining a review of systems and performing a physical exam  - Reviewing prior records and where necessary, outpatient records  - Counselling and educating patient and/or family regarding interpretation of aforementioned items and plan of care
Time-based billing (NON-critical care).     The necessity of the time spent during the encounter on this date of service was due to:     - Ordering, reviewing, and interpreting labs, testing, and imaging.  - Independently obtaining a review of systems and performing a physical exam  - Reviewing prior hospitalization and where necessary, outpatient records.  - Counselling and educating patient  regarding interpretation of aforementioned items and plan of care.
- Ordering, reviewing, and/or interpreting labs, testing, and/or imaging  - Independently obtaining a review of systems and performing a physical exam  - Reviewing prior records and where necessary, outpatient records  - Counselling and educating patient and/or family regarding interpretation of aforementioned items and plan of care
- Reviewing, and interpreting labs and testing.  - Independently obtaining a review of systems and performing a physical exam  - Counselling and educating patient regarding interpretation of aforementioned items and plan of care.
- Ordering, reviewing, and/or interpreting labs, testing, and/or imaging  - Independently obtaining a review of systems and performing a physical exam  - Reviewing prior records and where necessary, outpatient records  - Counselling and educating patient and/or family regarding interpretation of aforementioned items and plan of care
- Reviewing, and interpreting labs and testing.  - Independently obtaining a review of systems and performing a physical exam  - Counselling and educating patient regarding interpretation of aforementioned items and plan of care.
- Ordering, reviewing, and/or interpreting labs, testing, and/or imaging  - Independently obtaining a review of systems and performing a physical exam  - Reviewing prior records and where necessary, outpatient records  - Counselling and educating patient and/or family regarding interpretation of aforementioned items and plan of care
- Ordering, reviewing, and/or interpreting labs, testing, and/or imaging  - Independently obtaining a review of systems and performing a physical exam  - Reviewing prior records and where necessary, outpatient records  - Counselling and educating patient and/or family regarding interpretation of aforementioned items and plan of care

## 2024-05-07 NOTE — PROGRESS NOTE ADULT - PROBLEM SELECTOR PLAN 3
resolved  CXR negative for acute pathology. CTA negative for PE. Likely atelectasis 2.2 poor respirations due to rib fracture  - weaned off 6L O2 now on RA

## 2024-05-07 NOTE — PROGRESS NOTE ADULT - PROBLEM SELECTOR PLAN 8
DVT ppx: heparin subq  Dispo: medically clear, DC to [facility] pending auth. His AR was denied by insurance.

## 2024-05-07 NOTE — PROGRESS NOTE ADULT - SUBJECTIVE AND OBJECTIVE BOX
SUBJECTIVE / OVERNIGHT EVENTS:  Today is hospital day 6d. There are no new issues or overnight events.   Did not endorse any headache, lightheadedness, vertigo, shortness of breathe, cough, chest pain, palpitations, tachycardia, abdominal pain, nausea, vomiting, diarrhea or constipation currently    HPI:  78-year-old male with a history of HTN, bipolar disorder, depression, personality disorder with past ECT treatment, prostate ca s/p TURP presents after a fall that occurred 2 days ago while cleaning. Patient denies head strike or LOC but was unable to get up and was found on the ground by his neighbor after he was heard screaming. The neighbor activated EMS and pt was found to be hypoxemic to 80%, started on oxygen.  Patient denies any history of smoking, alcohol use, alcohol withdrawal, recreational or illicit drug use, fevers, chills, chest pain, nausea, vomiting, dysuria, diarrhea. Does not take any medications at home and does not have a PCP, has not seen a doctor in several years. Patient reports he lives alone, does not have a partner or living siblings.     On arrival T 97.1, HR , -173/, RR 20-24, Sat 80%, placed on 6 LNC, now on 2L.   Labs notable for WBC 22.91, neutrophilic predominance 81.8%, Hb 17.8, plt 344. Na 146, BUN/Cr 73/1.52, Ca 10.8. , AST//47, CK 3521. Blood alcohol content <10. UA w 15 ketones, neg nitrites, neg leuk esterase, 0 wbc, neg bacteria, moderate blood, 2 RBCs    CXR: No evidence of acute pulmonary disease or trauma. Xray pelvis: No fracture. No dislocation.  CT Head: No acute intracranial hemorrhage  CTA Chest : Mildly displaced right eighth anterolateral right rib fracture. Marked height loss of the L1 vertebral body of uncertain chronicity but new since the previous exam of 9/25/2014. Correlate clinically for pain. No pulmonary embolism. Hyperdense left renal cyst possibly hemorrhagic cyst but remains indeterminant. Recommend nonemergent renal ultrasound. CT cervical/thoracic/lumbar spine w IV: No acute fracture cervical spine. No acute fracture thoracic and lumbar spine. If pain persists, follow-up MRI exam recommended (if no contraindication).    s/p Ceftriaxone 1 g, 2 L LR bolus, 4 mg IV morphine in the ED   (25 Apr 2024 23:27)    MEDICATIONS  (STANDING):  finasteride 5 milliGRAM(s) Oral daily  heparin   Injectable 5000 Unit(s) SubCutaneous every 8 hours  lisinopril 5 milliGRAM(s) Oral daily  melatonin 3 milliGRAM(s) Oral at bedtime    MEDICATIONS  (PRN):  acetaminophen     Tablet .. 650 milliGRAM(s) Oral every 6 hours PRN Temp greater or equal to 38C (100.4F), Mild Pain (1 - 3)  oxyCODONE    IR 2.5 milliGRAM(s) Oral every 6 hours PRN Moderate Pain (4 - 6)    HOME MEDICATIONS:  finasteride 5 mg oral tablet: 1 tab(s) orally once a day  TLSO Brace: Tlso    PHYSICAL EXAM  Vital Signs Last 24 Hrs  T(C): 36.4 (01 May 2024 05:12), Max: 36.7 (30 Apr 2024 20:35)  T(F): 97.5 (01 May 2024 05:12), Max: 98 (30 Apr 2024 20:35)  HR: 73 (01 May 2024 05:12) (66 - 81)  BP: 113/72 (01 May 2024 05:12) (113/72 - 180/95)  BP(mean): --  RR: 18 (01 May 2024 05:12) (18 - 18)  SpO2: 97% (01 May 2024 05:12) (96% - 97%)    Parameters below as of 01 May 2024 05:12  Patient On (Oxygen Delivery Method): room air        04-30-24 @ 07:01  -  05-01-24 @ 07:00  --------------------------------------------------------  IN: 320 mL / OUT: 2300 mL / NET: -1980 mL      CONSTITUTIONAL: Well-groomed, in no apparent distress;  EYES: No conjunctival or scleral injection, non-icteric;  ENMT: No external nasal lesions; Normal outer ears;  NECK: Trachea midline;  RESPIRATORY: Normal respiratory effort; Decreased breathe sounds bilaterally without wheeze/rhonchi/rales;  CARDIOVASCULAR: Regular rate and rhythm;  GASTROINTESTINAL: Non-distended; No palpable masses; No rebound/guarding;  EXTREMITIES:  No lower extremity edema;  NEUROLOGY: A+O to person, place, and time; Does respond to commands appropriately;  PSYCHIATRY: Mood and Affect appropriate    LABS:                        13.3   10.22 )-----------( 234      ( 01 May 2024 07:06 )             40.0     05-01    141  |  105  |  26<H>  ----------------------------<  107<H>  3.9   |  24  |  0.90    Ca    9.4      01 May 2024 07:02    TPro  6.6  /  Alb  3.7  /  TBili  0.6  /  DBili  x   /  AST  60<H>  /  ALT  80<H>  /  AlkPhos  72  04-30          Urinalysis Basic - ( 01 May 2024 07:02 )    Color: x / Appearance: x / SG: x / pH: x  Gluc: 107 mg/dL / Ketone: x  / Bili: x / Urobili: x   Blood: x / Protein: x / Nitrite: x   Leuk Esterase: x / RBC: x / WBC x   Sq Epi: x / Non Sq Epi: x / Bacteria: x            RADIOLOGY & ADDITIONAL TESTS:  EKG  12 Lead ECG:   Ventricular Rate 79 BPM    Atrial Rate 79 BPM    P-R Interval 144 ms    QRS Duration 102 ms    Q-T Interval 388 ms    QTC Calculation(Bazett) 444 ms    P Axis 26 degrees    R Axis -13 degrees    T Axis 47 degrees    Diagnosis Line NORMAL SINUS RHYTHM  NORMAL ECG  WHEN COMPARED WITH ECG OF 25-APR-2024 17:26, (UNCONFIRMED)  NO SIGNIFICANT CHANGE WAS FOUND    Confirmed by MD Patton Ronald ([id number]) on 4/29/2024 9:33:27 AM (04-26-24 @ 04:22)  12 Lead ECG:   Ventricular Rate 106 BPM    Atrial Rate 106 BPM    P-R Interval 134 ms    QRS Duration 92 ms    Q-T Interval 336 ms    QTC Calculation(Bazett) 446 ms    P Axis 28 degrees    R Axis -27 degrees    T Axis 46 degrees    Diagnosis Line BASELINE ARTIFACT  SINUS TACHYCARDIA  PLEASE REPEAT    Confirmed by MD Patton Ronald ([id number]) on 4/29/2024 11:47:43 AM (04-25-24 @ 17:26)    CT Angio Chest PE Protocol w/ IV Cont:   ACC: [id number] EXAM:  CT ANGIO CHEST PULM ART WAWIC   ORDERED BY: ALEXANDRA DUTTON     ACC: [id number] EXAM:  CT ABDOMEN AND PELVIS IC   ORDERED BY: ALEXANDRA DUTTON     PROCEDURE DATE:  04/25/2024          INTERPRETATION:  CLINICAL INFORMATION:Trauma code    COMPARISON: CT abdomen pelvis 9/25/2014.    CONTRAST/COMPLICATIONS:  IV Contrast: IV contrast documented in unlinked concurrent exam   (accession [id number]), Omnipaque 350 (accession [id number])  90 cc   administered   10 cc discarded  Oral Contrast: NONE  Complications: None reported at time of study completion    PROCEDURE:  CT of the Chest, Abdomen and Pelvis was performed.  Imaging was performed through the chest in the arterial phase followed by   imaging of the abdomen and pelvis in the portal venous phase.  Sagittal and coronal reformats were performed.    FINDINGS:  CHEST:  LUNGS AND LARGE AIRWAYS: Patent central airways. Mild bibasilar   atelectasis right greater than left. Numerous bilateral subcentimeter   calcified granulomas.  PLEURA: No pleural effusion.  VESSELS: No pulmonary embolism to level of the subsegmental arteries.   Coronary artery calcifications..  HEART: Heart size is normal. No pericardial effusion.  MEDIASTINUM AND MELVIN: No lymphadenopathy.  CHEST WALL AND LOWER NECK: Within normal limits.    ABDOMEN AND PELVIS:  LIVER: Subcentimeter hypodensity in the periphery of the right lobe of   liver.  BILE DUCTS: Normal caliber.  GALLBLADDER: Cholelithiasis.  SPLEEN: Within normal limits.  PANCREAS: Pancreatic calcifications..  ADRENALS: Within normal limits.  KIDNEYS/URETERS: No hydronephrosis. Nonobstructive 0.3, 0.3, and 0.5 cm   left renal calculi. Nonobstructive 0.3 right renal calculi. Hyperdense   exophytic left upper pole renal nodule measuring 2.7 x 2.5 cm. This may   represent a hemorrhagic cyst but remains indeterminant. Recommend   nonemergent renal ultrasound. Right renal cysts and subcentimeter   hypodensities, too small to stress.    BLADDER: Within normal limits.  REPRODUCTIVE ORGANS: Prostate is enlarged. Penile pump and reservoir.    BOWEL: No bowel obstruction. Appendix is normal.  PERITONEUM: No ascites.  VESSELS: Atherosclerotic changes.  RETROPERITONEUM/LYMPH NODES: No lymphadenopathy.  ABDOMINAL WALL: Bowel-containing left inguinal hernia. Left gluteal   lipoma. Small fat-containing right inguinal hernia.  BONES: Degenerative changes. Lumbar levoscoliosis.  Mildly displaced right eighth anterolateral right rib fracture.  Marked height loss of the L1 vertebral body of uncertain chronicity but   new since the previous exam of 9/25/2014. Correlate clinically for pain.    IMPRESSION:    Mildly displaced right eighth anterolateral right rib fracture. Marked   height loss of the L1 vertebral body of uncertain chronicity but new   since the previous exam of 9/25/2014. Correlate clinically for pain.    No pulmonary embolism.    Hyperdense left renal cyst possibly hemorrhagic cyst but remains   indeterminant. Recommend nonemergent renal ultrasound.        --- End of Report ---           MYRIAM FLOWERS MD; Resident Radiologist  This document has been electronically signed.  GEOVANNA BLANCA MD; Attending Radiologist  This document has been electronically signed. Apr 25 2024  8:58PM (04-25-24 @ 20:19)  CT Head No Cont:   ACC: [id number] EXAM:  CT REFORM SPINE T  IC   ORDERED BY: ALEXANDRA DUTTON     ACC: [id number] EXAM:  CT REFORM SPINE L IC   ORDERED BY: ALEXANDRA DUTTON     ACC: [id number] EXAM:  CT BRAIN   ORDERED BY: ALEXANDRA DUTTON     ACC: [id number] EXAM:  CTCERVICAL SPINE   ORDERED BY: ALEXANDRA DUTTON     PROCEDURE DATE:  04/25/2024          INTERPRETATION:  CLINICAL STATEMENT: Trauma..    TECHNIQUE: CT of the head and cervical spine were performed without IV   contrast. 3-D/MIP images obtained    COMPARISON: CT head 2/1/2010    FINDINGS:  Head:  There is mild diffuse parenchymal volume loss.    There are areas of low attenuation in the periventricular white matter   likely related to mild chronic microvascular ischemic changes.    There is no acute intracranial hemorrhage, parenchymal mass, mass effect   or midline shift. There is no acute territorial infarct. There is no   hydrocephalus. Partial empty sella noted    The cranium is intact.    Retention cyst/polyps and nasal sinuses.    Cervical spine:  The vertebral body heights are intact. Grade 1 anterolisthesis C2 on C3,   C7 on T1    Nonspecific small sclerotic lesion C3 left pedicle noted.    Evaluation of the spinal canal is limited on a CT exam.  Multilevel   degenerative changes noted resulting in multilevel spinal canal stenosis   and neural foraminal narrowing.    Thoracic and lumbar spine reformats:  The thoracic vertebral body heights and alignment are maintained.    Moderate compression fracture L1 vertebral body noted appears chronic.   Mild posterior retropulsion results in mild spinal canal stenosis.   Remaining lumbar vertebral body heights intact. Scoliosis noted.    Evaluation of the spinal canal is limited on a CT exam.  Multilevel   degenerative changes noted resulting in multilevel spinal canal stenosis   and neural foraminal narrowing.      IMPRESSION:  No acute intracranial hemorrhage    No acute fracture cervical spine. If pain persists, follow-up MRI exam   recommended (if no contraindication).    No acute fracture thoracic and lumbar spine. If pain persists, follow-up   MRI exam recommended (if no contraindication).    --- End of Report ---            SIMRAN HENDERSON MD; Attending Radiologist  This document has been electronically signed. Apr 25 38232:[id number] (04-25-24 @ 20:07)  
Venecia Galindo MD  Division of Hospital Medicine  Available via MS Teams      Patient is a 78y old  Male who presents with a chief complaint of Fall, AHRF (06 May 2024 14:52)        SUBJECTIVE / OVERNIGHT EVENTS:  Overnight, no events - patient voiding, and refusing straight cath  Sitting up by the hallway in the sunlight.  No n/v/f/chills, cp, sob, abd pain.  stating this morning he wants to go home. After discussion with patient that the safest option is [facility], he is now agreeable.      I&O's Summary    06 May 2024 07:01  -  07 May 2024 07:00  --------------------------------------------------------  IN: 420 mL / OUT: 125 mL / NET: 295 mL      Vital Signs Last 24 Hrs  T(C): 36.6 (07 May 2024 11:29), Max: 36.6 (07 May 2024 11:29)  T(F): 97.9 (07 May 2024 11:29), Max: 97.9 (07 May 2024 11:29)  HR: 76 (07 May 2024 11:29) (56 - 76)  BP: 125/78 (07 May 2024 11:29) (123/66 - 131/71)  BP(mean): --  RR: 18 (07 May 2024 11:29) (18 - 18)  SpO2: 95% (07 May 2024 11:29) (94% - 98%)    Parameters below as of 07 May 2024 11:29  Patient On (Oxygen Delivery Method): room air        PHYSICAL EXAM:  GENERAL:  Well appearing, in NAD  HEAD:  NCAT  EYES: conjunctiva clear  NECK: Supple, No JVD  CHEST/LUNG: CTA B/L. No w/r/r.  HEART: Reg rate. Normal S1, S2.   ABDOMEN: SNTND  EXTREMITIES:  2+ Peripheral Pulses, No clubbing, cyanosis, edema.  PSYCH: AAOx3, appropriate affect    LABS:                        CAPILLARY BLOOD GLUCOSE      POCT Blood Glucose.: 114 mg/dL (06 May 2024 16:44)    MEDICATIONS  (STANDING):  finasteride 5 milliGRAM(s) Oral daily  heparin   Injectable 5000 Unit(s) SubCutaneous every 8 hours  lidocaine   4% Patch 1 Patch Transdermal daily  lisinopril 5 milliGRAM(s) Oral daily  melatonin 3 milliGRAM(s) Oral at bedtime  NIFEdipine XL 30 milliGRAM(s) Oral daily  tamsulosin 0.4 milliGRAM(s) Oral at bedtime    MEDICATIONS  (PRN):  acetaminophen     Tablet .. 650 milliGRAM(s) Oral every 6 hours PRN Temp greater or equal to 38C (100.4F), Mild Pain (1 - 3)      
Venecia Galindo MD  Division of Hospital Medicine  Available via MS Teams      Patient is a 78y old  Male who presents with a chief complaint of Fall, AHRF (05 May 2024 13:52)        SUBJECTIVE / OVERNIGHT EVENTS:  Overnight, no events.  No n/v/f/chills, cp, sob, abd pain.  patient feels well, sitting outside by the hallway. wants to go to [facility] rehab      I&O's Summary    05 May 2024 07:01  -  06 May 2024 07:00  --------------------------------------------------------  IN: 900 mL / OUT: 3400 mL / NET: -2500 mL      Vital Signs Last 24 Hrs  T(C): 37.1 (06 May 2024 11:26), Max: 37.1 (06 May 2024 11:26)  T(F): 98.7 (06 May 2024 11:26), Max: 98.7 (06 May 2024 11:26)  HR: 92 (06 May 2024 11:26) (58 - 92)  BP: 119/70 (06 May 2024 11:26) (119/70 - 143/78)  BP(mean): --  RR: 18 (06 May 2024 11:26) (18 - 18)  SpO2: 95% (06 May 2024 11:26) (95% - 98%)    Parameters below as of 06 May 2024 11:26  Patient On (Oxygen Delivery Method): room air        PHYSICAL EXAM:  GENERAL:  Well appearing, in NAD  HEAD:  NCAT  EYES: conjunctiva clear  NECK: Supple, No JVD  CHEST/LUNG: CTA B/L. No w/r/r.  HEART: Reg rate. Normal S1, S2.   ABDOMEN: SNTND  EXTREMITIES:  2+ Peripheral Pulses, No clubbing, cyanosis, edema.  PSYCH: AAOx3, appropriate affect    LABS:            CAPILLARY BLOOD GLUCOSE        MEDICATIONS  (STANDING):  finasteride 5 milliGRAM(s) Oral daily  heparin   Injectable 5000 Unit(s) SubCutaneous every 8 hours  lisinopril 5 milliGRAM(s) Oral daily  melatonin 3 milliGRAM(s) Oral at bedtime  NIFEdipine XL 30 milliGRAM(s) Oral daily    MEDICATIONS  (PRN):  acetaminophen     Tablet .. 650 milliGRAM(s) Oral every 6 hours PRN Temp greater or equal to 38C (100.4F), Mild Pain (1 - 3)      
patient reports rib pain improved     REVIEW OF SYSTEMS  Constitutional - No fever,  No fatigue  HEENT - No vertigo, No neck pain  Neurological - No headaches, No memory loss  Psychiatric - No depression, No anxiety    FUNCTIONAL PROGRESS  PT 5/1  bed mobility CG/min assist  transfers min assist/CG with RW  gait min assist/CG with RW x 50 feet     OT 4/29  transfers min assist with RW  dressing min assist     VITALS  T(C): 36.4 (05-02-24 @ 04:31), Max: 37.1 (05-01-24 @ 11:45)  HR: 72 (05-02-24 @ 04:31) (72 - 88)  BP: 164/83 (05-02-24 @ 04:31) (149/83 - 164/83)  RR: 18 (05-02-24 @ 04:31) (18 - 18)  SpO2: 95% (05-02-24 @ 04:31) (95% - 96%)  Wt(kg): --    MEDICATIONS   acetaminophen     Tablet .. 650 milliGRAM(s) every 6 hours PRN  finasteride 5 milliGRAM(s) daily  heparin   Injectable 5000 Unit(s) every 8 hours  lisinopril 5 milliGRAM(s) daily  melatonin 3 milliGRAM(s) at bedtime  NIFEdipine XL 30 milliGRAM(s) daily  oxyCODONE    IR 2.5 milliGRAM(s) every 6 hours PRN      RECENT LABS - Reviewed                        13.3   10.22 )-----------( 234      ( 01 May 2024 07:06 )             40.0     05-01    141  |  105  |  26<H>  ----------------------------<  107<H>  3.9   |  24  |  0.90    Ca    9.4      01 May 2024 07:02        Urinalysis Basic - ( 01 May 2024 07:02 )    Color: x / Appearance: x / SG: x / pH: x  Gluc: 107 mg/dL / Ketone: x  / Bili: x / Urobili: x   Blood: x / Protein: x / Nitrite: x   Leuk Esterase: x / RBC: x / WBC x   Sq Epi: x / Non Sq Epi: x / Bacteria: x    < from: CT Thoracic Spine Reform w/ IV Cont (04.25.24 @ 20:33) >    IMPRESSION:  No acute intracranial hemorrhage    No acute fracture cervical spine. If pain persists, follow-up MRI exam   recommended (if no contraindication).    No acute fracture thoracic and lumbar spine. If pain persists, follow-up   MRI exam recommended (if no contraindication).      < end of copied text >      ---------------------------------------------------------------------------------  PHYSICAL EXAM  Constitutional - NAD, Comfortable, in chair   Chest - Breathing comfortably  Cardiovascular - S1S2   Abdomen - Soft   Extremities - No C/C/E, No calf tenderness   Neurologic Exam -   follows commands                 Cognitive - Awake, Alert, AAO to self, place, date, year, situation     Communication - Fluent, No dysarthria        Motor - No focal deficits                    LEFT    UE - ShAB 5/5, EF 5/5, EE 5/5, WE 5/5,  5/5                    RIGHT UE - ShAB 5/5, EF 5/5, EE 5/5, WE 5/5,  5/5                    LEFT    LE - HF 5/5, KE 5/5, DF 5/5, PF 5/5                    RIGHT LE - HF 5/5, KE 5/5, DF 5/5, PF 5/5        Sensory - Intact to LT     Psychiatric - Mood stable, Affect WNL  ----------------------------------------------------------------------------------------  ASSESSMENT/PLAN  78yMale h/o HTN, BPD, Prostate cancer with functional deficits after fall, sepsis, Acute hypoxic resp failure  TLSO brace when OOB   +rib fracture, rhabdomyolysis, ARF improved   urinary retention, leger, finasteride   HTN, started on lisinopril, continue to monitor   Pain - Tylenol oxycodone, consider adding lidocaine patch  DVT PPX - SCDs heparin   Rehab - Will continue to follow for ongoing rehab needs and recommendations.    continue bedside therapy, PT/OT    Recommend ACUTE inpatient rehabilitation for the functional deficits consisting of 3 hours of therapy/day  x one week, 24 hour RN/daily PMR physician for comorbid medical management. Patient will be able to tolerate 3 hours a day.               35 minutes spent on total encounter  with chart review of PT/OT notes, exam, imaging, counseling and education on inpatient rehabilitation, coordination of care with rehab team and  
SUBJECTIVE / OVERNIGHT EVENTS:  Today is hospital day 7d. There are no new issues or overnight events.   Did not endorse any headache, lightheadedness, vertigo, shortness of breathe, cough, chest pain, palpitations, tachycardia, abdominal pain, nausea, vomiting, diarrhea or constipation currently    HPI:  78-year-old male with a history of HTN, bipolar disorder, depression, personality disorder with past ECT treatment, prostate ca s/p TURP presents after a fall that occurred 2 days ago while cleaning. Patient denies head strike or LOC but was unable to get up and was found on the ground by his neighbor after he was heard screaming. The neighbor activated EMS and pt was found to be hypoxemic to 80%, started on oxygen.  Patient denies any history of smoking, alcohol use, alcohol withdrawal, recreational or illicit drug use, fevers, chills, chest pain, nausea, vomiting, dysuria, diarrhea. Does not take any medications at home and does not have a PCP, has not seen a doctor in several years. Patient reports he lives alone, does not have a partner or living siblings.     On arrival T 97.1, HR , -173/, RR 20-24, Sat 80%, placed on 6 LNC, now on 2L.   Labs notable for WBC 22.91, neutrophilic predominance 81.8%, Hb 17.8, plt 344. Na 146, BUN/Cr 73/1.52, Ca 10.8. , AST//47, CK 3521. Blood alcohol content <10. UA w 15 ketones, neg nitrites, neg leuk esterase, 0 wbc, neg bacteria, moderate blood, 2 RBCs    CXR: No evidence of acute pulmonary disease or trauma. Xray pelvis: No fracture. No dislocation.  CT Head: No acute intracranial hemorrhage  CTA Chest : Mildly displaced right eighth anterolateral right rib fracture. Marked height loss of the L1 vertebral body of uncertain chronicity but new since the previous exam of 9/25/2014. Correlate clinically for pain. No pulmonary embolism. Hyperdense left renal cyst possibly hemorrhagic cyst but remains indeterminant. Recommend nonemergent renal ultrasound. CT cervical/thoracic/lumbar spine w IV: No acute fracture cervical spine. No acute fracture thoracic and lumbar spine. If pain persists, follow-up MRI exam recommended (if no contraindication).    s/p Ceftriaxone 1 g, 2 L LR bolus, 4 mg IV morphine in the ED   (25 Apr 2024 23:27)    MEDICATIONS  (STANDING):  finasteride 5 milliGRAM(s) Oral daily  heparin   Injectable 5000 Unit(s) SubCutaneous every 8 hours  lisinopril 5 milliGRAM(s) Oral daily  melatonin 3 milliGRAM(s) Oral at bedtime  NIFEdipine XL 30 milliGRAM(s) Oral daily    MEDICATIONS  (PRN):  acetaminophen     Tablet .. 650 milliGRAM(s) Oral every 6 hours PRN Temp greater or equal to 38C (100.4F), Mild Pain (1 - 3)  oxyCODONE    IR 2.5 milliGRAM(s) Oral every 6 hours PRN Moderate Pain (4 - 6)    HOME MEDICATIONS:  finasteride 5 mg oral tablet: 1 tab(s) orally once a day  TLSO Brace: Tlso    PHYSICAL EXAM  Vital Signs Last 24 Hrs  T(C): 36.2 (02 May 2024 11:30), Max: 36.6 (01 May 2024 20:04)  T(F): 97.2 (02 May 2024 11:30), Max: 97.8 (01 May 2024 20:04)  HR: 71 (02 May 2024 11:30) (71 - 82)  BP: 136/82 (02 May 2024 11:30) (136/82 - 164/83)  BP(mean): --  RR: 18 (02 May 2024 11:30) (18 - 18)  SpO2: 95% (02 May 2024 11:30) (95% - 96%)    Parameters below as of 02 May 2024 11:30  Patient On (Oxygen Delivery Method): room air        05-01-24 @ 07:01  -  05-02-24 @ 07:00  --------------------------------------------------------  IN: 625 mL / OUT: 1050 mL / NET: -425 mL    05-02-24 @ 07:01  -  05-02-24 @ 14:41  --------------------------------------------------------  IN: 1200 mL / OUT: 300 mL / NET: 900 mL      CONSTITUTIONAL: Well-groomed, in no apparent distress;  EYES: No conjunctival or scleral injection, non-icteric;  ENMT: No external nasal lesions; Normal outer ears;  NECK: Trachea midline;  RESPIRATORY: Normal respiratory effort; Decreased breathe sounds bilaterally without wheeze/rhonchi/rales;  CARDIOVASCULAR: Regular rate and rhythm;  GASTROINTESTINAL: Non-distended; No palpable masses; No rebound/guarding;  EXTREMITIES:  No lower extremity edema;  NEUROLOGY: A+O to person, place, and time; Does respond to commands appropriately;  PSYCHIATRY: Mood and Affect appropriate    LABS:                        13.3   10.22 )-----------( 234      ( 01 May 2024 07:06 )             40.0     05-01    141  |  105  |  26<H>  ----------------------------<  107<H>  3.9   |  24  |  0.90    Ca    9.4      01 May 2024 07:02        CARDIAC MARKERS ( 01 May 2024 07:02 )  x     / x     / 94 U/L / x     / x          Urinalysis Basic - ( 01 May 2024 07:02 )    Color: x / Appearance: x / SG: x / pH: x  Gluc: 107 mg/dL / Ketone: x  / Bili: x / Urobili: x   Blood: x / Protein: x / Nitrite: x   Leuk Esterase: x / RBC: x / WBC x   Sq Epi: x / Non Sq Epi: x / Bacteria: x            RADIOLOGY & ADDITIONAL TESTS:  EKG  12 Lead ECG:   Ventricular Rate 79 BPM    Atrial Rate 79 BPM    P-R Interval 144 ms    QRS Duration 102 ms    Q-T Interval 388 ms    QTC Calculation(Bazett) 444 ms    P Axis 26 degrees    R Axis -13 degrees    T Axis 47 degrees    Diagnosis Line NORMAL SINUS RHYTHM  NORMAL ECG  WHEN COMPARED WITH ECG OF 25-APR-2024 17:26, (UNCONFIRMED)  NO SIGNIFICANT CHANGE WAS FOUND    Confirmed by MD Patton Ronald ([id number]) on 4/29/2024 9:33:27 AM (04-26-24 @ 04:22)  12 Lead ECG:   Ventricular Rate 106 BPM    Atrial Rate 106 BPM    P-R Interval 134 ms    QRS Duration 92 ms    Q-T Interval 336 ms    QTC Calculation(Bazett) 446 ms    P Axis 28 degrees    R Axis -27 degrees    T Axis 46 degrees    Diagnosis Line BASELINE ARTIFACT  SINUS TACHYCARDIA  PLEASE REPEAT    Confirmed by MD Patton Ronald [id number]) on 4/29/2024 11:47:43 AM (04-25-24 @ 17:26)    CT Angio Chest PE Protocol w/ IV Cont:   ACC: [id number] EXAM:  CT ANGIO CHEST PULM ART WAWIC   ORDERED BY: ALEXANDRA DUTTON     ACC: [id number] EXAM:  CT ABDOMEN AND PELVIS IC   ORDERED BY: ALEXANDRA DUTTON     PROCEDURE DATE:  04/25/2024          INTERPRETATION:  CLINICAL INFORMATION:Trauma code    COMPARISON: CT abdomen pelvis 9/25/2014.    CONTRAST/COMPLICATIONS:  IV Contrast: IV contrast documented in unlinked concurrent exam   (accession [id number]), Omnipaque 350 (accession [id number])  90 cc   administered   10 cc discarded  Oral Contrast: NONE  Complications: None reported at time of study completion    PROCEDURE:  CT of the Chest, Abdomen and Pelvis was performed.  Imaging was performed through the chest in the arterial phase followed by   imaging of the abdomen and pelvis in the portal venous phase.  Sagittal and coronal reformats were performed.    FINDINGS:  CHEST:  LUNGS AND LARGE AIRWAYS: Patent central airways. Mild bibasilar   atelectasis right greater than left. Numerous bilateral subcentimeter   calcified granulomas.  PLEURA: No pleural effusion.  VESSELS: No pulmonary embolism to level of the subsegmental arteries.   Coronary artery calcifications..  HEART: Heart size is normal. No pericardial effusion.  MEDIASTINUM AND MELVIN: No lymphadenopathy.  CHEST WALL AND LOWER NECK: Within normal limits.    ABDOMEN AND PELVIS:  LIVER: Subcentimeter hypodensity in the periphery of the right lobe of   liver.  BILE DUCTS: Normal caliber.  GALLBLADDER: Cholelithiasis.  SPLEEN: Within normal limits.  PANCREAS: Pancreatic calcifications..  ADRENALS: Within normal limits.  KIDNEYS/URETERS: No hydronephrosis. Nonobstructive 0.3, 0.3, and 0.5 cm   left renal calculi. Nonobstructive 0.3 right renal calculi. Hyperdense   exophytic left upper pole renal nodule measuring 2.7 x 2.5 cm. This may   represent a hemorrhagic cyst but remains indeterminant. Recommend   nonemergent renal ultrasound. Right renal cysts and subcentimeter   hypodensities, too small to stress.    BLADDER: Within normal limits.  REPRODUCTIVE ORGANS: Prostate is enlarged. Penile pump and reservoir.    BOWEL: No bowel obstruction. Appendix is normal.  PERITONEUM: No ascites.  VESSELS: Atherosclerotic changes.  RETROPERITONEUM/LYMPH NODES: No lymphadenopathy.  ABDOMINAL WALL: Bowel-containing left inguinal hernia. Left gluteal   lipoma. Small fat-containing right inguinal hernia.  BONES: Degenerative changes. Lumbar levoscoliosis.  Mildly displaced right eighth anterolateral right rib fracture.  Marked height loss of the L1 vertebral body of uncertain chronicity but   new since the previous exam of 9/25/2014. Correlate clinically for pain.    IMPRESSION:    Mildly displaced right eighth anterolateral right rib fracture. Marked   height loss of the L1 vertebral body of uncertain chronicity but new   since the previous exam of 9/25/2014. Correlate clinically for pain.    No pulmonary embolism.    Hyperdense left renal cyst possibly hemorrhagic cyst but remains   indeterminant. Recommend nonemergent renal ultrasound.        --- End of Report ---           MYRIAM FLOWERS MD; Resident Radiologist  This document has been electronically signed.  GEOVANNA BLANCA MD; Attending Radiologist  This document has been electronically signed. Apr 25 2024  8:58PM (04-25-24 @ 20:19)  CT Head No Cont:   ACC: [id number] EXAM:  CT REFORM SPINE T  IC   ORDERED BY: ALEXANDRA DUTTON     ACC: [id number] EXAM:  CT REFORM SPINE L IC   ORDERED BY: ALEXANDRA DUTTON     ACC: [id number] EXAM:  CT BRAIN   ORDERED BY: ALEXANDRA DUTTON     ACC: [id number] EXAM:  CTCERVICAL SPINE   ORDERED BY: ALEXANDRA DUTTON     PROCEDURE DATE:  04/25/2024          INTERPRETATION:  CLINICAL STATEMENT: Trauma..    TECHNIQUE: CT of the head and cervical spine were performed without IV   contrast. 3-D/MIP images obtained    COMPARISON: CT head 2/1/2010    FINDINGS:  Head:  There is mild diffuse parenchymal volume loss.    There are areas of low attenuation in the periventricular white matter   likely related to mild chronic microvascular ischemic changes.    There is no acute intracranial hemorrhage, parenchymal mass, mass effect   or midline shift. There is no acute territorial infarct. There is no   hydrocephalus. Partial empty sella noted    The cranium is intact.    Retention cyst/polyps and nasal sinuses.    Cervical spine:  The vertebral body heights are intact. Grade 1 anterolisthesis C2 on C3,   C7 on T1    Nonspecific small sclerotic lesion C3 left pedicle noted.    Evaluation of the spinal canal is limited on a CT exam.  Multilevel   degenerative changes noted resulting in multilevel spinal canal stenosis   and neural foraminal narrowing.    Thoracic and lumbar spine reformats:  The thoracic vertebral body heights and alignment are maintained.    Moderate compression fracture L1 vertebral body noted appears chronic.   Mild posterior retropulsion results in mild spinal canal stenosis.   Remaining lumbar vertebral body heights intact. Scoliosis noted.    Evaluation of the spinal canal is limited on a CT exam.  Multilevel   degenerative changes noted resulting in multilevel spinal canal stenosis   and neural foraminal narrowing.      IMPRESSION:  No acute intracranial hemorrhage    No acute fracture cervical spine. If pain persists, follow-up MRI exam   recommended (if no contraindication).    No acute fracture thoracic and lumbar spine. If pain persists, follow-up   MRI exam recommended (if no contraindication).    --- End of Report ---            SIMRAN HENDERSON MD; Attending Radiologist  This document has been electronically signed. Apr 25 01602:49PM (04-25-24 @ 20:07)  
SUBJECTIVE / OVERNIGHT EVENTS:  Today is hospital day 4d. There are no new issues or overnight events.   Did not endorse any headache, lightheadedness, vertigo, shortness of breathe, cough, chest pain, palpitations, tachycardia, abdominal pain, nausea, vomiting, diarrhea or constipation currently    HPI:  78-year-old male with a history of HTN, bipolar disorder, depression, personality disorder with past ECT treatment, prostate ca s/p TURP presents after a fall that occurred 2 days ago while cleaning. Patient denies head strike or LOC but was unable to get up and was found on the ground by his neighbor after he was heard screaming. The neighbor activated EMS and pt was found to be hypoxemic to 80%, started on oxygen.  Patient denies any history of smoking, alcohol use, alcohol withdrawal, recreational or illicit drug use, fevers, chills, chest pain, nausea, vomiting, dysuria, diarrhea. Does not take any medications at home and does not have a PCP, has not seen a doctor in several years. Patient reports he lives alone, does not have a partner or living siblings.     On arrival T 97.1, HR , -173/, RR 20-24, Sat 80%, placed on 6 LNC, now on 2L.   Labs notable for WBC 22.91, neutrophilic predominance 81.8%, Hb 17.8, plt 344. Na 146, BUN/Cr 73/1.52, Ca 10.8. , AST//47, CK 3521. Blood alcohol content <10. UA w 15 ketones, neg nitrites, neg leuk esterase, 0 wbc, neg bacteria, moderate blood, 2 RBCs    CXR: No evidence of acute pulmonary disease or trauma. Xray pelvis: No fracture. No dislocation.  CT Head: No acute intracranial hemorrhage  CTA Chest : Mildly displaced right eighth anterolateral right rib fracture. Marked height loss of the L1 vertebral body of uncertain chronicity but new since the previous exam of 9/25/2014. Correlate clinically for pain. No pulmonary embolism. Hyperdense left renal cyst possibly hemorrhagic cyst but remains indeterminant. Recommend nonemergent renal ultrasound. CT cervical/thoracic/lumbar spine w IV: No acute fracture cervical spine. No acute fracture thoracic and lumbar spine. If pain persists, follow-up MRI exam recommended (if no contraindication).    s/p Ceftriaxone 1 g, 2 L LR bolus, 4 mg IV morphine in the ED   (25 Apr 2024 23:27)    MEDICATIONS  (STANDING):  finasteride 5 milliGRAM(s) Oral daily  heparin   Injectable 5000 Unit(s) SubCutaneous every 8 hours  sodium chloride 0.9%. 1000 milliLiter(s) (100 mL/Hr) IV Continuous <Continuous>    MEDICATIONS  (PRN):  acetaminophen     Tablet .. 650 milliGRAM(s) Oral every 6 hours PRN Temp greater or equal to 38C (100.4F), Mild Pain (1 - 3)  oxyCODONE    IR 2.5 milliGRAM(s) Oral every 6 hours PRN Moderate Pain (4 - 6)    HOME MEDICATIONS:  finasteride 5 mg oral tablet: 1 tab(s) orally once a day  TLSO Brace: Tlso    PHYSICAL EXAM  Vital Signs Last 24 Hrs  T(C): 36.4 (29 Apr 2024 04:20), Max: 36.6 (28 Apr 2024 11:34)  T(F): 97.6 (29 Apr 2024 04:20), Max: 97.8 (28 Apr 2024 11:34)  HR: 70 (29 Apr 2024 04:20) (70 - 93)  BP: 151/81 (29 Apr 2024 04:20) (133/91 - 158/72)  BP(mean): --  RR: 18 (29 Apr 2024 04:20) (18 - 18)  SpO2: 94% (29 Apr 2024 04:20) (94% - 97%)    Parameters below as of 29 Apr 2024 04:20  Patient On (Oxygen Delivery Method): nasal cannula  O2 Flow (L/min): 0.5      04-28-24 @ 07:01  -  04-29-24 @ 07:00  --------------------------------------------------------  IN: 480 mL / OUT: 600 mL / NET: -120 mL      CONSTITUTIONAL: Well-groomed, in no apparent distress;  EYES: No conjunctival or scleral injection, non-icteric;  ENMT: No external nasal lesions; Normal outer ears;  NECK: Trachea midline;  RESPIRATORY: Normal respiratory effort; Decreased breathe sounds bilaterally without wheeze/rhonchi/rales;  CARDIOVASCULAR: Regular rate and rhythm;  GASTROINTESTINAL: Non-distended; No palpable masses; No rebound/guarding;  EXTREMITIES:  No lower extremity edema;  NEUROLOGY: A+O to person, place, and time; Does respond to commands appropriately;  PSYCHIATRY: Mood and Affect appropriate    LABS:                        13.6   10.70 )-----------( 201      ( 28 Apr 2024 05:45 )             40.9     04-28    142  |  108  |  33<H>  ----------------------------<  118<H>  3.6   |  25  |  0.93    Ca    9.4      28 Apr 2024 05:45    TPro  6.5  /  Alb  3.5  /  TBili  0.6  /  DBili  x   /  AST  52<H>  /  ALT  70<H>  /  AlkPhos  72  04-28      CARDIAC MARKERS ( 28 Apr 2024 05:45 )  x     / x     / 501 U/L / x     / x          Urinalysis Basic - ( 28 Apr 2024 05:45 )    Color: x / Appearance: x / SG: x / pH: x  Gluc: 118 mg/dL / Ketone: x  / Bili: x / Urobili: x   Blood: x / Protein: x / Nitrite: x   Leuk Esterase: x / RBC: x / WBC x   Sq Epi: x / Non Sq Epi: x / Bacteria: x            RADIOLOGY & ADDITIONAL TESTS:  EKG  12 Lead ECG:   Ventricular Rate 91 BPM    Atrial Rate 91 BPM    P-R Interval 148 ms    QRS Duration 104 ms    Q-T Interval 348 ms    QTC Calculation(Bazett) 428 ms    P Axis 40 degrees    R Axis 24 degrees    T Axis 62 degrees    Diagnosis Line NORMAL SINUS RHYTHM  NONSPECIFIC ST ABNORMALITY  ABNORMAL ECG  WHEN COMPARED WITH ECG OF 25-AUG-2018 13:17,  SIGNIFICANT CHANGES HAVE OCCURRED  new NSST changes  Confirmed by MICA Estrada Zlata ([id number]) on 2/28/2022 10:41:25 AM (02-27-22 @ 19:39)    CT Angio Chest PE Protocol w/ IV Cont:   ACC: [id number] EXAM:  CT ANGIO CHEST PULM ART WAWIC   ORDERED BY: ALEXANDRA DUTTON     ACC: [id number] EXAM:  CT ABDOMEN AND PELVIS IC   ORDERED BY: ALEXANDRA   DUTTON     PROCEDURE DATE:  04/25/2024          INTERPRETATION:  CLINICAL INFORMATION:Trauma code    COMPARISON: CT abdomen pelvis 9/25/2014.    CONTRAST/COMPLICATIONS:  IV Contrast: IV contrast documented in unlinked concurrent exam   (accession [id number]), Omnipaque 350 (accession [id number])  90 cc   administered   10 cc discarded  Oral Contrast: NONE  Complications: None reported at time of study completion    PROCEDURE:  CT of the Chest, Abdomen and Pelvis was performed.  Imaging was performed through the chest in the arterial phase followed by   imaging of the abdomen and pelvis in the portal venous phase.  Sagittal and coronal reformats were performed.    FINDINGS:  CHEST:  LUNGS AND LARGE AIRWAYS: Patent central airways. Mild bibasilar   atelectasis right greater than left. Numerous bilateral subcentimeter   calcified granulomas.  PLEURA: No pleural effusion.  VESSELS: No pulmonary embolism to level of the subsegmental arteries.   Coronary artery calcifications..  HEART: Heart size is normal. No pericardial effusion.  MEDIASTINUM AND MELVIN: No lymphadenopathy.  CHEST WALL AND LOWER NECK: Within normal limits.    ABDOMEN AND PELVIS:  LIVER: Subcentimeter hypodensity in the periphery of the right lobe of   liver.  BILE DUCTS: Normal caliber.  GALLBLADDER: Cholelithiasis.  SPLEEN: Within normal limits.  PANCREAS: Pancreatic calcifications..  ADRENALS: Within normal limits.  KIDNEYS/URETERS: No hydronephrosis. Nonobstructive 0.3, 0.3, and 0.5 cm   left renal calculi. Nonobstructive 0.3 right renal calculi. Hyperdense   exophytic left upper pole renal nodule measuring 2.7 x 2.5 cm. This may   represent a hemorrhagic cyst but remains indeterminant. Recommend   nonemergent renal ultrasound. Right renal cysts and subcentimeter   hypodensities, too small to stress.    BLADDER: Within normal limits.  REPRODUCTIVE ORGANS: Prostate is enlarged. Penile pump and reservoir.    BOWEL: No bowel obstruction. Appendix is normal.  PERITONEUM: No ascites.  VESSELS: Atherosclerotic changes.  RETROPERITONEUM/LYMPH NODES: No lymphadenopathy.  ABDOMINAL WALL: Bowel-containing left inguinal hernia. Left gluteal   lipoma. Small fat-containing right inguinal hernia.  BONES: Degenerative changes. Lumbar levoscoliosis.  Mildly displaced right eighth anterolateral right rib fracture.  Marked height loss of the L1 vertebral body of uncertain chronicity but   new since the previous exam of 9/25/2014. Correlate clinically for pain.    IMPRESSION:    Mildly displaced right eighth anterolateral right rib fracture. Marked   height loss of the L1 vertebral body of uncertain chronicity but new   since the previous exam of 9/25/2014. Correlate clinically for pain.    No pulmonary embolism.    Hyperdense left renal cyst possibly hemorrhagic cyst but remains   indeterminant. Recommend nonemergent renal ultrasound.        --- End of Report ---           MYRIAM FLOWERS MD; Resident Radiologist  This document has been electronically signed.  GEOVANNA LBANCA MD; Attending Radiologist  This document has been electronically signed. Apr 25 2024  8:58PM (04-25-24 @ 20:19)  CT Head No Cont:   ACC: [id number] EXAM:  CT REFORM SPINE T  IC   ORDERED BY: ALEXANDRA DUTTON     ACC: [id number] EXAM:  CT REFORM SPINE L IC   ORDERED BY: ALEXANDRA DUTTON     ACC: [id number] EXAM:  CT BRAIN   ORDERED BY: ALEXANDRA DUTTON     ACC: [id number] EXAM:  CTCERVICAL SPINE   ORDERED BY: ALEXANDRA DUTTON     PROCEDURE DATE:  04/25/2024          INTERPRETATION:  CLINICAL STATEMENT: Trauma..    TECHNIQUE: CT of the head and cervical spine were performed without IV   contrast. 3-D/MIP images obtained    COMPARISON: CT head 2/1/2010    FINDINGS:  Head:  There is mild diffuse parenchymal volume loss.    There are areas of low attenuation in the periventricular white matter   likely related to mild chronic microvascular ischemic changes.    There is no acute intracranial hemorrhage, parenchymal mass, mass effect   or midline shift. There is no acute territorial infarct. There is no   hydrocephalus. Partial empty sella noted    The cranium is intact.    Retention cyst/polyps and nasal sinuses.    Cervical spine:  The vertebral body heights are intact. Grade 1 anterolisthesis C2 on C3,   C7 on T1    Nonspecific small sclerotic lesion C3 left pedicle noted.    Evaluation of the spinal canal is limited on a CT exam.  Multilevel   degenerative changes noted resulting in multilevel spinal canal stenosis   and neural foraminal narrowing.    Thoracic and lumbar spine reformats:  The thoracic vertebral body heights and alignment are maintained.    Moderate compression fracture L1 vertebral body noted appears chronic.   Mild posterior retropulsion results in mild spinal canal stenosis.   Remaining lumbar vertebral body heights intact. Scoliosis noted.    Evaluation of the spinal canal is limited on a CT exam.  Multilevel   degenerative changes noted resulting in multilevel spinal canal stenosis   and neural foraminal narrowing.      IMPRESSION:  No acute intracranial hemorrhage    No acute fracture cervical spine. If pain persists, follow-up MRI exam   recommended (if no contraindication).    No acute fracture thoracic and lumbar spine. If pain persists, follow-up   MRI exam recommended (if no contraindication).    --- End of Report ---            SIMRAN HENDERSON MD; Attending Radiologist  This document has been electronically signed. Apr 25 34371:[id number] (04-25-24 @ 20:07)  
SUBJECTIVE / OVERNIGHT EVENTS:  Today is hospital day 5d. There are no new issues or overnight events.   Did not endorse any headache, lightheadedness, vertigo, shortness of breathe, cough, chest pain, palpitations, tachycardia, abdominal pain, nausea, vomiting, diarrhea or constipation currently    HPI:  78-year-old male with a history of HTN, bipolar disorder, depression, personality disorder with past ECT treatment, prostate ca s/p TURP presents after a fall that occurred 2 days ago while cleaning. Patient denies head strike or LOC but was unable to get up and was found on the ground by his neighbor after he was heard screaming. The neighbor activated EMS and pt was found to be hypoxemic to 80%, started on oxygen.  Patient denies any history of smoking, alcohol use, alcohol withdrawal, recreational or illicit drug use, fevers, chills, chest pain, nausea, vomiting, dysuria, diarrhea. Does not take any medications at home and does not have a PCP, has not seen a doctor in several years. Patient reports he lives alone, does not have a partner or living siblings.     On arrival T 97.1, HR , -173/, RR 20-24, Sat 80%, placed on 6 LNC, now on 2L.   Labs notable for WBC 22.91, neutrophilic predominance 81.8%, Hb 17.8, plt 344. Na 146, BUN/Cr 73/1.52, Ca 10.8. , AST//47, CK 3521. Blood alcohol content <10. UA w 15 ketones, neg nitrites, neg leuk esterase, 0 wbc, neg bacteria, moderate blood, 2 RBCs    CXR: No evidence of acute pulmonary disease or trauma. Xray pelvis: No fracture. No dislocation.  CT Head: No acute intracranial hemorrhage  CTA Chest : Mildly displaced right eighth anterolateral right rib fracture. Marked height loss of the L1 vertebral body of uncertain chronicity but new since the previous exam of 9/25/2014. Correlate clinically for pain. No pulmonary embolism. Hyperdense left renal cyst possibly hemorrhagic cyst but remains indeterminant. Recommend nonemergent renal ultrasound. CT cervical/thoracic/lumbar spine w IV: No acute fracture cervical spine. No acute fracture thoracic and lumbar spine. If pain persists, follow-up MRI exam recommended (if no contraindication).    s/p Ceftriaxone 1 g, 2 L LR bolus, 4 mg IV morphine in the ED   (25 Apr 2024 23:27)    MEDICATIONS  (STANDING):  finasteride 5 milliGRAM(s) Oral daily  heparin   Injectable 5000 Unit(s) SubCutaneous every 8 hours  sodium chloride 0.9%. 1000 milliLiter(s) (100 mL/Hr) IV Continuous <Continuous>    MEDICATIONS  (PRN):  acetaminophen     Tablet .. 650 milliGRAM(s) Oral every 6 hours PRN Temp greater or equal to 38C (100.4F), Mild Pain (1 - 3)  oxyCODONE    IR 2.5 milliGRAM(s) Oral every 6 hours PRN Moderate Pain (4 - 6)    HOME MEDICATIONS:  finasteride 5 mg oral tablet: 1 tab(s) orally once a day  TLSO Brace: Tlso    PHYSICAL EXAM  Vital Signs Last 24 Hrs  T(C): 36.6 (30 Apr 2024 11:31), Max: 36.7 (30 Apr 2024 06:17)  T(F): 97.9 (30 Apr 2024 11:31), Max: 98 (30 Apr 2024 06:17)  HR: 78 (30 Apr 2024 11:31) (64 - 89)  BP: 180/95 (30 Apr 2024 11:31) (154/89 - 180/95)  BP(mean): --  RR: 18 (30 Apr 2024 11:31) (18 - 18)  SpO2: 97% (30 Apr 2024 11:31) (97% - 98%)    Parameters below as of 30 Apr 2024 11:31  Patient On (Oxygen Delivery Method): room air        04-29-24 @ 07:01  -  04-30-24 @ 07:00  --------------------------------------------------------  IN: 480 mL / OUT: 250 mL / NET: 230 mL      CONSTITUTIONAL: Well-groomed, in no apparent distress;  EYES: No conjunctival or scleral injection, non-icteric;  ENMT: No external nasal lesions; Normal outer ears;  NECK: Trachea midline;  RESPIRATORY: Normal respiratory effort; Decreased breathe sounds bilaterally without wheeze/rhonchi/rales;  CARDIOVASCULAR: Regular rate and rhythm;  GASTROINTESTINAL: Non-distended; No palpable masses; No rebound/guarding;  EXTREMITIES:  No lower extremity edema;  NEUROLOGY: A+O to person, place, and time; Does respond to commands appropriately;  PSYCHIATRY: Mood and Affect appropriate    LABS:                        14.0   11.05 )-----------( 216      ( 30 Apr 2024 07:10 )             41.4     04-30    139  |  105  |  25<H>  ----------------------------<  116<H>  3.9   |  23  |  0.82    Ca    9.4      30 Apr 2024 07:12    TPro  6.6  /  Alb  3.7  /  TBili  0.6  /  DBili  x   /  AST  60<H>  /  ALT  80<H>  /  AlkPhos  72  04-30          Urinalysis Basic - ( 30 Apr 2024 07:12 )    Color: x / Appearance: x / SG: x / pH: x  Gluc: 116 mg/dL / Ketone: x  / Bili: x / Urobili: x   Blood: x / Protein: x / Nitrite: x   Leuk Esterase: x / RBC: x / WBC x   Sq Epi: x / Non Sq Epi: x / Bacteria: x            RADIOLOGY & ADDITIONAL TESTS:  EKG  12 Lead ECG:   Ventricular Rate 79 BPM    Atrial Rate 79 BPM    P-R Interval 144 ms    QRS Duration 102 ms    Q-T Interval 388 ms    QTC Calculation(Bazett) 444 ms    P Axis 26 degrees    R Axis -13 degrees    T Axis 47 degrees    Diagnosis Line NORMAL SINUS RHYTHM  NORMAL ECG  WHEN COMPARED WITH ECG OF 25-APR-2024 17:26, (UNCONFIRMED)  NO SIGNIFICANT CHANGE WAS FOUND    Confirmed by MD Patton Ronald ([id number]) on 4/29/2024 9:33:27 AM (04-26-24 @ 04:22)  12 Lead ECG:   Ventricular Rate 106 BPM    Atrial Rate 106 BPM    P-R Interval 134 ms    QRS Duration 92 ms    Q-T Interval 336 ms    QTC Calculation(Bazett) 446 ms    P Axis 28 degrees    R Axis -27 degrees    T Axis 46 degrees    Diagnosis Line BASELINE ARTIFACT  SINUS TACHYCARDIA  PLEASE REPEAT    Confirmed by MD Patton Ronald ([id number]) on 4/29/2024 11:47:43 AM (04-25-24 @ 17:26)    CT Angio Chest PE Protocol w/ IV Cont:   ACC: [id number] EXAM:  CT ANGIO CHEST PULM ART WAWIC   ORDERED BY: ALEXANDRA DUTTON     ACC: [id number] EXAM:  CT ABDOMEN AND PELVIS IC   ORDERED BY: ALEXANDRA DUTTON     PROCEDURE DATE:  04/25/2024          INTERPRETATION:  CLINICAL INFORMATION:Trauma code    COMPARISON: CT abdomen pelvis 9/25/2014.    CONTRAST/COMPLICATIONS:  IV Contrast: IV contrast documented in unlinked concurrent exam   (accession [id number]), Omnipaque 350 (accession [id number])  90 cc   administered   10 cc discarded  Oral Contrast: NONE  Complications: None reported at time of study completion    PROCEDURE:  CT of the Chest, Abdomen and Pelvis was performed.  Imaging was performed through the chest in the arterial phase followed by   imaging of the abdomen and pelvis in the portal venous phase.  Sagittal and coronal reformats were performed.    FINDINGS:  CHEST:  LUNGS AND LARGE AIRWAYS: Patent central airways. Mild bibasilar   atelectasis right greater than left. Numerous bilateral subcentimeter   calcified granulomas.  PLEURA: No pleural effusion.  VESSELS: No pulmonary embolism to level of the subsegmental arteries.   Coronary artery calcifications..  HEART: Heart size is normal. No pericardial effusion.  MEDIASTINUM AND MELVIN: No lymphadenopathy.  CHEST WALL AND LOWER NECK: Within normal limits.    ABDOMEN AND PELVIS:  LIVER: Subcentimeter hypodensity in the periphery of the right lobe of   liver.  BILE DUCTS: Normal caliber.  GALLBLADDER: Cholelithiasis.  SPLEEN: Within normal limits.  PANCREAS: Pancreatic calcifications..  ADRENALS: Within normal limits.  KIDNEYS/URETERS: No hydronephrosis. Nonobstructive 0.3, 0.3, and 0.5 cm   left renal calculi. Nonobstructive 0.3 right renal calculi. Hyperdense   exophytic left upper pole renal nodule measuring 2.7 x 2.5 cm. This may   represent a hemorrhagic cyst but remains indeterminant. Recommend   nonemergent renal ultrasound. Right renal cysts and subcentimeter   hypodensities, too small to stress.    BLADDER: Within normal limits.  REPRODUCTIVE ORGANS: Prostate is enlarged. Penile pump and reservoir.    BOWEL: No bowel obstruction. Appendix is normal.  PERITONEUM: No ascites.  VESSELS: Atherosclerotic changes.  RETROPERITONEUM/LYMPH NODES: No lymphadenopathy.  ABDOMINAL WALL: Bowel-containing left inguinal hernia. Left gluteal   lipoma. Small fat-containing right inguinal hernia.  BONES: Degenerative changes. Lumbar levoscoliosis.  Mildly displaced right eighth anterolateral right rib fracture.  Marked height loss of the L1 vertebral body of uncertain chronicity but   new since the previous exam of 9/25/2014. Correlate clinically for pain.    IMPRESSION:    Mildly displaced right eighth anterolateral right rib fracture. Marked   height loss of the L1 vertebral body of uncertain chronicity but new   since the previous exam of 9/25/2014. Correlate clinically for pain.    No pulmonary embolism.    Hyperdense left renal cyst possibly hemorrhagic cyst but remains   indeterminant. Recommend nonemergent renal ultrasound.        --- End of Report ---           MYRIAM FLOWERS MD; Resident Radiologist  This document has been electronically signed.  GEOVANNA BLANCA MD; Attending Radiologist  This document has been electronically signed. Apr 25 2024  8:58PM (04-25-24 @ 20:19)  CT Head No Cont:   ACC: [id number] EXAM:  CT REFORM SPINE T  IC   ORDERED BY: ALEXANDRA DUTTON     ACC: [id number] EXAM:  CT REFORM SPINE L IC   ORDERED BY: ALEXANDRA DUTTON     ACC: [id number] EXAM:  CT BRAIN   ORDERED BY: ALEXANDRA DUTTON     ACC: [id number] EXAM:  CTCERVICAL SPINE   ORDERED BY: ALEXANDRA DUTTON     PROCEDURE DATE:  04/25/2024          INTERPRETATION:  CLINICAL STATEMENT: Trauma..    TECHNIQUE: CT of the head and cervical spine were performed without IV   contrast. 3-D/MIP images obtained    COMPARISON: CT head 2/1/2010    FINDINGS:  Head:  There is mild diffuse parenchymal volume loss.    There are areas of low attenuation in the periventricular white matter   likely related to mild chronic microvascular ischemic changes.    There is no acute intracranial hemorrhage, parenchymal mass, mass effect   or midline shift. There is no acute territorial infarct. There is no   hydrocephalus. Partial empty sella noted    The cranium is intact.    Retention cyst/polyps and nasal sinuses.    Cervical spine:  The vertebral body heights are intact. Grade 1 anterolisthesis C2 on C3,   C7 on T1    Nonspecific small sclerotic lesion C3 left pedicle noted.    Evaluation of the spinal canal is limited on a CT exam.  Multilevel   degenerative changes noted resulting in multilevel spinal canal stenosis   and neural foraminal narrowing.    Thoracic and lumbar spine reformats:  The thoracic vertebral body heights and alignment are maintained.    Moderate compression fracture L1 vertebral body noted appears chronic.   Mild posterior retropulsion results in mild spinal canal stenosis.   Remaining lumbar vertebral body heights intact. Scoliosis noted.    Evaluation of the spinal canal is limited on a CT exam.  Multilevel   degenerative changes noted resulting in multilevel spinal canal stenosis   and neural foraminal narrowing.      IMPRESSION:  No acute intracranial hemorrhage    No acute fracture cervical spine. If pain persists, follow-up MRI exam   recommended (if no contraindication).    No acute fracture thoracic and lumbar spine. If pain persists, follow-up   MRI exam recommended (if no contraindication).    --- End of Report ---            SIMRAN HENDERSON MD; Attending Radiologist  This document has been electronically signed. Apr 25 05307:49PM (04-25-24 @ 20:07)  
SUBJECTIVE / OVERNIGHT EVENTS:  Today is hospital day 9d. There are no new issues or overnight events.   Did not endorse any headache, lightheadedness, vertigo, shortness of breathe, cough, chest pain, palpitations, tachycardia, abdominal pain, nausea, vomiting, diarrhea or constipation currently    HPI:  78-year-old male with a history of HTN, bipolar disorder, depression, personality disorder with past ECT treatment, prostate ca s/p TURP presents after a fall that occurred 2 days ago while cleaning. Patient denies head strike or LOC but was unable to get up and was found on the ground by his neighbor after he was heard screaming. The neighbor activated EMS and pt was found to be hypoxemic to 80%, started on oxygen.  Patient denies any history of smoking, alcohol use, alcohol withdrawal, recreational or illicit drug use, fevers, chills, chest pain, nausea, vomiting, dysuria, diarrhea. Does not take any medications at home and does not have a PCP, has not seen a doctor in several years. Patient reports he lives alone, does not have a partner or living siblings.     On arrival T 97.1, HR , -173/, RR 20-24, Sat 80%, placed on 6 LNC, now on 2L.   Labs notable for WBC 22.91, neutrophilic predominance 81.8%, Hb 17.8, plt 344. Na 146, BUN/Cr 73/1.52, Ca 10.8. , AST//47, CK 3521. Blood alcohol content <10. UA w 15 ketones, neg nitrites, neg leuk esterase, 0 wbc, neg bacteria, moderate blood, 2 RBCs    CXR: No evidence of acute pulmonary disease or trauma. Xray pelvis: No fracture. No dislocation.  CT Head: No acute intracranial hemorrhage  CTA Chest : Mildly displaced right eighth anterolateral right rib fracture. Marked height loss of the L1 vertebral body of uncertain chronicity but new since the previous exam of 9/25/2014. Correlate clinically for pain. No pulmonary embolism. Hyperdense left renal cyst possibly hemorrhagic cyst but remains indeterminant. Recommend nonemergent renal ultrasound. CT cervical/thoracic/lumbar spine w IV: No acute fracture cervical spine. No acute fracture thoracic and lumbar spine. If pain persists, follow-up MRI exam recommended (if no contraindication).    s/p Ceftriaxone 1 g, 2 L LR bolus, 4 mg IV morphine in the ED   (25 Apr 2024 23:27)    MEDICATIONS  (STANDING):  finasteride 5 milliGRAM(s) Oral daily  heparin   Injectable 5000 Unit(s) SubCutaneous every 8 hours  lisinopril 5 milliGRAM(s) Oral daily  melatonin 3 milliGRAM(s) Oral at bedtime  NIFEdipine XL 30 milliGRAM(s) Oral daily    MEDICATIONS  (PRN):  acetaminophen     Tablet .. 650 milliGRAM(s) Oral every 6 hours PRN Temp greater or equal to 38C (100.4F), Mild Pain (1 - 3)    HOME MEDICATIONS:  finasteride 5 mg oral tablet: 1 tab(s) orally once a day  TLSO Brace: Tlso    PHYSICAL EXAM  Vital Signs Last 24 Hrs  T(C): 36.8 (04 May 2024 11:50), Max: 36.8 (04 May 2024 11:50)  T(F): 98.2 (04 May 2024 11:50), Max: 98.2 (04 May 2024 11:50)  HR: 74 (04 May 2024 11:50) (66 - 74)  BP: 126/88 (04 May 2024 11:50) (126/88 - 161/83)  BP(mean): --  RR: 18 (04 May 2024 11:50) (18 - 18)  SpO2: 95% (04 May 2024 11:50) (95% - 95%)    Parameters below as of 04 May 2024 11:50  Patient On (Oxygen Delivery Method): room air        05-03-24 @ 07:01  -  05-04-24 @ 07:00  --------------------------------------------------------  IN: 960 mL / OUT: 1400 mL / NET: -440 mL    05-04-24 @ 07:01  -  05-04-24 @ 13:31  --------------------------------------------------------  IN: 600 mL / OUT: 0 mL / NET: 600 mL      CONSTITUTIONAL: Well-groomed, in no apparent distress;  EYES: No conjunctival or scleral injection, non-icteric;  ENMT: No external nasal lesions; Normal outer ears;  NECK: Trachea midline;  RESPIRATORY: Normal respiratory effort; Decreased breathe sounds bilaterally without wheeze/rhonchi/rales;  CARDIOVASCULAR: Regular rate and rhythm;  GASTROINTESTINAL: Non-distended; No palpable masses; No rebound/guarding;  EXTREMITIES:  No lower extremity edema;  NEUROLOGY: A+O to person, place, and time; Does respond to commands appropriately;  PSYCHIATRY: Mood and Affect appropriate    LABS:                        RADIOLOGY & ADDITIONAL TESTS:  EKG  12 Lead ECG:   Ventricular Rate 79 BPM    Atrial Rate 79 BPM    P-R Interval 144 ms    QRS Duration 102 ms    Q-T Interval 388 ms    QTC Calculation(Bazett) 444 ms    P Axis 26 degrees    R Axis -13 degrees    T Axis 47 degrees    Diagnosis Line NORMAL SINUS RHYTHM  NORMAL ECG  WHEN COMPARED WITH ECG OF 25-APR-2024 17:26, (UNCONFIRMED)  NO SIGNIFICANT CHANGE WAS FOUND    Confirmed by MD Patton Ronald ([id number]) on 4/29/2024 9:33:27 AM (04-26-24 @ 04:22)  12 Lead ECG:   Ventricular Rate 106 BPM    Atrial Rate 106 BPM    P-R Interval 134 ms    QRS Duration 92 ms    Q-T Interval 336 ms    QTC Calculation(Bazett) 446 ms    P Axis 28 degrees    R Axis -27 degrees    T Axis 46 degrees    Diagnosis Line BASELINE ARTIFACT  SINUS TACHYCARDIA  PLEASE REPEAT    Confirmed by MD Patton Ronald ([id number]) on 4/29/2024 11:47:43 AM (04-25-24 @ 17:26)    CT Angio Chest PE Protocol w/ IV Cont:   ACC: [id number] EXAM:  CT ANGIO CHEST PULM ART WAWIC   ORDERED BY: ALEXANDRA DUTTON     ACC: [id number] EXAM:  CT ABDOMEN AND PELVIS IC   ORDERED BY: ALEXANDRA DUTTON     PROCEDURE DATE:  04/25/2024          INTERPRETATION:  CLINICAL INFORMATION:Trauma code    COMPARISON: CT abdomen pelvis 9/25/2014.    CONTRAST/COMPLICATIONS:  IV Contrast: IV contrast documented in unlinked concurrent exam   (accession [id number]), Omnipaque 350 (accession [id number])  90 cc   administered   10 cc discarded  Oral Contrast: NONE  Complications: None reported at time of study completion    PROCEDURE:  CT of the Chest, Abdomen and Pelvis was performed.  Imaging was performed through the chest in the arterial phase followed by   imaging of the abdomen and pelvis in the portal venous phase.  Sagittal and coronal reformats were performed.    FINDINGS:  CHEST:  LUNGS AND LARGE AIRWAYS: Patent central airways. Mild bibasilar   atelectasis right greater than left. Numerous bilateral subcentimeter   calcified granulomas.  PLEURA: No pleural effusion.  VESSELS: No pulmonary embolism to level of the subsegmental arteries.   Coronary artery calcifications..  HEART: Heart size is normal. No pericardial effusion.  MEDIASTINUM AND MELVIN: No lymphadenopathy.  CHEST WALL AND LOWER NECK: Within normal limits.    ABDOMEN AND PELVIS:  LIVER: Subcentimeter hypodensity in the periphery of the right lobe of   liver.  BILE DUCTS: Normal caliber.  GALLBLADDER: Cholelithiasis.  SPLEEN: Within normal limits.  PANCREAS: Pancreatic calcifications..  ADRENALS: Within normal limits.  KIDNEYS/URETERS: No hydronephrosis. Nonobstructive 0.3, 0.3, and 0.5 cm   left renal calculi. Nonobstructive 0.3 right renal calculi. Hyperdense   exophytic left upper pole renal nodule measuring 2.7 x 2.5 cm. This may   represent a hemorrhagic cyst but remains indeterminant. Recommend   nonemergent renal ultrasound. Right renal cysts and subcentimeter   hypodensities, too small to stress.    BLADDER: Within normal limits.  REPRODUCTIVE ORGANS: Prostate is enlarged. Penile pump and reservoir.    BOWEL: No bowel obstruction. Appendix is normal.  PERITONEUM: No ascites.  VESSELS: Atherosclerotic changes.  RETROPERITONEUM/LYMPH NODES: No lymphadenopathy.  ABDOMINAL WALL: Bowel-containing left inguinal hernia. Left gluteal   lipoma. Small fat-containing right inguinal hernia.  BONES: Degenerative changes. Lumbar levoscoliosis.  Mildly displaced right eighth anterolateral right rib fracture.  Marked height loss of the L1 vertebral body of uncertain chronicity but   new since the previous exam of 9/25/2014. Correlate clinically for pain.    IMPRESSION:    Mildly displaced right eighth anterolateral right rib fracture. Marked   height loss of the L1 vertebral body of uncertain chronicity but new   since the previous exam of 9/25/2014. Correlate clinically for pain.    No pulmonary embolism.    Hyperdense left renal cyst possibly hemorrhagic cyst but remains   indeterminant. Recommend nonemergent renal ultrasound.        --- End of Report ---           MYRIAM FLOWERS MD; Resident Radiologist  This document has been electronically signed.  GEOVANNA BLANCA MD; Attending Radiologist  This document has been electronically signed. Apr 25 2024  8:58PM (04-25-24 @ 20:19)  CT Head No Cont:   ACC: [id number] EXAM:  CT REFORM SPINE T  IC   ORDERED BY: ALEXANDRA DUTTON     ACC: [id number] EXAM:  CT REFORM SPINE L IC   ORDERED BY: ALEXANDRA DUTTON     ACC: [id number] EXAM:  CT BRAIN   ORDERED BY: ALEXANDRA DUTTON     ACC: [id number] EXAM:  CTCERVICAL SPINE   ORDERED BY: ALEXANDRA DUTTON     PROCEDURE DATE:  04/25/2024          INTERPRETATION:  CLINICAL STATEMENT: Trauma..    TECHNIQUE: CT of the head and cervical spine were performed without IV   contrast. 3-D/MIP images obtained    COMPARISON: CT head 2/1/2010    FINDINGS:  Head:  There is mild diffuse parenchymal volume loss.    There are areas of low attenuation in the periventricular white matter   likely related to mild chronic microvascular ischemic changes.    There is no acute intracranial hemorrhage, parenchymal mass, mass effect   or midline shift. There is no acute territorial infarct. There is no   hydrocephalus. Partial empty sella noted    The cranium is intact.    Retention cyst/polyps and nasal sinuses.    Cervical spine:  The vertebral body heights are intact. Grade 1 anterolisthesis C2 on C3,   C7 on T1    Nonspecific small sclerotic lesion C3 left pedicle noted.    Evaluation of the spinal canal is limited on a CT exam.  Multilevel   degenerative changes noted resulting in multilevel spinal canal stenosis   and neural foraminal narrowing.    Thoracic and lumbar spine reformats:  The thoracic vertebral body heights and alignment are maintained.    Moderate compression fracture L1 vertebral body noted appears chronic.   Mild posterior retropulsion results in mild spinal canal stenosis.   Remaining lumbar vertebral body heights intact. Scoliosis noted.    Evaluation of the spinal canal is limited on a CT exam.  Multilevel   degenerative changes noted resulting in multilevel spinal canal stenosis   and neural foraminal narrowing.      IMPRESSION:  No acute intracranial hemorrhage    No acute fracture cervical spine. If pain persists, follow-up MRI exam   recommended (if no contraindication).    No acute fracture thoracic and lumbar spine. If pain persists, follow-up   MRI exam recommended (if no contraindication).    --- End of Report ---            SIMRAN HENDERSON MD; Attending Radiologist  This document has been electronically signed. Apr 25 20248:49PM (04-25-24 @ 20:07)  
[facility] Division of Hospital Medicine  Tato Patterson DO  Pager (M-F, 8A-5P):  MS Teams PREFERRED        SUBJECTIVE / OVERNIGHT EVENTS:  Patient seen at the bedside during morning rounds in no acute distress, denies abdominal pain, nausea, vomiting, chest pain, shortness of breath.     MEDICATIONS  (STANDING):  cefTRIAXone   IVPB 1000 milliGRAM(s) IV Intermittent every 24 hours  finasteride 5 milliGRAM(s) Oral daily  heparin   Injectable 5000 Unit(s) SubCutaneous every 8 hours  sodium chloride 0.9%. 1000 milliLiter(s) (100 mL/Hr) IV Continuous <Continuous>    MEDICATIONS  (PRN):  acetaminophen     Tablet .. 650 milliGRAM(s) Oral every 6 hours PRN Temp greater or equal to 38C (100.4F), Mild Pain (1 - 3)  oxyCODONE    IR 2.5 milliGRAM(s) Oral every 6 hours PRN Moderate Pain (4 - 6)      I&O's Summary    25 Apr 2024 07:01  -  26 Apr 2024 07:00  --------------------------------------------------------  IN: 0 mL / OUT: 1600 mL / NET: -1600 mL        PHYSICAL EXAM:  Vital Signs Last 24 Hrs  T(C): 36.3 (26 Apr 2024 04:49), Max: 36.4 (26 Apr 2024 00:30)  T(F): 97.3 (26 Apr 2024 04:49), Max: 97.6 (26 Apr 2024 00:30)  HR: 80 (26 Apr 2024 04:49) (67 - 117)  BP: 124/68 (26 Apr 2024 04:49) (111/78 - 173/121)  BP(mean): --  RR: 18 (26 Apr 2024 04:49) (18 - 24)  SpO2: 99% (26 Apr 2024 04:49) (80% - 100%)    Parameters below as of 26 Apr 2024 04:49  Patient On (Oxygen Delivery Method): nasal cannula      CONSTITUTIONAL: Elderly male, appears in mild discomfort  RESP: No respiratory distress, no use of accessory muscles; CTA b/l, no WRR  CV: RRR, +S1S2, no MRG; no JVD; no peripheral edema, +B/l anterior chest wall ttp, mild R anterior chest wall ecchymoses  GI: Soft, NT, ND, no rebound, no guarding; no palpable masses; no hepatosplenomegaly; no hernia palpated  LYMPH: No cervical LAD or tenderness; no axillary LAD or tenderness; no inguinal LAD or tenderness  MSK: No digital clubbing or cyanosis; examination of the (head/neck/spine/ribs/pelvis, RUE, LUE, RLE, LLE) without misalignment,   Normal ROM of b/l LE without pain, no spinal tenderness, normal muscle strength/tone  SKIN: No rashes or ulcers noted; no subcutaneous nodules or induration palpable  NEURO: A&Ox3, CN II-XII intact; normal reflexes in upper and lower extremities, sensation intact in upper and lower extremities b/l to light touch   BACK: No spinal tenderness to palpation, no CVA tenderness   PSYCH: Appropriate insight/judgment; A+O x 3, labile mood, intermittently emotional and tearful, denies SI/HI  : leger in place draining clear urine.      LABS:                        15.6   19.35 )-----------( 270      ( 26 Apr 2024 06:02 )             46.2     04-26    143  |  105  |  67<H>  ----------------------------<  114<H>  4.0   |  23  |  1.48<H>    Ca    9.7      26 Apr 2024 06:02  Phos  3.2     04-26  Mg     2.5     04-26    TPro  6.9  /  Alb  3.9  /  TBili  0.9  /  DBili  x   /  AST  108<H>  /  ALT  87<H>  /  AlkPhos  92  04-26    PT/INR - ( 25 Apr 2024 17:24 )   PT: 13.0 sec;   INR: 1.19 ratio         PTT - ( 25 Apr 2024 17:24 )  PTT:32.8 sec  CARDIAC MARKERS ( 26 Apr 2024 07:00 )  x     / x     / 1652 U/L / x     / x      CARDIAC MARKERS ( 25 Apr 2024 17:24 )  x     / x     / 3521 U/L / x     / x          Urinalysis Basic - ( 26 Apr 2024 06:02 )    Color: x / Appearance: x / SG: x / pH: x  Gluc: 114 mg/dL / Ketone: x  / Bili: x / Urobili: x   Blood: x / Protein: x / Nitrite: x   Leuk Esterase: x / RBC: x / WBC x   Sq Epi: x / Non Sq Epi: x / Bacteria: x          RADIOLOGY & ADDITIONAL TESTS:  Results Reviewed: CBC/CMP personally reviewed by me Hgb 15.6   Imaging Personally Reviewed:  Electrocardiogram Personally Reviewed:    COORDINATION OF CARE:  Care Discussed with Consultants/Other Providers [Y/N]: Y  Prior or Outpatient Records Reviewed [Y/N]: Y  
[facility] Division of Hospital Medicine  Tato Patterson DO  Pager (M-F, 8A-5P):  MS Teams PREFERRED        SUBJECTIVE / OVERNIGHT EVENTS:  Patient seen at bedside, in no acute distress.   Aware of PT consult results from yesterday recommending Acute inpatient rehabilitation  No overnight events.     MEDICATIONS  (STANDING):  finasteride 5 milliGRAM(s) Oral daily  heparin   Injectable 5000 Unit(s) SubCutaneous every 8 hours  sodium chloride 0.9%. 1000 milliLiter(s) (100 mL/Hr) IV Continuous <Continuous>    MEDICATIONS  (PRN):  acetaminophen     Tablet .. 650 milliGRAM(s) Oral every 6 hours PRN Temp greater or equal to 38C (100.4F), Mild Pain (1 - 3)  oxyCODONE    IR 2.5 milliGRAM(s) Oral every 6 hours PRN Moderate Pain (4 - 6)      I&O's Summary    27 Apr 2024 07:01  -  28 Apr 2024 07:00  --------------------------------------------------------  IN: 480 mL / OUT: 650 mL / NET: -170 mL        PHYSICAL EXAM:  Vital Signs Last 24 Hrs  T(C): 36.5 (28 Apr 2024 04:04), Max: 37.1 (27 Apr 2024 20:48)  T(F): 97.7 (28 Apr 2024 04:04), Max: 98.7 (27 Apr 2024 20:48)  HR: 73 (28 Apr 2024 04:04) (73 - 85)  BP: 154/82 (28 Apr 2024 04:04) (123/86 - 175/76)  BP(mean): --  RR: 18 (28 Apr 2024 04:04) (18 - 18)  SpO2: 97% (28 Apr 2024 04:04) (97% - 97%)    Parameters below as of 28 Apr 2024 04:04  Patient On (Oxygen Delivery Method): room air      CONSTITUTIONAL: Elderly male, appears in mild discomfort  RESP: No respiratory distress, no use of accessory muscles; CTA b/l, no WRR  CV: RRR, +S1S2, no MRG; no JVD; no peripheral edema, +B/l anterior chest wall ttp, mild R anterior chest wall ecchymoses  GI: Soft, NT, ND, no rebound, no guarding; no palpable masses; no hepatosplenomegaly; no hernia palpated  MSK: No digital clubbing or cyanosis; examination of the (head/neck/spine/ribs/pelvis, RUE, LUE, RLE, LLE) without misalignment,   Normal ROM of b/l LE without pain, no spinal tenderness, normal muscle strength/tone  SKIN: No rashes or ulcers noted; no subcutaneous nodules or induration palpable  NEURO: A&Ox3, CN II-XII intact; normal reflexes in upper and lower extremities, sensation intact in upper and lower extremities b/l to light touch   BACK: No spinal tenderness to palpation, no CVA tenderness   PSYCH: Appropriate insight/judgment; A+O x 3,   : leger in place draining clear urine.    LABS:                        13.6   10.70 )-----------( 201      ( 28 Apr 2024 05:45 )             40.9     04-28    142  |  108  |  33<H>  ----------------------------<  118<H>  3.6   |  25  |  0.93    Ca    9.4      28 Apr 2024 05:45  Phos  2.7     04-27  Mg     2.4     04-27    TPro  6.5  /  Alb  3.5  /  TBili  0.6  /  DBili  x   /  AST  52<H>  /  ALT  70<H>  /  AlkPhos  72  04-28      CARDIAC MARKERS ( 28 Apr 2024 05:45 )  x     / x     / 501 U/L / x     / x      CARDIAC MARKERS ( 27 Apr 2024 05:48 )  x     / x     / 716 U/L / x     / x          Urinalysis Basic - ( 28 Apr 2024 05:45 )    Color: x / Appearance: x / SG: x / pH: x  Gluc: 118 mg/dL / Ketone: x  / Bili: x / Urobili: x   Blood: x / Protein: x / Nitrite: x   Leuk Esterase: x / RBC: x / WBC x   Sq Epi: x / Non Sq Epi: x / Bacteria: x        Culture - Urine (collected 25 Apr 2024 19:14)  Source: Clean Catch Clean Catch (Midstream)  Final Report (26 Apr 2024 23:03):    No growth    Culture - Blood (collected 25 Apr 2024 19:10)  Source: .Blood Blood-Peripheral  Preliminary Report (27 Apr 2024 23:07):    No growth at 48 Hours    Culture - Blood (collected 25 Apr 2024 19:05)  Source: .Blood Blood-Peripheral  Preliminary Report (27 Apr 2024 23:07):    No growth at 48 Hours        RADIOLOGY & ADDITIONAL TESTS:  Results Reviewed: CBC/CMP personally reviewed by me Hgb 13.6 Cr 0.93   Imaging Personally Reviewed:  Electrocardiogram Personally Reviewed:    COORDINATION OF CARE:  Care Discussed with Consultants/Other Providers [Y/N]: Y  Prior or Outpatient Records Reviewed [Y/N]: Y  
SUBJECTIVE / OVERNIGHT EVENTS:  Today is hospital day 10d. There are no new issues or overnight events.   Did not endorse any headache, lightheadedness, vertigo, shortness of breathe, cough, chest pain, palpitations, tachycardia, abdominal pain, nausea, vomiting, diarrhea or constipation currently    HPI:  78-year-old male with a history of HTN, bipolar disorder, depression, personality disorder with past ECT treatment, prostate ca s/p TURP presents after a fall that occurred 2 days ago while cleaning. Patient denies head strike or LOC but was unable to get up and was found on the ground by his neighbor after he was heard screaming. The neighbor activated EMS and pt was found to be hypoxemic to 80%, started on oxygen.  Patient denies any history of smoking, alcohol use, alcohol withdrawal, recreational or illicit drug use, fevers, chills, chest pain, nausea, vomiting, dysuria, diarrhea. Does not take any medications at home and does not have a PCP, has not seen a doctor in several years. Patient reports he lives alone, does not have a partner or living siblings.     On arrival T 97.1, HR , -173/, RR 20-24, Sat 80%, placed on 6 LNC, now on 2L.   Labs notable for WBC 22.91, neutrophilic predominance 81.8%, Hb 17.8, plt 344. Na 146, BUN/Cr 73/1.52, Ca 10.8. , AST//47, CK 3521. Blood alcohol content <10. UA w 15 ketones, neg nitrites, neg leuk esterase, 0 wbc, neg bacteria, moderate blood, 2 RBCs    CXR: No evidence of acute pulmonary disease or trauma. Xray pelvis: No fracture. No dislocation.  CT Head: No acute intracranial hemorrhage  CTA Chest : Mildly displaced right eighth anterolateral right rib fracture. Marked height loss of the L1 vertebral body of uncertain chronicity but new since the previous exam of 9/25/2014. Correlate clinically for pain. No pulmonary embolism. Hyperdense left renal cyst possibly hemorrhagic cyst but remains indeterminant. Recommend nonemergent renal ultrasound. CT cervical/thoracic/lumbar spine w IV: No acute fracture cervical spine. No acute fracture thoracic and lumbar spine. If pain persists, follow-up MRI exam recommended (if no contraindication).    s/p Ceftriaxone 1 g, 2 L LR bolus, 4 mg IV morphine in the ED   (25 Apr 2024 23:27)    MEDICATIONS  (STANDING):  finasteride 5 milliGRAM(s) Oral daily  heparin   Injectable 5000 Unit(s) SubCutaneous every 8 hours  lisinopril 5 milliGRAM(s) Oral daily  melatonin 3 milliGRAM(s) Oral at bedtime  NIFEdipine XL 30 milliGRAM(s) Oral daily    MEDICATIONS  (PRN):  acetaminophen     Tablet .. 650 milliGRAM(s) Oral every 6 hours PRN Temp greater or equal to 38C (100.4F), Mild Pain (1 - 3)    HOME MEDICATIONS:  finasteride 5 mg oral tablet: 1 tab(s) orally once a day  TLSO Brace: Tlso    PHYSICAL EXAM  Vital Signs Last 24 Hrs  T(C): 36.7 (05 May 2024 11:05), Max: 36.7 (05 May 2024 11:05)  T(F): 98.1 (05 May 2024 11:05), Max: 98.1 (05 May 2024 11:05)  HR: 71 (05 May 2024 11:05) (63 - 71)  BP: 127/81 (05 May 2024 11:05) (127/81 - 173/95)  BP(mean): --  RR: 18 (05 May 2024 11:05) (18 - 18)  SpO2: 98% (05 May 2024 11:05) (95% - 98%)    Parameters below as of 05 May 2024 11:05  Patient On (Oxygen Delivery Method): room air        05-04-24 @ 07:01  -  05-05-24 @ 07:00  --------------------------------------------------------  IN: 840 mL / OUT: 1700 mL / NET: -860 mL    05-05-24 @ 07:01  -  05-05-24 @ 13:52  --------------------------------------------------------  IN: 120 mL / OUT: 0 mL / NET: 120 mL      CONSTITUTIONAL: Well-groomed, in no apparent distress;  EYES: No conjunctival or scleral injection, non-icteric;  ENMT: No external nasal lesions; Normal outer ears;  NECK: Trachea midline;  RESPIRATORY: Normal respiratory effort; Decreased breathe sounds bilaterally without wheeze/rhonchi/rales;  CARDIOVASCULAR: Regular rate and rhythm;  GASTROINTESTINAL: Non-distended; No palpable masses; No rebound/guarding;  EXTREMITIES:  No lower extremity edema;  NEUROLOGY: A+O to person, place, and time; Does respond to commands appropriately;  PSYCHIATRY: Mood and Affect appropriate    LABS:                        RADIOLOGY & ADDITIONAL TESTS:  EKG  12 Lead ECG:   Ventricular Rate 79 BPM    Atrial Rate 79 BPM    P-R Interval 144 ms    QRS Duration 102 ms    Q-T Interval 388 ms    QTC Calculation(Bazett) 444 ms    P Axis 26 degrees    R Axis -13 degrees    T Axis 47 degrees    Diagnosis Line NORMAL SINUS RHYTHM  NORMAL ECG  WHEN COMPARED WITH ECG OF 25-APR-2024 17:26, (UNCONFIRMED)  NO SIGNIFICANT CHANGE WAS FOUND    Confirmed by MD Patton Ronald ([id number]) on 4/29/2024 9:33:27 AM (04-26-24 @ 04:22)  12 Lead ECG:   Ventricular Rate 106 BPM    Atrial Rate 106 BPM    P-R Interval 134 ms    QRS Duration 92 ms    Q-T Interval 336 ms    QTC Calculation(Bazett) 446 ms    P Axis 28 degrees    R Axis -27 degrees    T Axis 46 degrees    Diagnosis Line BASELINE ARTIFACT  SINUS TACHYCARDIA  PLEASE REPEAT    Confirmed by MD Patton Ronald ([id number]) on 4/29/2024 11:47:43 AM (04-25-24 @ 17:26)    CT Angio Chest PE Protocol w/ IV Cont:   ACC: [id number] EXAM:  CT ANGIO CHEST PULM ART WAWIC   ORDERED BY: ALEXANDRA DUTTON     ACC: [id number] EXAM:  CT ABDOMEN AND PELVIS IC   ORDERED BY: ALEXANDRA DUTTON     PROCEDURE DATE:  04/25/2024          INTERPRETATION:  CLINICAL INFORMATION:Trauma code    COMPARISON: CT abdomen pelvis 9/25/2014.    CONTRAST/COMPLICATIONS:  IV Contrast: IV contrast documented in unlinked concurrent exam   (accession [id number]), Omnipaque 350 (accession [id number])  90 cc   administered   10 cc discarded  Oral Contrast: NONE  Complications: None reported at time of study completion    PROCEDURE:  CT of the Chest, Abdomen and Pelvis was performed.  Imaging was performed through the chest in the arterial phase followed by   imaging of the abdomen and pelvis in the portal venous phase.  Sagittal and coronal reformats were performed.    FINDINGS:  CHEST:  LUNGS AND LARGE AIRWAYS: Patent central airways. Mild bibasilar   atelectasis right greater than left. Numerous bilateral subcentimeter   calcified granulomas.  PLEURA: No pleural effusion.  VESSELS: No pulmonary embolism to level of the subsegmental arteries.   Coronary artery calcifications..  HEART: Heart size is normal. No pericardial effusion.  MEDIASTINUM AND MELVIN: No lymphadenopathy.  CHEST WALL AND LOWER NECK: Within normal limits.    ABDOMEN AND PELVIS:  LIVER: Subcentimeter hypodensity in the periphery of the right lobe of   liver.  BILE DUCTS: Normal caliber.  GALLBLADDER: Cholelithiasis.  SPLEEN: Within normal limits.  PANCREAS: Pancreatic calcifications..  ADRENALS: Within normal limits.  KIDNEYS/URETERS: No hydronephrosis. Nonobstructive 0.3, 0.3, and 0.5 cm   left renal calculi. Nonobstructive 0.3 right renal calculi. Hyperdense   exophytic left upper pole renal nodule measuring 2.7 x 2.5 cm. This may   represent a hemorrhagic cyst but remains indeterminant. Recommend   nonemergent renal ultrasound. Right renal cysts and subcentimeter   hypodensities, too small to stress.    BLADDER: Within normal limits.  REPRODUCTIVE ORGANS: Prostate is enlarged. Penile pump and reservoir.    BOWEL: No bowel obstruction. Appendix is normal.  PERITONEUM: No ascites.  VESSELS: Atherosclerotic changes.  RETROPERITONEUM/LYMPH NODES: No lymphadenopathy.  ABDOMINAL WALL: Bowel-containing left inguinal hernia. Left gluteal   lipoma. Small fat-containing right inguinal hernia.  BONES: Degenerative changes. Lumbar levoscoliosis.  Mildly displaced right eighth anterolateral right rib fracture.  Marked height loss of the L1 vertebral body of uncertain chronicity but   new since the previous exam of 9/25/2014. Correlate clinically for pain.    IMPRESSION:    Mildly displaced right eighth anterolateral right rib fracture. Marked   height loss of the L1 vertebral body of uncertain chronicity but new   since the previous exam of 9/25/2014. Correlate clinically for pain.    No pulmonary embolism.    Hyperdense left renal cyst possibly hemorrhagic cyst but remains   indeterminant. Recommend nonemergent renal ultrasound.        --- End of Report ---           MYRIAM FLOWERS MD; Resident Radiologist  This document has been electronically signed.  GEOVANNA BLANCA MD; Attending Radiologist  This document has been electronically signed. Apr 25 2024  8:58PM (04-25-24 @ 20:19)  CT Head No Cont:   ACC: [id number] EXAM:  CT REFORM SPINE T  IC   ORDERED BY: ALEXANDRA DUTTON     ACC: [id number] EXAM:  CT REFORM SPINE L IC   ORDERED BY: ALEXANDRA DUTTON     ACC: [id number] EXAM:  CT BRAIN   ORDERED BY: ALEXANDRA DUTTON     ACC: [id number] EXAM:  CTCERVICAL SPINE   ORDERED BY: ALEXANDRA DUTTON     PROCEDURE DATE:  04/25/2024          INTERPRETATION:  CLINICAL STATEMENT: Trauma..    TECHNIQUE: CT of the head and cervical spine were performed without IV   contrast. 3-D/MIP images obtained    COMPARISON: CT head 2/1/2010    FINDINGS:  Head:  There is mild diffuse parenchymal volume loss.    There are areas of low attenuation in the periventricular white matter   likely related to mild chronic microvascular ischemic changes.    There is no acute intracranial hemorrhage, parenchymal mass, mass effect   or midline shift. There is no acute territorial infarct. There is no   hydrocephalus. Partial empty sella noted    The cranium is intact.    Retention cyst/polyps and nasal sinuses.    Cervical spine:  The vertebral body heights are intact. Grade 1 anterolisthesis C2 on C3,   C7 on T1    Nonspecific small sclerotic lesion C3 left pedicle noted.    Evaluation of the spinal canal is limited on a CT exam.  Multilevel   degenerative changes noted resulting in multilevel spinal canal stenosis   and neural foraminal narrowing.    Thoracic and lumbar spine reformats:  The thoracic vertebral body heights and alignment are maintained.    Moderate compression fracture L1 vertebral body noted appears chronic.   Mild posterior retropulsion results in mild spinal canal stenosis.   Remaining lumbar vertebral body heights intact. Scoliosis noted.    Evaluation of the spinal canal is limited on a CT exam.  Multilevel   degenerative changes noted resulting in multilevel spinal canal stenosis   and neural foraminal narrowing.      IMPRESSION:  No acute intracranial hemorrhage    No acute fracture cervical spine. If pain persists, follow-up MRI exam   recommended (if no contraindication).    No acute fracture thoracic and lumbar spine. If pain persists, follow-up   MRI exam recommended (if no contraindication).    --- End of Report ---            SIMRAN HENDERSON MD; Attending Radiologist  This document has been electronically signed. Apr 25 20248:49PM (04-25-24 @ 20:07)  
[facility] Division of Hospital Medicine  Tato Patterson DO  Pager (M-F, 8A-5P):  MS Teams PREFERRED        SUBJECTIVE / OVERNIGHT EVENTS:  Patient seen at bedside no acute distress  Requests to work with PT today  Informed of pending PM&R consult  Denies any chest pain, nausea, vomiting, or new muscle aches    MEDICATIONS  (STANDING):  finasteride 5 milliGRAM(s) Oral daily  heparin   Injectable 5000 Unit(s) SubCutaneous every 8 hours  sodium chloride 0.9%. 1000 milliLiter(s) (100 mL/Hr) IV Continuous <Continuous>    MEDICATIONS  (PRN):  acetaminophen     Tablet .. 650 milliGRAM(s) Oral every 6 hours PRN Temp greater or equal to 38C (100.4F), Mild Pain (1 - 3)  oxyCODONE    IR 2.5 milliGRAM(s) Oral every 6 hours PRN Moderate Pain (4 - 6)      I&O's Summary    26 Apr 2024 07:01  -  27 Apr 2024 07:00  --------------------------------------------------------  IN: 1380 mL / OUT: 1550 mL / NET: -170 mL        PHYSICAL EXAM:  Vital Signs Last 24 Hrs  T(C): 36.7 (27 Apr 2024 04:43), Max: 36.9 (26 Apr 2024 13:38)  T(F): 98 (27 Apr 2024 04:43), Max: 98.4 (26 Apr 2024 13:38)  HR: 98 (27 Apr 2024 04:43) (79 - 98)  BP: 152/76 (27 Apr 2024 04:43) (114/78 - 152/76)  BP(mean): --  RR: 18 (27 Apr 2024 04:43) (18 - 19)  SpO2: 97% (27 Apr 2024 04:43) (91% - 98%)    Parameters below as of 27 Apr 2024 07:21  Patient On (Oxygen Delivery Method): nasal cannula  O2 Flow (L/min): 2    CONSTITUTIONAL: Elderly male, appears in mild discomfort  RESP: No respiratory distress, no use of accessory muscles; CTA b/l, no WRR  CV: RRR, +S1S2, no MRG; no JVD; no peripheral edema, +B/l anterior chest wall ttp, mild R anterior chest wall ecchymoses  GI: Soft, NT, ND, no rebound, no guarding; no palpable masses; no hepatosplenomegaly; no hernia palpated  LYMPH: No cervical LAD or tenderness; no axillary LAD or tenderness; no inguinal LAD or tenderness  MSK: No digital clubbing or cyanosis; examination of the (head/neck/spine/ribs/pelvis, RUE, LUE, RLE, LLE) without misalignment,   Normal ROM of b/l LE without pain, no spinal tenderness, normal muscle strength/tone  SKIN: No rashes or ulcers noted; no subcutaneous nodules or induration palpable  NEURO: A&Ox3, CN II-XII intact; normal reflexes in upper and lower extremities, sensation intact in upper and lower extremities b/l to light touch   BACK: No spinal tenderness to palpation, no CVA tenderness   PSYCH: Appropriate insight/judgment; A+O x 3, labile mood, intermittently emotional and tearful, denies SI/HI  : leger in place draining clear urine.    LABS:                        14.0   12.39 )-----------( 225      ( 27 Apr 2024 05:48 )             42.9     04-27    142  |  107  |  48<H>  ----------------------------<  122<H>  3.8   |  25  |  1.20    Ca    9.4      27 Apr 2024 05:48  Phos  2.7     04-27  Mg     2.4     04-27    TPro  6.5  /  Alb  3.6  /  TBili  0.6  /  DBili  x   /  AST  61<H>  /  ALT  68<H>  /  AlkPhos  75  04-27    PT/INR - ( 25 Apr 2024 17:24 )   PT: 13.0 sec;   INR: 1.19 ratio         PTT - ( 25 Apr 2024 17:24 )  PTT:32.8 sec  CARDIAC MARKERS ( 27 Apr 2024 05:48 )  x     / x     / 716 U/L / x     / x      CARDIAC MARKERS ( 26 Apr 2024 07:00 )  x     / x     / 1652 U/L / x     / x      CARDIAC MARKERS ( 25 Apr 2024 17:24 )  x     / x     / 3521 U/L / x     / x          Urinalysis Basic - ( 27 Apr 2024 05:48 )    Color: x / Appearance: x / SG: x / pH: x  Gluc: 122 mg/dL / Ketone: x  / Bili: x / Urobili: x   Blood: x / Protein: x / Nitrite: x   Leuk Esterase: x / RBC: x / WBC x   Sq Epi: x / Non Sq Epi: x / Bacteria: x        Culture - Urine (collected 25 Apr 2024 19:14)  Source: Clean Catch Clean Catch (Midstream)  Final Report (26 Apr 2024 23:03):    No growth    Culture - Blood (collected 25 Apr 2024 19:10)  Source: .Blood Blood-Peripheral  Preliminary Report (26 Apr 2024 23:02):    No growth at 24 hours    Culture - Blood (collected 25 Apr 2024 19:05)  Source: .Blood Blood-Peripheral  Preliminary Report (26 Apr 2024 23:02):    No growth at 24 hours        RADIOLOGY & ADDITIONAL TESTS:  Results Reviewed: CBC/CMP personally reviewed by me Hgb 14.0 Cr 1.20   Imaging Personally Reviewed:  Electrocardiogram Personally Reviewed:    COORDINATION OF CARE:  Care Discussed with Consultants/Other Providers [Y/N]: Y  Prior or Outpatient Records Reviewed [Y/N]: Y

## 2024-05-07 NOTE — PROGRESS NOTE ADULT - PROBLEM SELECTOR PLAN 6
failed TOV previously required cystoscopically placed leger per urology and found then to have two 1 cm stones likely causing obstruction  now passed TOV yesterday 5/6 but still with moments with urine retaining in bladder that he is able to urinate out after some time. Pt refusing straight cath  - c/w finasteride  - add flomax 0.4 mg daily  - serial bladder scans prn  - outpt urology follow up  - Monitor i's and o's

## 2024-05-07 NOTE — PROGRESS NOTE ADULT - PROVIDER SPECIALTY LIST ADULT
Rehab Medicine
Hospitalist

## 2024-05-07 NOTE — PROGRESS NOTE ADULT - ASSESSMENT
78-year-old male with a history of HTN, bipolar disorder, personality disorder with past ECT treatment, prostate ca s/p TURP presents after a fall that occurred 2 days ago, course c/b AHRF requiring 6 L NC and MEREDITH iso rhabdo, admitted to medicine for further management.
77 yo M PMH HTN, bipolar disorder, personality disorder with past ECT treatment, prostate ca s/p TURP presents after a fall adm with MEREDITH due to rhabdo, R rib fracture, and hospital course c/b AHRF requiring 6 L NC, now resolved awaiting placement.
78-year-old male with a history of HTN, bipolar disorder, personality disorder with past ECT treatment, prostate ca s/p TURP presents after a fall that occurred 2 days ago, course c/b AHRF requiring 6 L NC and MEREDITH iso rhabdo, admitted to medicine for further management. 
78-year-old male with a history of HTN, bipolar disorder, personality disorder with past ECT treatment, prostate ca s/p TURP presents after a fall that occurred 2 days ago, course c/b AHRF requiring 6 L NC and MEREDITH iso rhabdo, admitted to medicine for further management
78-year-old male with a history of HTN, bipolar disorder, personality disorder with past ECT treatment, prostate ca s/p TURP presents after a fall that occurred 2 days ago, course c/b AHRF requiring 6 L NC and MEREDITH iso rhabdo, admitted to medicine for further management.
79 yo M PMH HTN, bipolar disorder, personality disorder with past ECT treatment, prostate ca s/p TURP presents after a fall adm with MEREDITH due to rhabdo, R rib fracture, and hospital course c/b AHRF requiring 6 L NC, now resolved awaiting placement.
78-year-old male with a history of HTN, bipolar disorder, personality disorder with past ECT treatment, prostate ca s/p TURP presents after a fall that occurred 2 days ago, course c/b AHRF requiring 6 L NC and MEREDITH iso rhabdo, admitted to medicine for further management.

## 2024-05-07 NOTE — PROGRESS NOTE ADULT - PROBLEM SELECTOR PLAN 1
CT w/ Mildly displaced right eighth anterolateral right rib fracture.  Marked height loss of the L1 vertebral body of uncertain chronicity  TTE normal EF  - PT rec for [facility] most recently. Acute rehab request denied by insurance. Plan for [facility]ICK placement - patient is agreeable

## 2024-05-07 NOTE — PROGRESS NOTE ADULT - REASON FOR ADMISSION
Fall, AHRF

## 2024-05-08 ENCOUNTER — TRANSCRIPTION ENCOUNTER (OUTPATIENT)
Age: 79
End: 2024-05-08

## 2024-05-08 VITALS
HEART RATE: 72 BPM | OXYGEN SATURATION: 97 % | RESPIRATION RATE: 18 BRPM | SYSTOLIC BLOOD PRESSURE: 128 MMHG | DIASTOLIC BLOOD PRESSURE: 78 MMHG | TEMPERATURE: 98 F

## 2024-05-08 PROCEDURE — 76775 US EXAM ABDO BACK WALL LIM: CPT

## 2024-05-08 PROCEDURE — 96374 THER/PROPH/DIAG INJ IV PUSH: CPT

## 2024-05-08 PROCEDURE — 97162 PT EVAL MOD COMPLEX 30 MIN: CPT

## 2024-05-08 PROCEDURE — 93005 ELECTROCARDIOGRAM TRACING: CPT

## 2024-05-08 PROCEDURE — 85018 HEMOGLOBIN: CPT

## 2024-05-08 PROCEDURE — 93306 TTE W/DOPPLER COMPLETE: CPT

## 2024-05-08 PROCEDURE — 83735 ASSAY OF MAGNESIUM: CPT

## 2024-05-08 PROCEDURE — 70450 CT HEAD/BRAIN W/O DYE: CPT | Mod: MC

## 2024-05-08 PROCEDURE — 87637 SARSCOV2&INF A&B&RSV AMP PRB: CPT

## 2024-05-08 PROCEDURE — 71045 X-RAY EXAM CHEST 1 VIEW: CPT

## 2024-05-08 PROCEDURE — 87086 URINE CULTURE/COLONY COUNT: CPT

## 2024-05-08 PROCEDURE — 97535 SELF CARE MNGMENT TRAINING: CPT

## 2024-05-08 PROCEDURE — 72125 CT NECK SPINE W/O DYE: CPT | Mod: MC

## 2024-05-08 PROCEDURE — 96375 TX/PRO/DX INJ NEW DRUG ADDON: CPT

## 2024-05-08 PROCEDURE — 99239 HOSP IP/OBS DSCHRG MGMT >30: CPT

## 2024-05-08 PROCEDURE — 36415 COLL VENOUS BLD VENIPUNCTURE: CPT

## 2024-05-08 PROCEDURE — 71275 CT ANGIOGRAPHY CHEST: CPT | Mod: MC

## 2024-05-08 PROCEDURE — 85610 PROTHROMBIN TIME: CPT

## 2024-05-08 PROCEDURE — 74177 CT ABD & PELVIS W/CONTRAST: CPT | Mod: MC

## 2024-05-08 PROCEDURE — 82803 BLOOD GASES ANY COMBINATION: CPT

## 2024-05-08 PROCEDURE — 97530 THERAPEUTIC ACTIVITIES: CPT

## 2024-05-08 PROCEDURE — 97116 GAIT TRAINING THERAPY: CPT

## 2024-05-08 PROCEDURE — 83036 HEMOGLOBIN GLYCOSYLATED A1C: CPT

## 2024-05-08 PROCEDURE — 97166 OT EVAL MOD COMPLEX 45 MIN: CPT

## 2024-05-08 PROCEDURE — 84100 ASSAY OF PHOSPHORUS: CPT

## 2024-05-08 PROCEDURE — 80307 DRUG TEST PRSMV CHEM ANLYZR: CPT

## 2024-05-08 PROCEDURE — 85025 COMPLETE CBC W/AUTO DIFF WBC: CPT

## 2024-05-08 PROCEDURE — 84295 ASSAY OF SERUM SODIUM: CPT

## 2024-05-08 PROCEDURE — 84132 ASSAY OF SERUM POTASSIUM: CPT

## 2024-05-08 PROCEDURE — 85027 COMPLETE CBC AUTOMATED: CPT

## 2024-05-08 PROCEDURE — 87040 BLOOD CULTURE FOR BACTERIA: CPT

## 2024-05-08 PROCEDURE — 80053 COMPREHEN METABOLIC PANEL: CPT

## 2024-05-08 PROCEDURE — 83690 ASSAY OF LIPASE: CPT

## 2024-05-08 PROCEDURE — 82435 ASSAY OF BLOOD CHLORIDE: CPT

## 2024-05-08 PROCEDURE — 85014 HEMATOCRIT: CPT

## 2024-05-08 PROCEDURE — 82962 GLUCOSE BLOOD TEST: CPT

## 2024-05-08 PROCEDURE — 84484 ASSAY OF TROPONIN QUANT: CPT

## 2024-05-08 PROCEDURE — 72100 X-RAY EXAM L-S SPINE 2/3 VWS: CPT

## 2024-05-08 PROCEDURE — 80048 BASIC METABOLIC PNL TOTAL CA: CPT

## 2024-05-08 PROCEDURE — 72170 X-RAY EXAM OF PELVIS: CPT

## 2024-05-08 PROCEDURE — 81001 URINALYSIS AUTO W/SCOPE: CPT

## 2024-05-08 PROCEDURE — 82330 ASSAY OF CALCIUM: CPT

## 2024-05-08 PROCEDURE — G0103: CPT

## 2024-05-08 PROCEDURE — 97110 THERAPEUTIC EXERCISES: CPT

## 2024-05-08 PROCEDURE — 82947 ASSAY GLUCOSE BLOOD QUANT: CPT

## 2024-05-08 PROCEDURE — 85730 THROMBOPLASTIN TIME PARTIAL: CPT

## 2024-05-08 PROCEDURE — 99285 EMERGENCY DEPT VISIT HI MDM: CPT

## 2024-05-08 PROCEDURE — 82550 ASSAY OF CK (CPK): CPT

## 2024-05-08 PROCEDURE — 83605 ASSAY OF LACTIC ACID: CPT

## 2024-05-08 RX ADMIN — FINASTERIDE 5 MILLIGRAM(S): 5 TABLET, FILM COATED ORAL at 14:22

## 2024-05-08 RX ADMIN — Medication 30 MILLIGRAM(S): at 05:25

## 2024-05-08 RX ADMIN — LISINOPRIL 5 MILLIGRAM(S): 2.5 TABLET ORAL at 05:25

## 2024-05-08 RX ADMIN — HEPARIN SODIUM 5000 UNIT(S): 5000 INJECTION INTRAVENOUS; SUBCUTANEOUS at 05:25

## 2024-05-08 RX ADMIN — HEPARIN SODIUM 5000 UNIT(S): 5000 INJECTION INTRAVENOUS; SUBCUTANEOUS at 14:22

## 2024-05-08 NOTE — DISCHARGE NOTE NURSING/CASE MANAGEMENT/SOCIAL WORK - NSDCFUADDAPPT_GEN_ALL_CORE_FT
APPTS ARE READY TO BE MADE: [x] YES    Best Family or Patient Contact (if needed):    Additional Information about above appointments (if needed):    1: Neurosurgery  2: Internal Medicine  3: Urology  4: Wound Care    Other comments or requests:       Left chest wall contusion  Left face abrasion  Bilateral knees abrasion  Incontinence of bowel and bladder  Incontinence Dermatitis  Bilateral groin/scrotum/penis moisture dermatitis  Pressure injury Prophylaxis    Recommend:  1.) topical therapy: sacral/bilateral buttock/bilateral groins/scrotum/penis - cleanse with incontinence cleanser, pat dry, apply Surjit antifungal ointment two time a day and as needed for incontinent episodes  Left chest wall, left face, bilateral knee abrasions - keep clean and dry  2.) Incontinence Management - incontinence cleanser, pads, pericare two times a day  3.) Maintain on an alternating air with low air loss surface  4.) Turn and reposition Q 2 hours  5.) Nutrition optimization - please add Ashish  6.) Offload heels/feet with complete cair air fluidized boots; ensure that the soles of the feet are not resting on the foot board of the bed.        Upon discharge f/u as outpatient at [facility] [address] [facility] [phone]

## 2024-05-08 NOTE — CHART NOTE - NSCHARTNOTEFT_GEN_A_CORE
Called by primary team for difficult leger in the setting of retention. Bladder scan ~500cc.  Multiple attempts prior to my arrival.  Pt seen and examined at bedside. Pt currently refusing, leger, stating he has had too hard of a day and wishes to try another time.  Please page urology back when pt becomes amenable. [phone]
UROLOGY EVENT NOTE    Patient seen and examined at bedside. Called for difficult leger placement.  Pt has pmhx of BPH s/p TURP.    Bladder scan at 4am post void was ~500cc. 2 attempts by nurses overnight to straight catheterize him unsuccessful, overnight urology PA called to place leger however pt refused at that time.   Pt more amendable to leger placement this morning, urology attempted to place coude leger catheter however was unsuccessful.    Bladder scan >900cc.    T(C): 36.7 (04-30-24 @ 06:17), Max: 36.7 (04-30-24 @ 06:17)  HR: 75 (04-30-24 @ 06:17) (64 - 89)  BP: 170/75 (04-30-24 @ 06:17) (125/73 - 170/75)  RR: 18 (04-30-24 @ 06:17) (18 - 18)  SpO2: 98% (04-30-24 @ 06:17) (96% - 98%)    Gen: NAD  Abdomen: mildly tender at suprapubic area     A/P  78yoM pmhx of BPH s/p TURP requiring leger placement. Urology unsuccessful at bedside.   Bladder scan at the moment >900cc.     - Cystoscopy guided leger catheter placement     TO BE DISCUSSED WITH ATTENDING
BP -- 170/72, no BP meds at home  - started on lisinopril 5 mg daily, continue monitoring
Patient was measured and dispensed a TLSO rigid posterior panel extending from the sacrococcygeal junction to the scapular spine with a soft apron front. The TLSO was adjusted to fit the patient's individual anatomy. The orthosis will stabilize and control the spine reduce pain and ROM and assist in the healing process. Care use and function were explained to the patient. All went without incident.   [facility]  [phone]
Seen at bedside, pt feels well without complaints. No n/v/f/chills, cp, sob. Urinating without issues.  VSS. PE well appearing M, NAD, lungs cta, abd sntnd, legs without edema.  Patient has been medically stable for discharge - peer to peer was denied for [facility], now accepted to [facility] and auth went through today. Will go to [facility] today. D/w CM.  All ques answered.  DC time 55 min. d/w JAMI Mujica.

## 2024-05-08 NOTE — DISCHARGE NOTE NURSING/CASE MANAGEMENT/SOCIAL WORK - PATIENT PORTAL LINK FT
You can access the FollowMyHealth Patient Portal offered by [facility] by registering at the following website: [email or web address]. By joining "[employer]"’s FollowMyHealth portal, you will also be able to view your health information using other applications (apps) compatible with our system.
You can access the FollowMyHealth Patient Portal offered by [facility] by registering at the following website: [email or web address]. By joining [facility]’s FollowMyHealth portal, you will also be able to view your health information using other applications (apps) compatible with our system.

## 2024-05-08 NOTE — DISCHARGE NOTE NURSING/CASE MANAGEMENT/SOCIAL WORK - NSDCPEFALRISK_GEN_ALL_CORE
For information on Fall & Injury Prevention, visit: [email or web address] OR  [email or web address] OR  https://www.cdc.gov/steadi/patient.html
For information on Fall & Injury Prevention, visit: [email or web address] OR  [email or web address]-injury OR  https://www.cdc.gov/steadi/patient.html

## 2024-05-22 NOTE — ED PROVIDER NOTE - NS ED NOTE AC HIGH RISK COUNTRIES
Immediate Brief Procedure Note    Patient: Keara Resendiz    Pre-op Diagnosis: Pleural effusion    Post-op Diagnosis: Same    Procedure: left Thoracentesis    Proceduralist: Jamshid Street MD    Anesthesia Type: Local    Findings: Successful thoracentesis, serous pleural fluid, ~1600cc removed    Estimated Blood Loss: minimal, <5cc    Complications: None    Specimens Removed: Yes     No

## 2024-06-05 ENCOUNTER — APPOINTMENT (OUTPATIENT)
Dept: NEUROSURGERY | Facility: CLINIC | Age: 79
End: 2024-06-05

## 2024-10-01 NOTE — ED PROVIDER NOTE - NS ED ROS FT
ROS: no CP/SOB. no cough. no n/v/d/c. no abd pain. no rash. no bleeding. no urinary complaints. no weakness. no vision changes. no HA. no neck/back pain. no extremity swelling. [Negative] : Heme/Lymph

## 2025-01-08 NOTE — ED PROVIDER NOTE - DISCUSSED CLINICAL AND RADIOLOGICAL FINDINGS WITH, MDM
-- DO NOT REPLY / DO NOT REPLY ALL --  -- This inbox is not monitored. If this was sent to the wrong provider or department, reroute message to P ECO Reroute pool. --  -- Message is from Engagement Center Operations (ECO) --    Request Result      Which Result are your requesting?  Chest x-ray    What is the full name of the provider that ordered the lab or test?: [facility] site name: [facility]    Caller Information       Contact Date/Time Type Contact Phone/Fax    01/08/2025 12:23 PM CST Phone (Incoming) Cole Brock (Self) [phone] (M)            Alternative phone number: no    Can a detailed message be left?: Yes - Voicemail     Patient has been advised the message will be addressed within 2-3 business days.         patient

## 2025-05-24 ENCOUNTER — EMERGENCY (EMERGENCY)
Facility: HOSPITAL | Age: 80
LOS: 1 days | End: 2025-05-24
Attending: EMERGENCY MEDICINE
Payer: COMMERCIAL

## 2025-05-24 VITALS
HEART RATE: 73 BPM | TEMPERATURE: 98 F | SYSTOLIC BLOOD PRESSURE: 146 MMHG | RESPIRATION RATE: 20 BRPM | DIASTOLIC BLOOD PRESSURE: 65 MMHG | OXYGEN SATURATION: 98 %

## 2025-05-24 VITALS
DIASTOLIC BLOOD PRESSURE: 86 MMHG | OXYGEN SATURATION: 98 % | RESPIRATION RATE: 18 BRPM | HEIGHT: 70 IN | TEMPERATURE: 98 F | HEART RATE: 97 BPM | WEIGHT: 199.96 LBS | SYSTOLIC BLOOD PRESSURE: 155 MMHG

## 2025-05-24 LAB
ALBUMIN SERPL ELPH-MCNC: 4.4 G/DL — SIGNIFICANT CHANGE UP (ref 3.3–5)
ALP SERPL-CCNC: 76 U/L — SIGNIFICANT CHANGE UP (ref 40–120)
ALT FLD-CCNC: 16 U/L — SIGNIFICANT CHANGE UP (ref 10–45)
ANION GAP SERPL CALC-SCNC: 14 MMOL/L — SIGNIFICANT CHANGE UP (ref 5–17)
APPEARANCE UR: ABNORMAL
APTT BLD: 32.5 SEC — SIGNIFICANT CHANGE UP (ref 26.1–36.8)
AST SERPL-CCNC: 24 U/L — SIGNIFICANT CHANGE UP (ref 10–40)
BACTERIA # UR AUTO: ABNORMAL /HPF
BASOPHILS # BLD AUTO: 0.06 K/UL — SIGNIFICANT CHANGE UP (ref 0–0.2)
BASOPHILS NFR BLD AUTO: 0.5 % — SIGNIFICANT CHANGE UP (ref 0–2)
BILIRUB SERPL-MCNC: 1 MG/DL — SIGNIFICANT CHANGE UP (ref 0.2–1.2)
BILIRUB UR-MCNC: ABNORMAL
BUN SERPL-MCNC: 31 MG/DL — HIGH (ref 7–23)
CALCIUM SERPL-MCNC: 10 MG/DL — SIGNIFICANT CHANGE UP (ref 8.4–10.5)
CHLORIDE SERPL-SCNC: 105 MMOL/L — SIGNIFICANT CHANGE UP (ref 96–108)
CO2 SERPL-SCNC: 22 MMOL/L — SIGNIFICANT CHANGE UP (ref 22–31)
COLOR SPEC: ABNORMAL
CREAT SERPL-MCNC: 1.12 MG/DL — SIGNIFICANT CHANGE UP (ref 0.5–1.3)
DIFF PNL FLD: ABNORMAL
EGFR: 67 ML/MIN/1.73M2 — SIGNIFICANT CHANGE UP
EGFR: 67 ML/MIN/1.73M2 — SIGNIFICANT CHANGE UP
EOSINOPHIL # BLD AUTO: 0.12 K/UL — SIGNIFICANT CHANGE UP (ref 0–0.5)
EOSINOPHIL NFR BLD AUTO: 1 % — SIGNIFICANT CHANGE UP (ref 0–6)
EPI CELLS # UR: PRESENT
GLUCOSE SERPL-MCNC: 133 MG/DL — HIGH (ref 70–99)
GLUCOSE UR QL: NEGATIVE MG/DL — SIGNIFICANT CHANGE UP
HCT VFR BLD CALC: 33.9 % — LOW (ref 39–50)
HGB BLD-MCNC: 11.1 G/DL — LOW (ref 13–17)
IMM GRANULOCYTES NFR BLD AUTO: 0.5 % — SIGNIFICANT CHANGE UP (ref 0–0.9)
INR BLD: 1.17 RATIO — HIGH (ref 0.85–1.16)
KETONES UR QL: NEGATIVE MG/DL — SIGNIFICANT CHANGE UP
LEUKOCYTE ESTERASE UR-ACNC: ABNORMAL
LYMPHOCYTES # BLD AUTO: 16.6 % — SIGNIFICANT CHANGE UP (ref 13–44)
LYMPHOCYTES # BLD AUTO: 2 K/UL — SIGNIFICANT CHANGE UP (ref 1–3.3)
MCHC RBC-ENTMCNC: 32.7 G/DL — SIGNIFICANT CHANGE UP (ref 32–36)
MCHC RBC-ENTMCNC: 33.4 PG — SIGNIFICANT CHANGE UP (ref 27–34)
MCV RBC AUTO: 102.1 FL — HIGH (ref 80–100)
MONOCYTES # BLD AUTO: 1.21 K/UL — HIGH (ref 0–0.9)
MONOCYTES NFR BLD AUTO: 10.1 % — SIGNIFICANT CHANGE UP (ref 2–14)
NEUTROPHILS # BLD AUTO: 8.57 K/UL — HIGH (ref 1.8–7.4)
NEUTROPHILS NFR BLD AUTO: 71.3 % — SIGNIFICANT CHANGE UP (ref 43–77)
NITRITE UR-MCNC: POSITIVE
NRBC BLD AUTO-RTO: 0 /100 WBCS — SIGNIFICANT CHANGE UP (ref 0–0)
PH UR: 5 — SIGNIFICANT CHANGE UP (ref 5–8)
PLATELET # BLD AUTO: 291 K/UL — SIGNIFICANT CHANGE UP (ref 150–400)
POTASSIUM SERPL-MCNC: 3.7 MMOL/L — SIGNIFICANT CHANGE UP (ref 3.5–5.3)
POTASSIUM SERPL-SCNC: 3.7 MMOL/L — SIGNIFICANT CHANGE UP (ref 3.5–5.3)
PROT SERPL-MCNC: 6.7 G/DL — SIGNIFICANT CHANGE UP (ref 6–8.3)
PROT UR-MCNC: 30 MG/DL
PROTHROM AB SERPL-ACNC: 13.3 SEC — SIGNIFICANT CHANGE UP (ref 9.9–13.4)
RBC # BLD: 3.32 M/UL — LOW (ref 4.2–5.8)
RBC # FLD: 14.1 % — SIGNIFICANT CHANGE UP (ref 10.3–14.5)
RBC CASTS # UR COMP ASSIST: >50 /HPF — HIGH (ref 0–4)
SODIUM SERPL-SCNC: 141 MMOL/L — SIGNIFICANT CHANGE UP (ref 135–145)
SP GR SPEC: 1.02 — SIGNIFICANT CHANGE UP (ref 1–1.03)
UROBILINOGEN FLD QL: 0.2 MG/DL — SIGNIFICANT CHANGE UP (ref 0.2–1)
WBC # BLD: 12.02 K/UL — HIGH (ref 3.8–10.5)
WBC # FLD AUTO: 12.02 K/UL — HIGH (ref 3.8–10.5)
WBC UR QL: >50 /HPF — HIGH (ref 0–5)

## 2025-05-24 PROCEDURE — 87086 URINE CULTURE/COLONY COUNT: CPT

## 2025-05-24 PROCEDURE — 85025 COMPLETE CBC W/AUTO DIFF WBC: CPT

## 2025-05-24 PROCEDURE — 51703 INSERT BLADDER CATH COMPLEX: CPT

## 2025-05-24 PROCEDURE — 99285 EMERGENCY DEPT VISIT HI MDM: CPT

## 2025-05-24 PROCEDURE — 85610 PROTHROMBIN TIME: CPT

## 2025-05-24 PROCEDURE — 96374 THER/PROPH/DIAG INJ IV PUSH: CPT | Mod: XU

## 2025-05-24 PROCEDURE — 36415 COLL VENOUS BLD VENIPUNCTURE: CPT

## 2025-05-24 PROCEDURE — 99284 EMERGENCY DEPT VISIT MOD MDM: CPT | Mod: 25

## 2025-05-24 PROCEDURE — 87077 CULTURE AEROBIC IDENTIFY: CPT

## 2025-05-24 PROCEDURE — 87186 SC STD MICRODIL/AGAR DIL: CPT

## 2025-05-24 PROCEDURE — 85730 THROMBOPLASTIN TIME PARTIAL: CPT

## 2025-05-24 PROCEDURE — 80053 COMPREHEN METABOLIC PANEL: CPT

## 2025-05-24 PROCEDURE — 81001 URINALYSIS AUTO W/SCOPE: CPT

## 2025-05-24 PROCEDURE — 51702 INSERT TEMP BLADDER CATH: CPT

## 2025-05-24 RX ORDER — CEFPODOXIME PROXETIL 200 MG/1
1 TABLET, FILM COATED ORAL
Qty: 20 | Refills: 0
Start: 2025-05-24 | End: 2025-06-02

## 2025-05-24 RX ORDER — CEFTRIAXONE 500 MG/1
1000 INJECTION, POWDER, FOR SOLUTION INTRAMUSCULAR; INTRAVENOUS ONCE
Refills: 0 | Status: COMPLETED | OUTPATIENT
Start: 2025-05-24 | End: 2025-05-24

## 2025-05-24 RX ORDER — TAMSULOSIN HYDROCHLORIDE 0.4 MG/1
1 CAPSULE ORAL
Qty: 30 | Refills: 0
Start: 2025-05-24 | End: 2025-06-22

## 2025-05-24 RX ADMIN — CEFTRIAXONE 100 MILLIGRAM(S): 500 INJECTION, POWDER, FOR SOLUTION INTRAMUSCULAR; INTRAVENOUS at 16:57

## 2025-05-24 NOTE — ED ADULT NURSE NOTE - OBJECTIVE STATEMENT
79 y.o male, A&Ox4, PMH HTN, BPH, pt presents to ED c/o hematuria. pt states he has been having 5 days of hematuria, states pt states every time he urinates he 79 y.o male, A&Ox4, PMH HTN, BPH, pt presents to ED c/o hematuria. pt states he has been having 5 days of hematuria, pt states every time he urinates only bright red blood comes out, pt endorses increased urinary frequency. pt denies lightheadedness, dizziness, sob, N/V/D, fevers, chills. IV access established, pt safety and comfort provided.

## 2025-05-24 NOTE — ED PROVIDER NOTE - CLINICAL SUMMARY MEDICAL DECISION MAKING FREE TEXT BOX
see MD note see MD note      Sunday Garibay MD PGY-3:  79-year-old male with PMH HTN, bipolar, depression, status post ECT, remote kidney stones 20 to 30 years ago, history of prostate scraping in 2000, no known drug allergies presents to the emergency department for gross hematuria since Sunday night every time he urinates.  Patient feels like he is emptying and denies any pain with urination.  Denies new back pain, fever, chills, nausea, vomiting, abdominal pain.  Denies recent history of urologic procedures and has not seen urologist.    Vital Signs Stable  Gen: well appearing, NAD  HEENT: no conjunctivitis  Cardiac: regular rate rhythm, normal S1S2  Chest: CTA BL, no wheeze or crackles  Abdomen: normal BS, soft, NT  Extremity: no gross deformity, good perfusion  Skin: no rash  Neuro: grossly normal    Will eval for UTI, retention, basic labs and likely DC with uro outpt follow up

## 2025-05-24 NOTE — ED PROVIDER NOTE - CARE PLAN
1 Principal Discharge DX:	Gross hematuria   Principal Discharge DX:	Acute hemorrhagic cystitis  Secondary Diagnosis:	Acute urinary retention

## 2025-05-24 NOTE — CONSULT NOTE ADULT - SUBJECTIVE AND OBJECTIVE BOX
HPI  79-year-old male with PMH HTN, bipolar, depression, status post ECT, remote kidney stones 20 to 30 years ago, history of prostate scraping in  with Dr. Fajardo, no known drug allergies presents to the emergency department for gross hematuria since  night every time he urinates. Patient has endorsed on and off hematuria for a while, said he saw a urologist but does not know what the outcome was. Endorses urinary frequency at baseline. In the ED found to be retaining 900cc, WBC 12.02 / Hct 33.9  / SCr 1.12, UA reactive. ED attempted placing catheter with no success. 20F coude 3-way placed without issues with return of maurice urine, irrigated to clear with 200cc of NS.     PAST MEDICAL & SURGICAL HISTORY:  Depression      Insomnia      Environmental Allergies      BPH (Benign Prostatic Hypertrophy)      HTN (hypertension)      S/P TURP (Transurethral Resection of Prostate)          MEDICATIONS  (STANDING):    MEDICATIONS  (PRN):      FAMILY HISTORY:      Allergies    POLLEN AND GRASS (Unknown)  No Known Drug Allergies    Intolerances        SOCIAL HISTORY:    REVIEW OF SYSTEMS: Otherwise negative as stated in HPI    Physical Exam  Vital signs  T(C): 36.9 (25 @ 17:16), Max: 36.9 (25 @ 17:16)  HR: 94 (25 @ 17:16)  BP: 133/65 (25 @ 17:16)  SpO2: 97% (25 @ 17:16)  Wt(kg): --    Output      Gen: NAD  Pulm: No respiratory distress	  CV: RRR  GI: S/ND/NT  : draining clear maurice urine  MSK: moves all 4 limbs spontaneously    LABS:       @ 15:32    WBC 12.02 / Hct 33.9  / SCr 1.12         141  |  105  |  31[H]  ----------------------------<  133[H]  3.7   |  22  |  1.12    Ca    10.0      24 May 2025 15:32    TPro  6.7  /  Alb  4.4  /  TBili  1.0  /  DBili  x   /  AST  24  /  ALT  16  /  AlkPhos  76      PT/INR - ( 24 May 2025 15:32 )   PT: 13.3 sec;   INR: 1.17 ratio         PTT - ( 24 May 2025 15:32 )  PTT:32.5 sec  Urinalysis Basic - ( 24 May 2025 16:09 )    Color: Red / Appearance: Turbid / S.024 / pH: x  Gluc: x / Ketone: x  / Bili: Moderate / Urobili: 0.2 mg/dL   Blood: x / Protein: 30 mg/dL / Nitrite: Positive   Leuk Esterase: Large / RBC: x / WBC x   Sq Epi: x / Non Sq Epi: x / Bacteria: x

## 2025-05-24 NOTE — ED ADULT NURSE NOTE - NS ED NOTE ABUSE RESPONSE YN
OUTPATIENT LABORATORY TEST ORDER     Patient Name: Braxton Fair   YOB: 1987     [facility] MR# [if applicable]: [id number]   Date & Time: April 10, 2023  11:56 AM  Expiration Date: 1 year after date issued      Diagnoses: Kidney Transplant (ICD-10 Z94.0)   Long term use of medications (ICD-10 Z79.899)      We ask your assistance in obtaining the following laboratory tests, which are part of our routine surveillance program for Solid Organ Transplant patients.     Please fax each result to [phone], same day as resulted/available    Critical lab results page [phone]  Monday - Friday 8 am to 5 pm  Evening/Weekend/Holiday communicate Critical labs results [phone]    Weekly labs x8 weeks, 4/10/2023 - 6/10/2023, then  Monthly  CBC with platelets  Basic Metabolic Panel (Sodium, Potassium, Chloride, Creatinine, CO2, Urea Nitrogen, glucose, Calcium)  Tacrolimus drug level - 12-hour trough, please document time of last dose      Monthly  BK (Polyoma Virus) PCR Quantitative/Plasma    At month 13 (4/2023)   PRA/DSA (patient to provide )    At month 18 (9/2023)   PRA / DSA (patient to provide )    Every 6 Months   Urine for protein/creatinine    At 2 years post-transplant (Due: 3/2024)  PRA/DSA level (mailers provided by the patient)          If you have any questions, please call [facility]- [phone] or [phone], Fax- [phone].      Duane Tidwell MD   Yes

## 2025-05-24 NOTE — ED PROVIDER NOTE - ATTENDING CONTRIBUTION TO CARE
------------ATTENDING NOTE------------ ------------ATTENDING NOTE------------  pt c/o 72 hrs of gross hematuria, painless, feels empties bladder, no fevers, no additional abnormal bleeding / bruising / rash, awaiting labs / close reassessments -->  - Manjinder King MD   ------------------------------------------------------ ------------ATTENDING NOTE------------  pt c/o 72 hrs of gross hematuria, painless, feels empties bladder, no fevers, no additional abnormal bleeding / bruising / rash, awaiting labs / close reassessments --> UA w/ infectious changes,  ml, 2-way CBI and Urology recs -->  - Manjinder King MD   ------------------------------------------------------

## 2025-05-24 NOTE — ED ADULT NURSE NOTE - NSFALLUNIVINTERV_ED_ALL_ED
Bed/Stretcher in lowest position, wheels locked, appropriate side rails in place/Call bell, personal items and telephone in reach/Instruct patient to call for assistance before getting out of bed/chair/stretcher/Non-slip footwear applied when patient is off stretcher/[place] to call system/Physically safe environment - no spills, clutter or unnecessary equipment/Purposeful proactive rounding/Room/bathroom lighting operational, light cord in reach

## 2025-05-24 NOTE — CONSULT NOTE ADULT - ASSESSMENT
79-year-old male with PMH HTN, bipolar, depression, status post ECT, remote kidney stones 20 to 30 years ago, history of prostate scraping in 2000 with Dr. Fajardo, no known drug allergies presents to the emergency department for gross hematuria since Sunday night every time he urinates. Patient has endorsed on and off hematuria for a while, said he saw a urologist but does not know what the outcome was. Endorses urinary frequency at baseline. In the ED found to be retaining 900cc, WBC 12.02 / Hct 33.9  / SCr 1.12, UA reactive. ED attempted placing catheter with no success. 20F coude 3-way placed without issues with return of maurice urine, irrigated to clear with 200cc of NS.     Plan:  - Tello catheter placed without issues  - Can discharge patient on flomax/finasteride  - DC on antibiotics, follow up urine culture  - Follow up with [facility] as an outpatient for management of hematuria/BPH    Discussed with Dr. [facility] [place] [facility]  [address] [place], [place] [address] [phone]

## 2025-05-24 NOTE — ED PROVIDER NOTE - NSFOLLOWUPINSTRUCTIONS_ED_ALL_ED_FT
You were evaluated in the emergency department for blood in the urine. You were found to be in urinary retention and a leger catheter was placed. You were prescribed antibiotics. Please  and take as directed. See the urologist in the office as soon as possible, within 1 week. Clinic information is attached. Call to make an appointment.     Urinary Retention in Men    WHAT YOU NEED TO KNOW:  Urinary retention is a condition that develops when your bladder does not empty completely when you urinate.      DISCHARGE INSTRUCTIONS:    *** DO NOT REMOVE THE LEGER YOURSELF as this can damage the urethral track. There is a balloon on the leger that keeps it it place. Please follow up with Urology for further treatment. ***    Medicines:   •Medicines can help decrease the size of your prostate, fight infection, and help you urinate more easily.  •Take your medicine as directed. Contact your healthcare provider if you think your medicine is not helping or if you have side effects. Tell him or her if you are allergic to any medicine. Keep a list of the medicines, vitamins, and herbs you take. Include the amounts, and when and why you take them. Bring the list or the pill bottles to follow-up visits. Carry your medicine list with you in case of an emergency.    Leger catheter care: You may need a Leger catheter for up to 2 weeks at home. Healthcare providers will give you a smaller leg bag to collect urine. Keep the bag below your waist. This will prevent urine from flowing back into your bladder and causing an infection or other problems. Also, keep the tube free of kinks so the urine will drain properly. Do not pull on the catheter. This can cause pain and bleeding, and may cause the catheter to come out. Ask your healthcare provider or urologist for more information on Leger catheter care.    Urinate regularly: When your catheter is removed, do not let your bladder become too full before you urinate. Set regular times each day to urinate. Urinate as soon as you feel the need or at least every 3 hours while you are awake. Do not drink liquids before you go to bed. Urinate right before you go to bed.    Follow up with your healthcare provider or urologist as directed: Write down your questions so you remember to ask them during your visits.     Contact your healthcare provider or urologist if:   •You have a fever.  •You have pain when you urinate.  •You have blood in your urine.  •You have problems with your catheter.  •You have questions or concerns about your condition or care.    Return to the emergency department if:   •You have severe abdominal pain.  •You are breathing faster than usual.  •Your heartbeat is faster than usual.  •Your face, hands, feet, or ankles are swollen.    Rest, drink plenty of fluids.  Advance activity as tolerated.  Continue all previously prescribed medications as directed.  Follow up with your primary care physician in 48-72 hours- bring copies of your results.  Return to the ER for worsening or persistent symptoms, and/or ANY NEW OR CONCERNING SYMPTOMS. If you have issues obtaining follow up, please call: [phone] to obtain a doctor or specialist who takes your insurance in your area. See your Primary Doctor and Urology in next week for follow up -- call to discuss.    Stay well hydrated, eat regular healthy meals, continue current medications / leger catheter care.    Take CEFPODOXIME as directed for urinary infection -- see medication warnings.    See HEMORRHAGIC CYSTITIS and LEGER CATHETER CARE information and return instructions.    Seek immediate medical care for new/worsening symptoms/concerns. See your Primary Doctor and Urology in next week for follow up -- call to discuss.    Stay well hydrated, eat regular healthy meals, continue current medications / lgeer catheter care.    Follow up with a urologist. Our rapid referral department should call you this week to help set up an appointment if you do not already have a Urologist.     Take CEFPODOXIME as directed for urinary infection -- see medication warnings.    See HEMORRHAGIC CYSTITIS and LEGER CATHETER CARE information and return instructions.    Seek immediate medical care for new/worsening symptoms/concerns.

## 2025-05-24 NOTE — ED PROVIDER NOTE - PROGRESS NOTE DETAILS
Sunday Garibay MD PGY-3: . 3 way leger attempted palcement unsuccessful. Urology consulted for possible CBI Sunday Garibay MD PGY-3: urology placed leger drained 1 L already clearing hematuria. States can go home from uro perspective, recommends antibiotics. Outpt uro follow up. Cr normal will DC on cefpodoxime Kb Altamirano DO (PGY-2) Patient passed PO challenge. VS remain non actionable. Given leg bag for leger and education, will follow up at [facility] for Urology. Return precautions and care instructions discussed with patient at bedside. Patient endorsed understanding via teachback method.

## 2025-05-24 NOTE — ED PROVIDER NOTE - PATIENT PORTAL LINK FT
You can access the FollowMyHealth Patient Portal offered by [facility] by registering at the following website: [email or web address]. By joining [employer]’s FollowMyHealth portal, you will also be able to view your health information using other applications (apps) compatible with our system.

## 2025-05-24 NOTE — ED PROVIDER NOTE - NSFOLLOWUPCLINICS_GEN_ALL_ED_FT
[facility] [place]  [facility]rology  [address] [place] [address], [place] [address]  Phone: [phone]  Fax:

## 2025-05-26 ENCOUNTER — EMERGENCY (EMERGENCY)
Facility: HOSPITAL | Age: 80
LOS: 1 days | End: 2025-05-26
Attending: STUDENT IN AN ORGANIZED HEALTH CARE EDUCATION/TRAINING PROGRAM
Payer: COMMERCIAL

## 2025-05-26 VITALS
DIASTOLIC BLOOD PRESSURE: 76 MMHG | TEMPERATURE: 99 F | RESPIRATION RATE: 17 BRPM | OXYGEN SATURATION: 99 % | HEART RATE: 78 BPM | SYSTOLIC BLOOD PRESSURE: 138 MMHG

## 2025-05-26 VITALS
RESPIRATION RATE: 18 BRPM | WEIGHT: 195.11 LBS | DIASTOLIC BLOOD PRESSURE: 84 MMHG | SYSTOLIC BLOOD PRESSURE: 151 MMHG | OXYGEN SATURATION: 93 % | HEIGHT: 70 IN | HEART RATE: 125 BPM | TEMPERATURE: 97 F

## 2025-05-26 LAB
ALBUMIN SERPL ELPH-MCNC: 4 G/DL — SIGNIFICANT CHANGE UP (ref 3.3–5)
ALP SERPL-CCNC: 77 U/L — SIGNIFICANT CHANGE UP (ref 40–120)
ALT FLD-CCNC: 13 U/L — SIGNIFICANT CHANGE UP (ref 10–45)
ANION GAP SERPL CALC-SCNC: 14 MMOL/L — SIGNIFICANT CHANGE UP (ref 5–17)
APTT BLD: 33.4 SEC — SIGNIFICANT CHANGE UP (ref 26.1–36.8)
AST SERPL-CCNC: 21 U/L — SIGNIFICANT CHANGE UP (ref 10–40)
BASOPHILS # BLD AUTO: 0.03 K/UL — SIGNIFICANT CHANGE UP (ref 0–0.2)
BASOPHILS NFR BLD AUTO: 0.3 % — SIGNIFICANT CHANGE UP (ref 0–2)
BILIRUB SERPL-MCNC: 1 MG/DL — SIGNIFICANT CHANGE UP (ref 0.2–1.2)
BUN SERPL-MCNC: 27 MG/DL — HIGH (ref 7–23)
CALCIUM SERPL-MCNC: 9.5 MG/DL — SIGNIFICANT CHANGE UP (ref 8.4–10.5)
CHLORIDE SERPL-SCNC: 107 MMOL/L — SIGNIFICANT CHANGE UP (ref 96–108)
CO2 SERPL-SCNC: 21 MMOL/L — LOW (ref 22–31)
CREAT SERPL-MCNC: 1.1 MG/DL — SIGNIFICANT CHANGE UP (ref 0.5–1.3)
EGFR: 69 ML/MIN/1.73M2 — SIGNIFICANT CHANGE UP
EGFR: 69 ML/MIN/1.73M2 — SIGNIFICANT CHANGE UP
EOSINOPHIL # BLD AUTO: 0.09 K/UL — SIGNIFICANT CHANGE UP (ref 0–0.5)
EOSINOPHIL NFR BLD AUTO: 0.9 % — SIGNIFICANT CHANGE UP (ref 0–6)
GAS PNL BLDV: SIGNIFICANT CHANGE UP
GLUCOSE SERPL-MCNC: 121 MG/DL — HIGH (ref 70–99)
HCT VFR BLD CALC: 32.6 % — LOW (ref 39–50)
HGB BLD-MCNC: 10.8 G/DL — LOW (ref 13–17)
IMM GRANULOCYTES NFR BLD AUTO: 0.5 % — SIGNIFICANT CHANGE UP (ref 0–0.9)
INR BLD: 1.16 RATIO — SIGNIFICANT CHANGE UP (ref 0.85–1.16)
LYMPHOCYTES # BLD AUTO: 1.02 K/UL — SIGNIFICANT CHANGE UP (ref 1–3.3)
LYMPHOCYTES # BLD AUTO: 9.7 % — LOW (ref 13–44)
MCHC RBC-ENTMCNC: 33.1 G/DL — SIGNIFICANT CHANGE UP (ref 32–36)
MCHC RBC-ENTMCNC: 33.3 PG — SIGNIFICANT CHANGE UP (ref 27–34)
MCV RBC AUTO: 100.6 FL — HIGH (ref 80–100)
MONOCYTES # BLD AUTO: 1 K/UL — HIGH (ref 0–0.9)
MONOCYTES NFR BLD AUTO: 9.5 % — SIGNIFICANT CHANGE UP (ref 2–14)
NEUTROPHILS # BLD AUTO: 8.3 K/UL — HIGH (ref 1.8–7.4)
NEUTROPHILS NFR BLD AUTO: 79.1 % — HIGH (ref 43–77)
NRBC BLD AUTO-RTO: 0 /100 WBCS — SIGNIFICANT CHANGE UP (ref 0–0)
PLATELET # BLD AUTO: 233 K/UL — SIGNIFICANT CHANGE UP (ref 150–400)
POTASSIUM SERPL-MCNC: 3.8 MMOL/L — SIGNIFICANT CHANGE UP (ref 3.5–5.3)
POTASSIUM SERPL-SCNC: 3.8 MMOL/L — SIGNIFICANT CHANGE UP (ref 3.5–5.3)
PROT SERPL-MCNC: 6.4 G/DL — SIGNIFICANT CHANGE UP (ref 6–8.3)
PROTHROM AB SERPL-ACNC: 13.2 SEC — SIGNIFICANT CHANGE UP (ref 9.9–13.4)
RBC # BLD: 3.24 M/UL — LOW (ref 4.2–5.8)
RBC # FLD: 13.9 % — SIGNIFICANT CHANGE UP (ref 10.3–14.5)
SODIUM SERPL-SCNC: 142 MMOL/L — SIGNIFICANT CHANGE UP (ref 135–145)
WBC # BLD: 10.49 K/UL — SIGNIFICANT CHANGE UP (ref 3.8–10.5)
WBC # FLD AUTO: 10.49 K/UL — SIGNIFICANT CHANGE UP (ref 3.8–10.5)

## 2025-05-26 PROCEDURE — 87086 URINE CULTURE/COLONY COUNT: CPT

## 2025-05-26 PROCEDURE — 93010 ELECTROCARDIOGRAM REPORT: CPT

## 2025-05-26 PROCEDURE — 85025 COMPLETE CBC W/AUTO DIFF WBC: CPT

## 2025-05-26 PROCEDURE — 82330 ASSAY OF CALCIUM: CPT

## 2025-05-26 PROCEDURE — 99285 EMERGENCY DEPT VISIT HI MDM: CPT

## 2025-05-26 PROCEDURE — 80053 COMPREHEN METABOLIC PANEL: CPT

## 2025-05-26 PROCEDURE — 85610 PROTHROMBIN TIME: CPT

## 2025-05-26 PROCEDURE — 85018 HEMOGLOBIN: CPT

## 2025-05-26 PROCEDURE — 82435 ASSAY OF BLOOD CHLORIDE: CPT

## 2025-05-26 PROCEDURE — 84132 ASSAY OF SERUM POTASSIUM: CPT

## 2025-05-26 PROCEDURE — 84295 ASSAY OF SERUM SODIUM: CPT

## 2025-05-26 PROCEDURE — 82803 BLOOD GASES ANY COMBINATION: CPT

## 2025-05-26 PROCEDURE — 93005 ELECTROCARDIOGRAM TRACING: CPT

## 2025-05-26 PROCEDURE — 85730 THROMBOPLASTIN TIME PARTIAL: CPT

## 2025-05-26 PROCEDURE — 82947 ASSAY GLUCOSE BLOOD QUANT: CPT

## 2025-05-26 PROCEDURE — 83605 ASSAY OF LACTIC ACID: CPT

## 2025-05-26 PROCEDURE — 85014 HEMATOCRIT: CPT

## 2025-05-26 PROCEDURE — 99284 EMERGENCY DEPT VISIT MOD MDM: CPT | Mod: 25

## 2025-05-26 PROCEDURE — 82550 ASSAY OF CK (CPK): CPT

## 2025-05-26 RX ORDER — ACETAMINOPHEN 500 MG/5ML
1000 LIQUID (ML) ORAL ONCE
Refills: 0 | Status: COMPLETED | OUTPATIENT
Start: 2025-05-26 | End: 2025-05-26

## 2025-05-26 RX ADMIN — Medication 1000 MILLILITER(S): at 10:57

## 2025-05-26 NOTE — ED PROVIDER NOTE - PROGRESS NOTE DETAILS
Pt w/ yellow urine in leger bag. Changed to leg bag and pt educated on how to empty. To f/u with his Urologist. UA QNS. Pt already on abx, advised to continue. Non emergent ordered. Patient stable for discharge.  Follow up instructions given, return to ED precautions reviewed. Importance of follow up emphasized, patient verbalized understanding.  All questions answered. Betsy Montanez PA-C

## 2025-05-26 NOTE — ED ADULT NURSE REASSESSMENT NOTE - NS ED NURSE REASSESS COMMENT FT1
Pt leger bag changed to leg bag. Pt educated and demonstrates and verbalizes understanding of emptying leg bag. Pt given disposable clothes as well. Cab called and education provided.

## 2025-05-26 NOTE — ED PROVIDER NOTE - ATTENDING APP SHARED VISIT CONTRIBUTION OF CARE
79 M with a PMH HTN, bipolar, depression, status post ECT, remote kidney stones 20 to 30 years ago, history of prostate scraping in 2000 with Dr. Fajardo, p/w penile pain and dark urine, pt was seen in ED on 05/24/25 for hematuria and had a three-way placed by urology, urine color improved sp NS irrigation and patient was discharged on cefpodoxime pt compliant w/ antibiotic states he hasn't drained his leger because he was unaware of how to do it. pt back in the ed due to pain in the penis because when he was shaving he felt the bag pull on him, he has no cp, no sob, no nausea no vomiting, pt w/ normal po intake. on exam, pt w/ dark ice-tea/cola colored urine, he has no abd tenderness, no cva tenderness, plan for labs imaging and reassessment findings c/f posisble rashid 2/2 back up of urine ?hydro vs electrolyte abnormality.

## 2025-05-26 NOTE — ED ADULT NURSE NOTE - NSFALLRISKINTERV_ED_ALL_ED
Assistance OOB with selected safe patient handling equipment if applicable/Assistance with ambulation/Communicate fall risk and risk factors to all staff, patient, and family/Monitor gait and stability/Provide visual cue: yellow wristband, yellow gown, etc/Reinforce activity limits and safety measures with patient and family/Call bell, personal items and telephone in reach/Instruct patient to call for assistance before getting out of bed/chair/stretcher/Non-slip footwear applied when patient is off stretcher/[place] to call system/Physically safe environment - no spills, clutter or unnecessary equipment/Purposeful Proactive Rounding/Room/bathroom lighting operational, light cord in reach

## 2025-05-26 NOTE — ED ADULT NURSE NOTE - OBJECTIVE STATEMENT
78 yo M with a PMH HTN, bipolar, depression, status post ECT, remote kidney stones 20 to 30 years ago, history of prostate scraping in 2000 with Dr. Fajardo, presents with penile pain and hematuria since Saturday.  Patient was seen in the ED 05/24/25 for hematuria and had a three-way placed by urology, urine color improved sp NS irrigation and patient was discharged on cefpodoxime.  Reports he has been taking his antibiotics twice a day as prescribed.  Was unaware of how to drain his Tello bag so it has not been emptied since it was placed 2 days ago. Concerned that he is still experiencing hematuria.  Patient tried calling his urologist today but as it was a holiday office was closed and presenting back to the ED.  Otherwise denies fev

## 2025-05-26 NOTE — ED PROVIDER NOTE - OBJECTIVE STATEMENT
78 yo M with a PMH HTN, bipolar, depression, status post ECT, remote kidney stones 20 to 30 years ago, history of prostate scraping in 2000 with Dr. Fajardo, presents with penile pain and hematuria since Saturday.  Patient was seen in the ED 05/24/25 for hematuria and had a three-way placed by urology, urine color improved sp NS irrigation and patient was discharged on cefpodoxime.  Reports he has been taking his antibiotics twice a day as prescribed.  Was unaware of how to drain his Tello bag so it has not been emptied since it was placed 2 days ago. Concerned that he is still experiencing hematuria.  Patient tried calling his urologist today but as it was a holiday office was closed and presenting back to the ED.  Otherwise denies fever chills abdominal pain dizziness weakness. Has been eating/drinking normally.

## 2025-05-26 NOTE — ED PROVIDER NOTE - PHYSICAL EXAMINATION
CONSTITUTIONAL: In no apparent distress.  ENT: Airway patent, moist mucous membranes.   EYES: Pupils equal, round and reactive to light. EOMI. Conjunctiva normal appearing.   CARDIAC: Tachycardic, regular rhythm.  Heart sounds S1, S2.    RESPIRATORY: Breath sounds clear and equal bilaterally.   GASTROINTESTINAL: Abdomen soft, non-tender, not distended.  : Penis normall appearing w/o swelling or discharge. Tello noted from meatus without any obvious leaking around catheter. Tello bag distended and full of maurice colored urine. Tello bag emptied and now urine color improved. Chaperoned by PCA Jennifer.   MUSCULOSKELETAL: Spine appears normal.  NEUROLOGICAL: Alert and oriented x3, no focal deficits, no motor or sensory deficits. 5/5 muscle strength throughout.  SKIN: Skin normal color, warm, dry and intact.   PSYCHIATRIC: Anxious and tearful. No SI/HI.

## 2025-05-26 NOTE — ED PROVIDER NOTE - NSFOLLOWUPINSTRUCTIONS_ED_ALL_ED_FT
Please make sure to follow up with your primary care doctor within 1-2 days and with your UROLOGY specialist.     Bring a copy of all of your results with you to your follow up appointments.   Return to the ER as discussed if you develop any new or worsening symptoms.     You may take Tylenol 1000mg every 6 hours as needed for pain.   Continue your antibiotics as prescribed.     Empty your CARABALLO BAG EVERY TWO HOURS.

## 2025-05-26 NOTE — ED PROVIDER NOTE - PRO INTERPRETER NEED 2
Impression: Primary open-angle glaucoma, bilateral, mild stage: H40.1131. Plan: Discussed and reviewed diagnosis with patient today, understood by patient, intraocular pressure stable with medication. Continue medications and observe. Importance of compliance with medications and regular follow-up reiterated, will continue to monitor. Patient to continue Alphagan p bid ou & Lumigan qhs ou (pt gets gtts in [facility]). Patient to continue w/ AFTS bid to tid ou everyday. English

## 2025-05-26 NOTE — ED PROVIDER NOTE - PATIENT PORTAL LINK FT
You can access the FollowMyHealth Patient Portal offered by [facility] by registering at the following website: [email or web address]. By joining [employer]’s FollowMyHealth portal, you will also be able to view your health information using other applications (apps) compatible with our system.

## 2025-05-27 LAB
CULTURE RESULTS: NO GROWTH — SIGNIFICANT CHANGE UP
SPECIMEN SOURCE: SIGNIFICANT CHANGE UP

## 2025-05-27 NOTE — CHART NOTE - NSCHARTNOTEFT_GEN_A_CORE
Social work was notified of patient discharged yesterday contacting ED rep and SW office requesting assistance with home services. Chart reviewed. Per chart, patient is a 78 y/o male who presented to [facility] ED over the weekend complaining of penile pain and hematuria since Saturday. LMSW contacted patient via phone call to [phone] and introduced self and role of SW. Patient agitated and screaming reporting that he was lied to and needs assistance with removing the leger and emptying the bag. LMSW informed patient that it was documented that he received teaching prior to being discharged and did not report any issues. Patient reported that he is unable to follow instructions provided on how to care for leger because "I'm an idiot and I can't do it" Patient requesting for an Attending to follow up with him in the home and empty the bag. LMSW attempted to provide education, however, patient continued to scream that his needs were not being heard. LMSW reached out to Senior SW for assistance. Case reviewed with Senior SW who suggested sending [facility] referral for teaching. LMSW contacted patient back with Senior LOGAN and explored history of leger. Patient reported that the last time he saw a Urologist was 6 years ago, however, chart notes 2000. Patient states that the leger was placed at [facility] yesterday and he is unable to follow up with a Urologist in the community due to being in too much pain. Patient without PCP listed for home care referral. Patient not amenable to education and hung up the phone.

## 2025-05-28 LAB
-  CEFTRIAXONE: SIGNIFICANT CHANGE UP
-  PENICILLIN: SIGNIFICANT CHANGE UP
-  VANCOMYCIN: SIGNIFICANT CHANGE UP
CULTURE RESULTS: ABNORMAL
METHOD TYPE: SIGNIFICANT CHANGE UP
ORGANISM # SPEC MICROSCOPIC CNT: ABNORMAL
ORGANISM # SPEC MICROSCOPIC CNT: ABNORMAL
SPECIMEN SOURCE: SIGNIFICANT CHANGE UP